# Patient Record
Sex: FEMALE | Race: WHITE | NOT HISPANIC OR LATINO | Employment: UNEMPLOYED | URBAN - NONMETROPOLITAN AREA
[De-identification: names, ages, dates, MRNs, and addresses within clinical notes are randomized per-mention and may not be internally consistent; named-entity substitution may affect disease eponyms.]

---

## 2017-03-30 ENCOUNTER — TRANSCRIBE ORDERS (OUTPATIENT)
Dept: PHYSICAL THERAPY | Facility: HOSPITAL | Age: 72
End: 2017-03-30

## 2017-03-30 DIAGNOSIS — M54.16 BILATERAL LUMBAR RADICULOPATHY: Primary | ICD-10-CM

## 2017-04-13 ENCOUNTER — HOSPITAL ENCOUNTER (OUTPATIENT)
Dept: PHYSICAL THERAPY | Facility: HOSPITAL | Age: 72
Setting detail: THERAPIES SERIES
Discharge: HOME | End: 2017-04-13

## 2017-04-13 DIAGNOSIS — M54.41 CHRONIC MIDLINE LOW BACK PAIN WITH BILATERAL SCIATICA: Primary | ICD-10-CM

## 2017-04-13 DIAGNOSIS — M54.16 LUMBAR RADICULAR PAIN: ICD-10-CM

## 2017-04-13 DIAGNOSIS — M54.42 CHRONIC MIDLINE LOW BACK PAIN WITH BILATERAL SCIATICA: Primary | ICD-10-CM

## 2017-04-13 DIAGNOSIS — G89.29 CHRONIC MIDLINE LOW BACK PAIN WITH BILATERAL SCIATICA: Primary | ICD-10-CM

## 2017-04-13 DIAGNOSIS — M19.90 ARTHRITIS: ICD-10-CM

## 2017-04-13 PROCEDURE — G8982 BODY POS GOAL STATUS: HCPCS | Performed by: PHYSICAL THERAPIST

## 2017-04-13 PROCEDURE — G8981 BODY POS CURRENT STATUS: HCPCS | Performed by: PHYSICAL THERAPIST

## 2017-04-13 PROCEDURE — 97110 THERAPEUTIC EXERCISES: CPT | Performed by: PHYSICAL THERAPIST

## 2017-04-13 PROCEDURE — 97162 PT EVAL MOD COMPLEX 30 MIN: CPT | Performed by: PHYSICAL THERAPIST

## 2017-04-13 NOTE — PROGRESS NOTES
Outpatient Physical Therapy Ortho Initial Evaluation  [facility]  Edna Wells, PT, DPT, CSCS       Patient Name: Sophia Neff  : 1945  MRN: [id number]  Today's Date: 2017      Visit Date: 2017    Pt reports 0/10 pain.  Reports N/A% of improvement.  Attended  visits.  Insurance available: Medical necessity and medicare cap  Next MD appt: 2017.  Recertification: 2017.     Past Medical History:   Diagnosis Date   • Anemia    • Arthritis    • Diabetes mellitus    • Fibromyalgia    • High cholesterol    • Hypertension    • Plantar fasciitis     Bilateral        Past Surgical History:   Procedure Laterality Date   • APPENDECTOMY  1970   • BREAST SURGERY Bilateral 1992    Reduction   • GALLBLADDER SURGERY  2004   • HYSTERECTOMY     • NECK SURGERY  1989   • PELVIC FLOOR REPAIR  2009   • PELVIC FLOOR REPAIR  2009       Visit Dx:     ICD-10-CM ICD-9-CM   1. Chronic midline low back pain with bilateral sciatica M54.41 724.2    M54.42 724.3    G89.29 338.29   2. Lumbar radicular pain M54.16 724.4   3. Arthritis M19.90 716.90     Number of days off work: N/A    Patient is .    Patient has grown children whom live with her.    Medications:   Glucotrol 10mg  Januvia 50mg  Hydralazine 10mg  Cozaar EQ 25mg  Docusate Sodium 100mg  Pravastatin 40mg  Claratin 10mg  Robasin 500mg  Clacium 600mg with Vit D 125 IU  Multi-vitamin- once a day women's  Tylenol arthritis 650mg (no asprain)  Voltaren topical gel 1%  Triamcinolone Acet 454GM-Top 0.1% oint      Allergies: See EMR and also  Iriseofluvin Ultramicrosize  Magcitrate  BC/Goody Powder            Patient History       17 0900          History    Chief Complaint Pain  -AJ      Type of Pain Back pain  -AJ      Date Current Problem(s) Began --   Chronic, 1 month ago increasing onset  -AJ      Brief Description of Current Complaint Previous patient ~ 6 months ago  "for abdominal pain and radiating pain around the trunk. Reports about a month ago pain in LB and legs started increasing.  Reports she's had on/off x-rays for the last 20 years. Reports the pain is come/go and if she moves in a hurry it increases pain. Flexion increases pain.  -AJ      Previous treatment for THIS PROBLEM Rehabilitation  -AJ      Patient/Caregiver Goals Relieve pain  -AJ      Current Tobacco Use None  -AJ      Smoking Status Former Smoker, quite years ago.  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 Yes   Dr. Moore, neurology  -AJ      What clinical tests have you had for this problem? MRI  -AJ      Results of Clinical Tests DDD and other degenerative changes.  -AJ      History of Previous Related Injuries None  -AJ      Are you or can you be pregnant No  -AJ      Pain     Pain Location Back  -AJ      Pain at Present 0   at rest, \"As long as I don't move it's fine.\"  -AJ      Pain at Best 0  -AJ      Pain at Worst 10   reports while laughing and giddy  -AJ      Pain Frequency Intermittent  -AJ      Pain Description Sharp;Dull;Aching;Sore;Cramping  -AJ      What Performance Factors Make the Current Problem(s) WORSE? Flexion and fast movements  -AJ      What Performance Factors Make the Current Problem(s) BETTER? Resting and being still  -AJ      Tolerance Time- Standing Hours/ Unlimited  -AJ      Tolerance Time- Sitting 30 minutes  -AJ      Tolerance Time- Walking Unlimited  -AJ      Tolerance Time- Lying Unlimited  -AJ      Is your sleep disturbed? No  -AJ      Is medication used to assist with sleep? Yes  -AJ      Difficulties at work? N/A  -AJ      Difficulties with ADL's? dressing, showering  -AJ      Difficulties with recreational activities? Computing, website, [employer]... \"anything " "sitting\"  -AJ      Fall Risk Assessment    Any falls in the past year: Yes  -AJ      Number of falls reported in the last 12 months 1  -AJ      Factors that contributed to the fall: Tripped   Going up the steps  -AJ      Does patient have a fear of falling No  -AJ      Services    Prior Rehab/Home Health Experiences Yes  -AJ      When was the prior experience with Rehab/Home Health November 2016, 1 month of Rehab  -AJ      Where was the prior experience with Rehab/Home Health [facility]  -AJ      Are you currently receiving Home Health services No  -AJ      What agency are you receiving Home Health services N/A  -AJ      Do you plan to receive Home Health services in the near future No  -AJ        User Key  (r) = Recorded By, (t) = Taken By, (c) = Cosigned By    Initials Name Provider Type    RIAZ Wells, PT Physical Therapist                PT Ortho       04/13/17 0900    Subjective Comments    Subjective Comments To have no pain and be able to move more and feel better.  -AJ    Subjective Pain    Able to rate subjective pain? yes  -AJ    Pre-Treatment Pain Level 0  -AJ    Post-Treatment Pain Level 0   \"Sore\"  -AJ    Posture/Observations    Alignment Options Forward head;Cervical lordosis;Thoracic kyphosis;Rounded shoulders;Lumbar lordosis  -AJ    Forward Head Mild;Increased  -AJ    Cervical Lordosis Mild;Increased  -AJ    Thoracic Kyphosis Mild;Increased  -AJ    Rounded Shoulders Bilateral:;Mild;Increased  -AJ    Lumbar lordosis Normal  -AJ    Posture/Observations Comments No acute distress.  -AJ    Sensation    Sensation WNL? WNL  -AJ    Light Touch No apparent deficits  -AJ    Sharp/Dull No apparent deficits  -AJ    Additional Comments patient reports hshooting pain in buttocks and down legs on/off, but denies any specific numbness or tingling.  -AJ    DTR- Lower Quarter Clearing    Patellar tendon (L2-4) Bilateral:;2- Normal response  -AJ    Achilles " tendon (S1-2) Bilateral:;2- Normal response  -AJ    Trunk    Flexion AROM --   75°  -AJ    Extension AROM --   28°  -AJ    Left Lateral Flexion AROM --   75% of range  -AJ    Right Lateral Flexion AROM --   75% of range  -AJ    Left Rotation AROM --   75% of range  -AJ    Right Rotation AROM --   75% of range  -AJ    MMT (Manual Muscle Testing)    General MMT Assessment Detail B LE 5/5  -AJ    Trunk    Trunk Flexion Gross Movement (4-/5) good minus  -AJ    Trunk Extension Gross Movement (4-/5) good minus  -AJ    Left Shoulder    Flexion Gross Movement (4+/5) good plus  -AJ    Right Shoulder    Flexion Gross Movement (4+/5) good plus  -AJ    Upper Extremity    Upper Ext Manual Muscle Testing Detail B UE 5/5 except those listed specifically.  -AJ    Lower Extremity Flexibility    Hip Flexors Bilateral:;Moderately limited  -AJ    Transfers    Transfers, Bed-Chair Tullos moderate assist (50% patient effort)   Supine to sit   -AJ    Transfers, Chair-Bed Tullos minimum assist (75% patient effort)   sit to supine  -AJ    Transfers, Sit-Stand Tullos independent  -AJ    Transfers, Stand-Sit Tullos independent  -AJ    Transfer, Safety Issues --   Decreased core strength.  -AJ    Transfer, Impairments pain;strength decreased  -AJ    Transfer, Comment A required sith supine to/from sit only, not sit to/from stand.  -    Gait Assessment/Treatment    Gait, Tullos Level conditional independence  -    Gait, Assistive Device straight cane   no asistive devie within the back treatment area.  -AJ    Gait, Impairments --   None noted  -      User Key  (r) = Recorded By, (t) = Taken By, (c) = Cosigned By    Initials Name Provider Type    RIAZ Wells PT Physical Therapist                Therapy Education       04/13/17 0900          Therapy Education    Given HEP;Symptoms/condition management;Pain management;Posture/body mechanics;Mobility training   Plan of Care; Log Roll Technique.  -RIAZ       Program New  -      How Provided Verbal;Demonstration;Written  -AJ      Provided to Patient  -RIAZ      Level of Understanding Verbalized;Demonstrated  -        User Key  (r) = Recorded By, (t) = Taken By, (c) = Cosigned By    Initials Name Provider Type    RIAZ Wells, PT Physical Therapist                PT OP Goals       04/13/17 0900       PT Short Term Goals    STG Date to Achieve 05/04/17  -RIAZ     STG 1 I with HEp and have additions/changes by next recertification.  -RIAZ     STG 2 AROM lumbar extension 35° or greater.  -AJ     STG 3 AROM B lumbar SB WNL.  -AJ     STG 4 AROM B lumbar ROT WNL.  -     STG 5 Able to perform a proper log roll technique with no A.  -     Long Term Goals    LTG Date to Achieve 05/12/17  -RIAZ     LTG 1 AROM for lumbar spine all WNL no increase in pain.  -     LTG 2 Able to perform 20 briges UE in an X with no increase in pain.  -AJ     LTG 3 Able to show proper lifting technique floor to waist to shoulder level.  -     LTG 4 Able to show proper ergonomic positioning for sitting at a desk/table.  -     LTG 5 Negative kathia B.  -     LTG 6 I with final HEP.  -     LTG 7 D/C with free 30 day fitness formula membership.  -     Time Calculation    PT Goal Re-Cert Due Date 05/04/17  -       User Key  (r) = Recorded By, (t) = Taken By, (c) = Cosigned By    Initials Name Provider Type    RIAZ Wells PT Physical Therapist         Barriers to Rehab: Significant arthritic/degenerative changes that have occurred within the spine, The patient's obesity, The patient's generally deconditioned state.    Safety Issues: None noted.            PT Assessment/Plan       04/13/17 0900       PT Assessment    Functional Limitations Limitation in home management;Limitations in community activities;Performance in leisure activities;Performance in self-care ADL  -     Impairments Balance;Endurance;Impaired flexibility;Impaired muscle endurance;Impaired muscle  length;Impaired muscle power;Joint mobility;Motor function;Muscle strength;Pain;Posture;Range of motion  -     Assessment Comments Patient moved very slow and guarded but did well with all exercises.  -AJ     Rehab Potential Fair  -AJ     Patient/caregiver participated in establishment of treatment plan and goals Yes  -AJ     Patient would benefit from skilled therapy intervention Yes  -AJ     PT Plan    PT Frequency 2x/week  -AJ     Predicted Duration of Therapy Intervention (days/wks) 3-4 weeks, 6-8 visits  -AJ     Planned CPT's? PT EVAL MOD COMPLELITY: 79437;PT RE-EVAL: 39852;PT THER PROC EA 15 MIN: 66932;PT THER ACT EA 15 MIN: 55297;PT MANUAL THERAPY EA 15 MIN: 69134;PT ELECTRICAL STIM UNATTEND: ;PT THER SUPP EA 15 MIN  -AJ     Physical Therapy Interventions (Optional Details) balance training;bed mobility training;gait training;gross motor skills;home exercise program;joint mobilization;lumbar stabilization;manual therapy techniques;modalities;patient/family education;postural re-education;ROM (Range of Motion);stair training;strengthening;stretching;swiss ball techniques;transfer training  -     PT Plan Comments Add DENISE next session if able.  -AJ       User Key  (r) = Recorded By, (t) = Taken By, (c) = Cosigned By    Initials Name Provider Type    RIAZ eWlls PT Physical Therapist       Other therapeutic activities and/or exercises will be prescribed depending on the patients progress or lack there of.          Modalities       04/13/17 0900          Ice    Patient denies application of Ice Yes  -AJ      Patient reports will apply ice at home to involved area Yes  -AJ        User Key  (r) = Recorded By, (t) = Taken By, (c) = Cosigned By    Initials Name Provider Type    RIAZ Wells PT Physical Therapist              Exercises       04/13/17 0900          Subjective Comments    Subjective Comments To have no pain and be able to move more and feel better.  -AJ      Subjective  "Pain    Able to rate subjective pain? yes  -AJ      Pre-Treatment Pain Level 0  -AJ      Post-Treatment Pain Level 0   \"Sore\"  -AJ      Exercise 1    Exercise Name 1 B sit piri S  -AJ      Reps 1 2  -AJ      Time (Seconds) 1 30 seconds  -AJ      Exercise 2    Exercise Name 2 B St. HS S  -AJ      Reps 2 2  -AJ      Time (Seconds) 2 30 seconds  -AJ      Exercise 3    Exercise Name 3 LTR- semireclined  -AJ      Exercise 4    Exercise Name 4 St. Lumbar extension  -      Reps 4 10  -AJ      Time (Seconds) 4 5\" hold  -AJ        User Key  (r) = Recorded By, (t) = Taken By, (c) = Cosigned By    Initials Name Provider Type     Edna Wells PT Physical Therapist                              Outcome Measures       04/13/17 0900          Modified Oswestry    Modified Oswestry Score/Comments 22%  -      Functional Assessment    Outcome Measure Options Modifed Owestry  -        User Key  (r) = Recorded By, (t) = Taken By, (c) = Cosigned By    Initials Name Provider Type     Edna Wells PT Physical Therapist            Time Calculation:   Start Time: 0900  Stop Time: 0958  Time Calculation (min): 58 min  Total Timed Code Minutes- PT: 18 minute(s)     Therapy Charges for Today     Code Description Service Date Service Provider Modifiers Qty    33018361100 HC PT CHNG MAIN POS CURRENT 4/13/2017 Edna Wells, PT GP, CJ 1    88805681806 HC PT CHNG MAIN POS PROJECTED 4/13/2017 Edna Wells, PT GP, CI 1    12640952491 HC PT EVAL MOD COMPLEXITY 3 4/13/2017 Edna Wells PT GP 1    02503666437 HC PT THER PROC EA 15 MIN 4/13/2017 Edna Wells PT GP 1    43616726205 HC PT THER SUPP EA 15 MIN 4/13/2017 Edna Wells, PT GP 2          PT G-Codes  Outcome Measure Options: Modifed Owestry  Functional Limitation: Changing and maintaining body position  Changing and Maintaining Body Position Current Status (): At least 20 percent but less than 40 percent impaired, limited or " restricted  Changing and Maintaining Body Position Goal Status (): At least 1 percent but less than 20 percent impaired, limited or restricted         Edna Wells, PT, DPT, CSCS  4/13/2017

## 2017-04-17 ENCOUNTER — APPOINTMENT (OUTPATIENT)
Dept: PHYSICAL THERAPY | Facility: HOSPITAL | Age: 72
End: 2017-04-17

## 2017-04-18 ENCOUNTER — HOSPITAL ENCOUNTER (OUTPATIENT)
Dept: PHYSICAL THERAPY | Facility: HOSPITAL | Age: 72
Setting detail: THERAPIES SERIES
Discharge: HOME | End: 2017-04-18

## 2017-04-18 DIAGNOSIS — M54.16 LUMBAR RADICULAR PAIN: ICD-10-CM

## 2017-04-18 DIAGNOSIS — M19.90 ARTHRITIS: ICD-10-CM

## 2017-04-18 DIAGNOSIS — G89.29 CHRONIC MIDLINE LOW BACK PAIN WITH BILATERAL SCIATICA: Primary | ICD-10-CM

## 2017-04-18 DIAGNOSIS — M54.41 CHRONIC MIDLINE LOW BACK PAIN WITH BILATERAL SCIATICA: Primary | ICD-10-CM

## 2017-04-18 DIAGNOSIS — M54.42 CHRONIC MIDLINE LOW BACK PAIN WITH BILATERAL SCIATICA: Primary | ICD-10-CM

## 2017-04-18 PROCEDURE — 97535 SELF CARE MNGMENT TRAINING: CPT

## 2017-04-18 PROCEDURE — 97110 THERAPEUTIC EXERCISES: CPT

## 2017-04-18 NOTE — PROGRESS NOTES
"    Outpatient Physical Therapy Ortho Treatment Note  [facility]  Paula Ahumada PTA       Patient Name: Sophia Neff  : 1945  MRN: [id number]  Today's Date: 2017      Visit Date: 2017     Visits: 2/2  Insurance Visits Approved: based on medical necessity and medicare cap  Recert Due: 2017  MD Appt: TBD  Pain: pretreatment 4/10; post treatment \"sore\"/10  Improvement: pt is subjectively reporting 0% improvement since initial evaluation    Visit Dx:    ICD-10-CM ICD-9-CM   1. Chronic midline low back pain with bilateral sciatica M54.41 724.2    M54.42 724.3    G89.29 338.29   2. Lumbar radicular pain M54.16 724.4   3. Arthritis M19.90 716.90       There is no problem list on file for this patient.       Past Medical History:   Diagnosis Date   • Anemia    • Arthritis    • Diabetes mellitus    • Fibromyalgia    • High cholesterol    • Hypertension    • Plantar fasciitis     Bilateral        Past Surgical History:   Procedure Laterality Date   • APPENDECTOMY  1970   • BREAST SURGERY Bilateral 1992    Reduction   • GALLBLADDER SURGERY  2004   • HYSTERECTOMY     • NECK SURGERY  1989   • PELVIC FLOOR REPAIR  2009   • PELVIC FLOOR REPAIR  2009             PT Ortho       17 1300    Subjective Comments    Subjective Comments states that she has brought her MRI reports and would like the PT to see them before we start treatment today.    post tx states will NEVER do Prone again  -    Subjective Pain    Able to rate subjective pain? yes  -    Pre-Treatment Pain Level 4  -MH    Post-Treatment Pain Level --   sore  -    Posture/Observations    Posture/Observations Comments no acute distress noted, patient appears giddy throughout.   -      User Key  (r) = Recorded By, (t) = Taken By, (c) = Cosigned By    Initials Name Provider Type     Paual Ahumada PTA Physical Therapy Assistant                            PT " Assessment/Plan       04/18/17 1300       PT Assessment    Assessment Comments extensive amount of time spent on patient education post treatment discussing POC, treatment plan and reasoning behind treatments chosen. education provided on anatomy of spine and discs and nerve involvement along the spine. patient has significant complaints with prone therex today and with transfers but is able to transfer from supine to sidelying and then to sitting with no assist.   -     PT Plan    PT Frequency 2x/week  -     PT Plan Comments next visit continue with log roll to sidelying to sitting for therex  -       User Key  (r) = Recorded By, (t) = Taken By, (c) = Cosigned By    Initials Name Provider Type     Paula Ahumada PTA Physical Therapy Assistant                Modalities       04/18/17 1300          Ice    Patient denies application of Ice Yes  -      Patient reports will apply ice at home to involved area Yes  -        User Key  (r) = Recorded By, (t) = Taken By, (c) = Cosigned By    Initials Name Provider Type     Paula Ahumada PTA Physical Therapy Assistant                Exercises       04/18/17 1300          Subjective Comments    Subjective Comments states that she has brought her MRI reports and would like the PT to see them before we start treatment today.    post tx states will NEVER do Prone again  -      Subjective Pain    Able to rate subjective pain? yes  -      Pre-Treatment Pain Level 4  -MH      Post-Treatment Pain Level --   sore  -      Exercise 1    Exercise Name 1 Pro II LE seat 7  -      Time (Minutes) 1 10 minutes  -      Exercise 2    Exercise Name 2 B St. HS S  -MH      Reps 2 2  -MH      Time (Seconds) 2 30 seconds  -      Exercise 3    Exercise Name 3 B Sit Piri S  -      Reps 3 2  -MH      Time (Seconds) 3 30 seconds  -      Exercise 4    Exercise Name 4 Sit to Stand with Lx Ext  -      Reps 4 20  -MH      Time (Seconds) 4 5 sec hold  -      Exercise 5     Exercise Name 5 LTR  -MH      Reps 5 10  -MH      Time (Seconds) 5 10 sec hold  -MH      Exercise 6    Exercise Name 6 Bridges  -MH      Reps 6 20  -MH      Time (Seconds) 6 5 sec hold  -MH      Exercise 7    Exercise Name 7 BKLL  -MH      Reps 7 20  -MH      Time (Seconds) 7 5 sec hold  -MH        User Key  (r) = Recorded By, (t) = Taken By, (c) = Cosigned By    Initials Name Provider Type     Paula Ahumada PTA Physical Therapy Assistant                               PT OP Goals       04/18/17 1300       PT Short Term Goals    STG Date to Achieve 05/04/17  -     STG 1 I with HEp and have additions/changes by next recertification.  -     STG 1 Progress Progressing  -     STG 2 AROM lumbar extension 35° or greater.  -     STG 2 Progress Progressing  -     STG 3 AROM B lumbar SB WNL.  -     STG 3 Progress Progressing  -     STG 4 AROM B lumbar ROT WNL.  -     STG 4 Progress Progressing  -     STG 5 Able to perform a proper log roll technique with no A.  -     STG 5 Progress Progressing  -     Long Term Goals    LTG Date to Achieve 05/12/17  -     LTG 1 AROM for lumbar spine all WNL no increase in pain.  -     LTG 1 Progress Progressing  -     LTG 2 Able to perform 20 briges UE in an X with no increase in pain.  -     LTG 2 Progress Progressing  -     LTG 3 Able to show proper lifting technique floor to waist to shoulder level.  -     LTG 3 Progress Progressing  -     LTG 4 Able to show proper ergonomic positioning for sitting at a desk/table.  -     LTG 4 Progress Progressing  -     LTG 5 Negative kathia B.  -     LTG 5 Progress Progressing  -     LTG 6 I with final HEP.  -     LTG 6 Progress Progressing  -     LTG 7 D/C with free 30 day fitness formula membership.  -     Time Calculation    PT Goal Re-Cert Due Date 05/04/17  -       User Key  (r) = Recorded By, (t) = Taken By, (c) = Cosigned By    Initials Name Provider Type     Paula Ahumada PTA Physical  Therapy Assistant                Therapy Education       04/18/17 1300          Therapy Education    Given HEP;Symptoms/condition management;Pain management;Posture/body mechanics;Mobility training   POC,anatomy,education behind treatment methods  -      Program New   bridges, BKLL  -      How Provided Verbal;Demonstration;Written  -MH      Provided to Patient  -MH      Level of Understanding Verbalized;Demonstrated  -MH        User Key  (r) = Recorded By, (t) = Taken By, (c) = Cosigned By    Initials Name Provider Type     Paula Ahumada PTA Physical Therapy Assistant                Time Calculation:   Start Time: 1300  Stop Time: 1426  Time Calculation (min): 86 min  Total Timed Code Minutes- PT: 86 minute(s)    Therapy Charges for Today     Code Description Service Date Service Provider Modifiers Qty    58385333758 HC PT THER PROC EA 15 MIN 4/18/2017 Paula Ahumada, PTA GP 4    97577717302 HC PT THER SUPP EA 15 MIN 4/18/2017 Paula Ahumada, PTA GP 1    10974922703 HC PT THER PROC EA 15 MIN 4/18/2017 Paula Ahumada, PTA GP 1    09587982589 HC PT SELF CARE/MGMT/TRAIN EA 15 MIN 4/18/2017 Paula Ahumada, PTA GP 1                    Paula Ahumada PTA  4/18/2017

## 2017-04-20 ENCOUNTER — HOSPITAL ENCOUNTER (OUTPATIENT)
Dept: PHYSICAL THERAPY | Facility: HOSPITAL | Age: 72
Setting detail: THERAPIES SERIES
Discharge: HOME | End: 2017-04-20

## 2017-04-20 ENCOUNTER — TELEPHONE (OUTPATIENT)
Dept: PHYSICAL THERAPY | Facility: HOSPITAL | Age: 72
End: 2017-04-20

## 2017-04-20 DIAGNOSIS — M54.41 CHRONIC MIDLINE LOW BACK PAIN WITH BILATERAL SCIATICA: Primary | ICD-10-CM

## 2017-04-20 DIAGNOSIS — M19.90 ARTHRITIS: ICD-10-CM

## 2017-04-20 DIAGNOSIS — G89.29 CHRONIC MIDLINE LOW BACK PAIN WITH BILATERAL SCIATICA: Primary | ICD-10-CM

## 2017-04-20 DIAGNOSIS — M54.42 CHRONIC MIDLINE LOW BACK PAIN WITH BILATERAL SCIATICA: Primary | ICD-10-CM

## 2017-04-20 DIAGNOSIS — M54.16 LUMBAR RADICULAR PAIN: ICD-10-CM

## 2017-04-20 PROCEDURE — 97110 THERAPEUTIC EXERCISES: CPT

## 2017-04-20 NOTE — PROGRESS NOTES
"    Outpatient Physical Therapy Ortho Treatment Note  [facility]  Paula Ahumada, MANUEL       Patient Name: Sophia Neff  : 1945  MRN: [id number]  Today's Date: 2017      Visit Date: 2017     Visits: 3/3  Insurance Visits Approved: based on medical necessity  Recert Due: 2017  MD Appt: TBD  Pain: pretreatment 0/10; post treatment 0/10 for back pain; does state pretreatment that abdominal pain is a 9-10/10 but is giddy throughout treatment and shows no signs of distress.   Improvement: pt is subjectively reporting *% improvement since initial evaluation    Visit Dx:    ICD-10-CM ICD-9-CM   1. Chronic midline low back pain with bilateral sciatica M54.41 724.2    M54.42 724.3    G89.29 338.29   2. Lumbar radicular pain M54.16 724.4   3. Arthritis M19.90 716.90       There is no problem list on file for this patient.       Past Medical History:   Diagnosis Date   • Anemia    • Arthritis    • Diabetes mellitus    • Fibromyalgia    • High cholesterol    • Hypertension    • Plantar fasciitis     Bilateral        Past Surgical History:   Procedure Laterality Date   • APPENDECTOMY  1970   • BREAST SURGERY Bilateral 1992    Reduction   • GALLBLADDER SURGERY  2004   • HYSTERECTOMY     • NECK SURGERY  1989   • PELVIC FLOOR REPAIR  2009   • PELVIC FLOOR REPAIR  2009             PT Ortho       17 0800    Subjective Comments    Subjective Comments denies back pain. but states that her abdomen is painful.   -    Subjective Pain    Able to rate subjective pain? yes  -    Pre-Treatment Pain Level 0   reports no back pain,states that abdomen is a \"9-10/10\"  -    Post-Treatment Pain Level 0  -    Posture/Observations    Posture/Observations Comments no acute distress noted. giddy.   -      17 1300    Subjective Comments    Subjective Comments states that she has brought her MRI reports and would like the PT to see them " "before we start treatment today.    post tx states will NEVER do Prone again  -    Subjective Pain    Able to rate subjective pain? yes  -    Pre-Treatment Pain Level 4  -    Post-Treatment Pain Level --   sore  -    Posture/Observations    Posture/Observations Comments no acute distress noted, patient appears giddy throughout.   -      User Key  (r) = Recorded By, (t) = Taken By, (c) = Cosigned By    Initials Name Provider Type     Paula Ahumada PTA Physical Therapy Assistant                            PT Assessment/Plan       04/20/17 0800       PT Assessment    Assessment Comments patient completes supine therex without incident. did not attempt prone due to patients complaints last visit.   -     PT Plan    PT Frequency 2x/week  -     PT Plan Comments next visit DKTC ball rolls  -       User Key  (r) = Recorded By, (t) = Taken By, (c) = Cosigned By    Initials Name Provider Type     Paula Ahumada PTA Physical Therapy Assistant                Modalities       04/20/17 0800          Ice    Patient denies application of Ice Yes  -      Patient reports will apply ice at home to involved area Yes  -        User Key  (r) = Recorded By, (t) = Taken By, (c) = Cosigned By    Initials Name Provider Type     Paula Ahumada PTA Physical Therapy Assistant                Exercises       04/20/17 0800          Subjective Comments    Subjective Comments denies back pain. but states that her abdomen is painful.   -      Subjective Pain    Able to rate subjective pain? yes  -      Pre-Treatment Pain Level 0   reports no back pain,states that abdomen is a \"9-10/10\"  -      Post-Treatment Pain Level 0  -      Exercise 1    Exercise Name 1 Pro II LE seat 7  -      Resistance 1 --   L 5.0  -      Time (Minutes) 1 10 minutes  -      Exercise 2    Exercise Name 2 B St. HS S  -      Cueing 2 Verbal  -      Reps 2 2  -      Time (Seconds) 2 30 seconds  -      Exercise 3    Exercise Name 3 " B Sit Piri S  -MH      Cueing 3 Verbal;Tactile  -MH      Reps 3 2  -MH      Time (Seconds) 3 30 seconds  -MH      Exercise 4    Exercise Name 4 Sit to Stand with Lx Ext  -MH      Reps 4 20  -MH      Time (Seconds) 4 5 sec hold  -MH      Exercise 5    Exercise Name 5 LTR  -MH      Cueing 5 Verbal  -MH      Reps 5 10  -MH      Time (Seconds) 5 10 sec hold  -MH      Exercise 6    Exercise Name 6 Bridges  -MH      Cueing 6 Verbal  -MH      Reps 6 20  -MH      Time (Seconds) 6 5 sec hold  -MH      Exercise 7    Exercise Name 7 BKLL  -MH      Cueing 7 Verbal  -MH      Reps 7 20  -MH      Time (Seconds) 7 5 sec hold  -MH      Exercise 8    Exercise Name 8 Log Roll for transfer  -MH      Reps 8 1  -MH        User Key  (r) = Recorded By, (t) = Taken By, (c) = Cosigned By    Initials Name Provider Type    MARISSA Ahumada, PTA Physical Therapy Assistant                               PT OP Goals       04/20/17 0800       PT Short Term Goals    STG Date to Achieve 05/04/17  -     STG 1 I with HEp and have additions/changes by next recertification.  -     STG 1 Progress Progressing  -     STG 2 AROM lumbar extension 35° or greater.  -     STG 2 Progress Progressing  -     STG 3 AROM B lumbar SB WNL.  -     STG 3 Progress Progressing  -     STG 4 AROM B lumbar ROT WNL.  -     STG 4 Progress Progressing  -     STG 5 Able to perform a proper log roll technique with no A.  -     STG 5 Progress Met  -     Long Term Goals    LTG Date to Achieve 05/12/17  -     LTG 1 AROM for lumbar spine all WNL no increase in pain.  -     LTG 1 Progress Progressing  -     LTG 2 Able to perform 20 briges UE in an X with no increase in pain.  -     LTG 2 Progress Progressing  -     LTG 3 Able to show proper lifting technique floor to waist to shoulder level.  -     LTG 3 Progress Progressing  -     LTG 4 Able to show proper ergonomic positioning for sitting at a desk/table.  -     LTG 4 Progress Progressing  -      LTG 5 Negative kathia B.  -MH     LTG 5 Progress Progressing  -MH     LTG 6 I with final HEP.  -MH     LTG 6 Progress Progressing  -MH     LTG 7 D/C with free 30 day fitness formula membership.  -MH     Time Calculation    PT Goal Re-Cert Due Date 05/04/17  -MH       User Key  (r) = Recorded By, (t) = Taken By, (c) = Cosigned By    Initials Name Provider Type     Paula Ahumada PTA Physical Therapy Assistant                Therapy Education       04/20/17 0800          Therapy Education    Given HEP;Symptoms/condition management;Pain management;Posture/body mechanics;Mobility training  -MH      Program Reinforced  -      How Provided Verbal  -MH      Provided to Patient  -MH      Level of Understanding Verbalized  -MH        User Key  (r) = Recorded By, (t) = Taken By, (c) = Cosigned By    Initials Name Provider Type     Paula Ahumada PTA Physical Therapy Assistant                Time Calculation:   Start Time: 0800  Stop Time: 0853  Time Calculation (min): 53 min  Total Timed Code Minutes- PT: 53 minute(s)    Therapy Charges for Today     Code Description Service Date Service Provider Modifiers Qty    31964742636  PT THER PROC EA 15 MIN 4/20/2017 Paula Ahumada PTA GP 4    69585502106 HC PT THER SUPP EA 15 MIN 4/20/2017 Paula Ahumada PTA GP 1                    Paula Ahumada PTA  4/20/2017

## 2017-04-20 NOTE — TELEPHONE ENCOUNTER
Patient called stating she saw Dr. Moore and he wants to refer her to Dr. Kulkarni, an abdominal surgeon for assessment of her abdominal pain that she's been having. She reports she sees him on Monday and wants to cancel all visits until she sees him. She was explained we can place her chart on hold for 60 days from her evaluation but to call us and keep us advised of what he tells her. Patient verbalized understanding of this.        Edna Wells, PT, DPT, CSCS

## 2017-04-24 ENCOUNTER — APPOINTMENT (OUTPATIENT)
Dept: PHYSICAL THERAPY | Facility: HOSPITAL | Age: 72
End: 2017-04-24

## 2017-04-26 ENCOUNTER — APPOINTMENT (OUTPATIENT)
Dept: PHYSICAL THERAPY | Facility: HOSPITAL | Age: 72
End: 2017-04-26

## 2017-06-21 ENCOUNTER — DOCUMENTATION (OUTPATIENT)
Dept: PHYSICAL THERAPY | Facility: HOSPITAL | Age: 72
End: 2017-06-21

## 2017-06-21 DIAGNOSIS — M54.41 CHRONIC MIDLINE LOW BACK PAIN WITH BILATERAL SCIATICA: Primary | ICD-10-CM

## 2017-06-21 DIAGNOSIS — M54.16 LUMBAR RADICULAR PAIN: ICD-10-CM

## 2017-06-21 DIAGNOSIS — G89.29 CHRONIC MIDLINE LOW BACK PAIN WITH BILATERAL SCIATICA: Primary | ICD-10-CM

## 2017-06-21 DIAGNOSIS — M19.90 ARTHRITIS: ICD-10-CM

## 2017-06-21 DIAGNOSIS — M54.42 CHRONIC MIDLINE LOW BACK PAIN WITH BILATERAL SCIATICA: Primary | ICD-10-CM

## 2018-01-24 ENCOUNTER — TRANSCRIBE ORDERS (OUTPATIENT)
Dept: PHYSICAL THERAPY | Facility: HOSPITAL | Age: 73
End: 2018-01-24

## 2018-01-24 DIAGNOSIS — R10.13 EPIGASTRIC PAIN: Primary | ICD-10-CM

## 2018-01-26 ENCOUNTER — HOSPITAL ENCOUNTER (OUTPATIENT)
Dept: PHYSICAL THERAPY | Facility: HOSPITAL | Age: 73
Setting detail: THERAPIES SERIES
Discharge: HOME | End: 2018-01-26

## 2018-01-26 DIAGNOSIS — M79.18 MUSCULOSKELETAL PAIN, CHRONIC: ICD-10-CM

## 2018-01-26 DIAGNOSIS — G89.29 MUSCULOSKELETAL PAIN, CHRONIC: ICD-10-CM

## 2018-01-26 DIAGNOSIS — R10.9 STOMACH PAIN: Primary | ICD-10-CM

## 2018-01-26 PROCEDURE — G8982 BODY POS GOAL STATUS: HCPCS | Performed by: PHYSICAL THERAPIST

## 2018-01-26 PROCEDURE — G8981 BODY POS CURRENT STATUS: HCPCS | Performed by: PHYSICAL THERAPIST

## 2018-01-26 PROCEDURE — 97110 THERAPEUTIC EXERCISES: CPT | Performed by: PHYSICAL THERAPIST

## 2018-01-26 PROCEDURE — 97162 PT EVAL MOD COMPLEX 30 MIN: CPT | Performed by: PHYSICAL THERAPIST

## 2018-01-26 NOTE — THERAPY EVALUATION
Outpatient Physical Therapy Ortho Initial Evaluation  [facility]  Edna Wells, PT, DPT, CSCS       Patient Name: Sophia Neff  : 1945  MRN: [id number]  Today's Date: 2018      Visit Date: 2018    Pt reports 3/10 pain pre treatment, 0/10 pain post treatment  Reports N/A% of improvement.  Attended  visits.  Insurance available: Medical necessity and medicare cap  Next MD appt: TBD .  Recertification: 2018.     Past Medical History:   Diagnosis Date   • Anemia    • Arthritis    • Diabetes mellitus    • Fibromyalgia    • High cholesterol    • Hypertension    • Plantar fasciitis     Bilateral        Past Surgical History:   Procedure Laterality Date   • APPENDECTOMY  1970   • BREAST SURGERY Bilateral 1992    Reduction   • GALLBLADDER SURGERY  2004   • HYSTERECTOMY     • NECK SURGERY  1989   • PELVIC FLOOR REPAIR  2009   • PELVIC FLOOR REPAIR  2009       Visit Dx:     ICD-10-CM ICD-9-CM   1. Stomach pain R10.9 536.8   2. Musculoskeletal pain, chronic M79.1 729.1    G89.29 338.29     Number of days off work: N/A    Patient is .    Patient has grown children.    Medications:   Glucotrol 10mg  Januvia 50mg  Hydralazine 10mg  Cozaar EQ 25mg  Docusate Sodium 100mg  Pravastatin 40mg  Claratin 10mg  Robasin 500mg  Clacium 600mg with Vit D 125 IU  Multi-vitamin- once a day women's  Tylenol arthritis 650mg (no asprain)  Voltaren topical gel 1%  Triamcinolone Acet 454GM-Top 0.1% oint        Allergies:      Aciphex [Rabeprazole Sodium]   Adalat [Nifedipine]   Amoxicillin   Bextra [Valdecoxib]   Celebrex [Celecoxib]   Codeine   Cortisone   Excedrin Back & [Acetaminophen-aspirin Buffered]   Feldene [Piroxicam]   Lodine [Etodolac]   Maalox [Calcium Carbonate Antacid]   Maxidex [Dexamethasone]   Milk Of Magnesia [Magnesium Hydroxide]   Mobic [Meloxicam]   Morphine And Related   Motrin [Ibuprofen]   Naproxen    Periactin [Cyproheptadine]   Prevacid [Lansoprazole]   Prilosec [Omeprazole]   Toradol [Ketorolac Tromethamine]   Valium [Diazepam]   Vioxx [Rofecoxib]   Voltaren [Diclofenac Sodium]     Panfilo as Reviewed Reviewed by You at 11:03 AM.   Iriseofluvin Ultramicrosize  Magcitrate  BC/Goody Powder          Patient History       01/26/18 1100          History    Chief Complaint Pain  -AJ      Type of Pain --   Stomach  -AJ      Date Current Problem(s) Began --   CHronic  -AJ      Brief Description of Current Complaint Patient is a previous return to care. We saw her previous for LBP and radiating into the abdominal pain. SHe reprots she was worked over for her back and only found ro have mild arthritis in LB.  She reports she has continued to have the abdominla pain and sharp pains in the abdomen with trouble getting up. SHe preorts they have ruled out everything and have decided it is her FM attaacking her abdomen. Was referred to PT by a surgeon who ruled out any internal cause.  -AJ      Previous treatment for THIS PROBLEM Rehabilitation;Medication  -AJ      Patient/Caregiver Goals Relieve pain  -AJ      Current Tobacco Use None  -AJ      Smoking Status Former Smoker, quite years ago.  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 Yes  -AJ      What clinical tests have you had for this problem? CT scan   Endoscopy x2  -AJ      Results of Clinical Tests All negative  -AJ      History of Previous Related Injuries None  -AJ      Are you or can you be pregnant No  -AJ      Pain     Pain Location Abdomen  -AJ      Pain at Present 3  -AJ      Pain at Best 1  -AJ      Pain at Worst 8  -AJ      Pain Frequency Constant/continuous  -AJ      Pain Description Sore;Sharp;Shooting  -AJ      What Performance Factors Make the  Current Problem(s) WORSE? fast motions, being over and then straightening up. Transitions/changing positions.  -AJ      What Performance Factors Make the Current Problem(s) BETTER? Not found anything yet, still working on it.  -AJ      Is your sleep disturbed? Yes  -AJ      Is medication used to assist with sleep? Yes  -AJ      What position do you sleep in? --   Semi-reclined, head elevated and feet elevated.  -AJ      Difficulties at work? None  -AJ      Difficulties with ADL's? putting on shoes and socks, bending forward to wash  -AJ      Difficulties with recreational activities? everything limited some due to transitions.  -AJ      Fall Risk Assessment    Any falls in the past year: No  -AJ      Number of falls reported in the last 12 months 0  -AJ      Does patient have a fear of falling No  -AJ        User Key  (r) = Recorded By, (t) = Taken By, (c) = Cosigned By    Initials Name Provider Type    RIAZ Wells, PT Physical Therapist                PT Ortho       01/26/18 1100    Subjective Comments    Subjective Comments Patient wishes to get rid of the pain and be able to perform transitions better.  -AJ    Subjective Pain    Able to rate subjective pain? yes  -AJ    Pre-Treatment Pain Level 3  -AJ    Post-Treatment Pain Level 0  -AJ    Posture/Observations    Alignment Options Forward head;Cervical lordosis;Thoracic kyphosis;Rounded shoulders;Lumbar lordosis  -AJ    Forward Head Mild;Moderate;Increased  -AJ    Cervical Lordosis Mild;Moderate;Increased  -AJ    Thoracic Kyphosis Mild;Increased  -AJ    Rounded Shoulders Bilateral:;Mild;Increased  -AJ    Lumbar lordosis Normal  -AJ    Posture/Observations Comments No acute distress, slow guarded motions  -AJ    Quarter Clearing    Quarter Clearing --   Sensation is intact to crude touch.  -AJ    Lumbar/SI Special Tests    Standing Flexion Test (SI Dysfunction) Bilateral:;Negative  -AJ    Stork Test (SI Dysfunction) Bilateral:;Negative  -AJ     Trendelenburg Test (Gluteus Medius Weakness) Bilateral:;Negative  -AJ    Seated Flexion Test (SI Dysfunction) Bilateral:;Negative  -AJ    Slump Test (Neural Tension) Bilateral:;Negative  -AJ    SLR (Neural Tension) Bilateral:;Negative  -AJ    SI Compression Test (SI Dysfunction) Bilateral:;Negative  -AJ    SI Distraction Test (SI Dysfunction) Bilateral:;Negative  -AJ    KRUNAL (hip vs. SI Dysfunction) Bilateral:;Negative  -AJ    FAIR Test (Piriformis Syndrome) Bilateral:;Negative  -AJ    Juan José's Signs    Superficial and non-anatomical tenderness Positive  -AJ    Simulation test Negative  -AJ    Distraction straight leg raise test (sitting vs supine) Positive  -AJ    Regional disturbances Negative  -AJ    Overreaction to examination Positive  -AJ    Trunk    Flexion AROM --   100° to toes, gabriel's sign with return to neutral  -AJ    Extension AROM --   25°  -AJ    Left Lateral Flexion AROM --   75% of range  -AJ    Right Lateral Flexion AROM --   75% of range  -AJ    Left Rotation AROM --   50% of range  -AJ    Right Rotation AROM WNL (0-45 degrees)  -    MMT (Manual Muscle Testing)    General MMT Assessment Detail B LE 5/5, L hip flexion 4/5 with increase in abdominal pain.  -    Flexibility    Flexibility Tested? --   Mild abdominla tightness  -    Lower Extremity Flexibility    Hip Flexors Bilateral:;Mildly limited  -    Pathomechanics    Spine Pathomechanics Excessive thoracic kyphosis with forward bend  -    Transfers    Transfer, Comment I with all transfers, but moves very slowly and guarded.  -    Gait Assessment/Treatment    Gait, Vance Level independent;conditional independence  -    Gait, Assistive Device quad cane   small base  -    Gait, Comment Comes in carrying cane.  -      User Key  (r) = Recorded By, (t) = Taken By, (c) = Cosigned By    Initials Name Provider Type    AJ Edna Wells PT Physical Therapist        Therapy Education  Given: HEP, Symptoms/condition  management, Pain management, Posture/body mechanics, Mobility training (POC)  Program: New  How Provided: Verbal, Demonstration, Written  Provided to: Patient  Level of Understanding: Verbalized, Demonstrated           PT OP Goals       01/26/18 1100       PT Short Term Goals    STG Date to Achieve 02/16/18  -     STG 1 I with HEP and have additions/changes by next recertification.  -     STG 2 AROM B lumbar SB WNL.  -     STG 3 AROM B thoracic ROT WNL B.  -     STG 4 L hip flexion 4+/5.  -     STG 5 Able to tolerate 20 thoracic ROT to full range aquatically with no increase in pain.  -     Long Term Goals    LTG Date to Achieve 03/09/18  -     LTG 1 AROM for lumbar spine and trunk all WNL, no increase in pain.  -     LTG 2 B LE 5/5, no increase in abdominal pain.  -     LTG 3 Able to perform transfers sitting to supine with no increase in pain.  -     LTG 4 Sit to stand x5, UE A as needed in <= 35 seconds.  -     LTG 5 Sitting forward flexion and return to siting x5 <= 15 seconds.  -     LTG 6 I with all aquatics.  -     LTG 7 I with land final HEP.  -     LTG 8 D/C with free 30 day fitness formula membership.  -     Time Calculation    PT Goal Re-Cert Due Date 02/16/18  -       User Key  (r) = Recorded By, (t) = Taken By, (c) = Cosigned By    Initials Name Provider Type    RIAZ Wells, PT Physical Therapist         Barriers to Rehab: Include significant or possible arthritic/degenerative changes that have occurred within the joints/spine, The patient's obesity, The patient's generally deconditioned state.    Safety Issues: None noted.          PT Assessment/Plan       01/26/18 1100       PT Assessment    Functional Limitations Limitation in home management;Limitations in community activities;Performance in leisure activities;Performance in self-care ADL  -     Impairments Balance;Endurance;Impaired flexibility;Impaired muscle endurance;Impaired muscle length;Impaired  muscle power;Joint mobility;Motor function;Muscle strength;Pain;Posture;Range of motion  -     Assessment Comments Patient performed all ther ex slow and guarded but able to complete.  -AJ     Rehab Potential Fair  -AJ     Patient/caregiver participated in establishment of treatment plan and goals Yes  -AJ     Patient would benefit from skilled therapy intervention Yes  -AJ     PT Plan    PT Frequency 2x/week   1L/1P  -AJ     Predicted Duration of Therapy Intervention (days/wks) 3-4 weeks, 6-8 visits  -AJ     Planned CPT's? PT EVAL MOD COMPLELITY: 81659;PT RE-EVAL: 41964;PT THER PROC EA 15 MIN: 38347;PT THER ACT EA 15 MIN: 96606;PT MANUAL THERAPY EA 15 MIN: 35903;PT GAIT TRAINING EA 15 MIN: 84709;PT THER SUPP EA 15 MIN  -AJ     Physical Therapy Interventions (Optional Details) aquatics exercise;gross motor skills;home exercise program;lumbar stabilization;modalities;manual therapy techniques;patient/family education;postural re-education;ROM (Range of Motion);strengthening;stretching   Core Stab  -AJ     PT Plan Comments Progress overall core stab  -AJ       User Key  (r) = Recorded By, (t) = Taken By, (c) = Cosigned By    Initials Name Provider Type    RIAZ Wells, DEVIN Physical Therapist       Other therapeutic activities and/or exercises will be prescribed depending on the patients progress or lack there of.          Modalities       01/26/18 1100          Moist Heat    MH Applied Yes  -AJ      Location Abdomen  -AJ      Rx Minutes 10 mins  -AJ      MH S/P Rx Yes  -AJ        User Key  (r) = Recorded By, (t) = Taken By, (c) = Cosigned By    Initials Name Provider Type    RIAZ Wells, PT Physical Therapist              Exercises       01/26/18 1100          Subjective Comments    Subjective Comments Patient wishes to get rid of the pain and be able to perform transitions better.  -AJ      Subjective Pain    Able to rate subjective pain? yes  -AJ      Pre-Treatment Pain Level 3  -AJ       "Post-Treatment Pain Level 0  -AJ      Exercise 1    Exercise Name 1 Pro II UE/LE  -AJ      Time (Minutes) 1 10 minutes  -AJ      Additional Comments L 4.0  -AJ      Exercise 2    Exercise Name 2 Sitting thoracic ext S  -AJ      Reps 2 10  -AJ      Time (Seconds) 2 10\" hold  -AJ      Exercise 3    Exercise Name 3 Seated ROT S  -AJ      Reps 3 2  -AJ      Time (Seconds) 3 30 seconds  -AJ      Exercise 4    Exercise Name 4 PPT  -AJ      Sets 4 2  -AJ      Reps 4 10  -AJ      Time (Seconds) 4 5\" hold  -AJ      Exercise 5    Exercise Name 5 Isometric abdominal contractions  -AJ      Sets 5 2  -AJ      Reps 5 10  -AJ      Time (Seconds) 5 5\" hold  -AJ      Exercise 6    Exercise Name 6 LTR  -AJ      Reps 6 10  -AJ      Time (Seconds) 6 10\" hold  -AJ        User Key  (r) = Recorded By, (t) = Taken By, (c) = Cosigned By    Initials Name Provider Type    AJ Edna Wells PT Physical Therapist                        Outcome Measure Options: 5x Sit to Stand, Other Outcome Measure  5 Times Sit to Stand  5 Times Sit to Stand (seconds): 51 seconds  5 Times Sit to Stand Comments: B UE A for sit up/down  Other Outcome Measure Tool Used  Other Outcome Measure Tool Comments: Sitting reachng to floor and back upright x5 = 22 seconds      Time Calculation:   Start Time: 1055  Stop Time: 1210  Time Calculation (min): 75 min  PT Non-Billable Time (min): 10 min  Total Timed Code Minutes- PT: 25 minute(s)     Therapy Charges for Today     Code Description Service Date Service Provider Modifiers Qty    03481044992 HC PT CHNG MAIN POS CURRENT 1/26/2018 Edna Wells PT GP, CK 1    52550248954 HC PT CHNG MAIN POS PROJECTED 1/26/2018 Edna Wells PT GP, CJ 1    33850339858 HC PT EVAL MOD COMPLEXITY 3 1/26/2018 Edna Wells PT GP 1    55614133942 HC PT THER PROC EA 15 MIN 1/26/2018 Edna Wells PT GP 2    52319372306 HC PT THER SUPP EA 15 MIN 1/26/2018 Edna Wells PT GP 1          PT " G-Codes  Outcome Measure Options: 5x Sit to Stand, Other Outcome Measure  Functional Limitation: Changing and maintaining body position  Changing and Maintaining Body Position Current Status (): At least 40 percent but less than 60 percent impaired, limited or restricted  Changing and Maintaining Body Position Goal Status (): At least 20 percent but less than 40 percent impaired, limited or restricted         Edna Wells, PT, DPT, CSCS  1/26/2018

## 2018-01-31 ENCOUNTER — HOSPITAL ENCOUNTER (OUTPATIENT)
Dept: PHYSICAL THERAPY | Facility: HOSPITAL | Age: 73
Setting detail: THERAPIES SERIES
Discharge: HOME | End: 2018-01-31

## 2018-01-31 DIAGNOSIS — M79.18 MUSCULOSKELETAL PAIN, CHRONIC: ICD-10-CM

## 2018-01-31 DIAGNOSIS — G89.29 MUSCULOSKELETAL PAIN, CHRONIC: ICD-10-CM

## 2018-01-31 DIAGNOSIS — R10.9 STOMACH PAIN: Primary | ICD-10-CM

## 2018-01-31 PROCEDURE — 97110 THERAPEUTIC EXERCISES: CPT | Performed by: PHYSICAL THERAPIST

## 2018-02-02 ENCOUNTER — HOSPITAL ENCOUNTER (OUTPATIENT)
Dept: PHYSICAL THERAPY | Facility: HOSPITAL | Age: 73
Setting detail: THERAPIES SERIES
Discharge: HOME | End: 2018-02-02

## 2018-02-02 DIAGNOSIS — G89.29 MUSCULOSKELETAL PAIN, CHRONIC: ICD-10-CM

## 2018-02-02 DIAGNOSIS — M79.18 MUSCULOSKELETAL PAIN, CHRONIC: ICD-10-CM

## 2018-02-02 DIAGNOSIS — R10.9 STOMACH PAIN: Primary | ICD-10-CM

## 2018-02-02 PROCEDURE — 97110 THERAPEUTIC EXERCISES: CPT | Performed by: PHYSICAL THERAPIST

## 2018-02-02 NOTE — THERAPY TREATMENT NOTE
"    Outpatient Physical Therapy Ortho Treatment Note  [facility]  Edna Wells, PT, DPT, CSCS       Patient Name: Sophia Neff  : 1945  MRN: [id number]  Today's Date: 2018      Visit Date: 2018     Pt reports 0/10 pain pre treatment, \"sore\"/10 pain post treatment  Reports 0% of improvement.  Attended 3/3 visits.  Insurance available: medical necessity and medicare cap  Next MD appt: TBD .  Recertification: 2018.    Visit Dx:    ICD-10-CM ICD-9-CM   1. Stomach pain R10.9 536.8   2. Musculoskeletal pain, chronic M79.1 729.1    G89.29 338.29            Past Medical History:   Diagnosis Date   • Anemia    • Arthritis    • Diabetes mellitus    • Fibromyalgia    • High cholesterol    • Hypertension    • Plantar fasciitis     Bilateral        Past Surgical History:   Procedure Laterality Date   • APPENDECTOMY  1970   • BREAST SURGERY Bilateral 1992    Reduction   • GALLBLADDER SURGERY  2004   • HYSTERECTOMY     • NECK SURGERY  1989   • PELVIC FLOOR REPAIR  2009   • PELVIC FLOOR REPAIR  2009             PT Ortho       18 0800    Subjective Comments    Subjective Comments patient reports she has no pain.  -    Subjective Pain    Able to rate subjective pain? yes  -    Pre-Treatment Pain Level 0  -    Post-Treatment Pain Level --   \"sore\"  -      18 1500    Subjective Comments    Subjective Comments Patient reports that she is tender, not even sore, but tender. Denies any pain.  -    Subjective Pain    Able to rate subjective pain? yes  -    Pre-Treatment Pain Level 0  -    Post-Treatment Pain Level --   \"Sore\"  -    Posture/Observations    Posture/Observations Comments No acute distress, no guarding, ambulating with no assistive device.  -RIAZ      User Key  (r) = Recorded By, (t) = Taken By, (c) = Cosigned By    Initials Name Provider Type    RIAZ Wells PT Physical Therapist    " "                        PT Assessment/Plan       02/02/18 0800       PT Assessment    Assessment Comments did well with all aquatic ther ex. no complaints with any exercise.  -     PT Plan    PT Frequency 2x/week   1L/1P PT only  -     PT Plan Comments next aquatics add yp core stab exercises  -       User Key  (r) = Recorded By, (t) = Taken By, (c) = Cosigned By    Initials Name Provider Type    RIAZ Wells, PT Physical Therapist                    Exercises       02/02/18 0800          Subjective Comments    Subjective Comments patient reports she has no pain.  -      Subjective Pain    Able to rate subjective pain? yes  -      Pre-Treatment Pain Level 0  -      Post-Treatment Pain Level --   \"sore\"  -      Aquatics    Aquatics performed? Yes  -      Exercise 1    Exercise Name 1 aqua; f/r/l/  -AJ      Time (Minutes) 1 3 min each  -      Exercise 2    Exercise Name 2 aqua; wand rot  -      Reps 2 15  -AJ      Exercise 3    Exercise Name 3 aqua; 2H cookie press downs  -      Reps 3 15  -AJ      Exercise 4    Exercise Name 4 aqua; 3-way slr b  -      Reps 4 10  -AJ      Exercise 5    Exercise Name 5 aqua; ms  -AJ      Reps 5 15  -AJ      Exercise 6    Exercise Name 6 aqua; dw scirrors  -AJ      Time (Minutes) 6 3 minutes  -AJ      Exercise 7    Exercise Name 7 aqua; dw bike  -AJ      Time (Minutes) 7 3 min  -      Exercise 8    Exercise Name 8 aqua; dw hang  -AJ      Time (Minutes) 8 3 min  -        User Key  (r) = Recorded By, (t) = Taken By, (c) = Cosigned By    Initials Name Provider Type    RIAZ Wells, PT Physical Therapist                               PT OP Goals       02/02/18 0800       PT Short Term Goals    STG Date to Achieve 02/16/18  -     STG 1 I with HEP and have additions/changes by next recertification.  -     STG 1 Progress Ongoing;Progressing  -     STG 2 AROM B lumbar SB WNL.  -     STG 2 Progress Ongoing  -     STG 3 AROM B thoracic " ROT WNL B.  -     STG 3 Progress Ongoing  -     STG 4 L hip flexion 4+/5.  -     STG 4 Progress Ongoing  -     STG 5 Able to tolerate 20 thoracic ROT to full range aquatically with no increase in pain.  -     STG 5 Progress Ongoing;Progressing  -     STG 5 Progress Comments did 15 today with no issues.  -     Long Term Goals    LTG Date to Achieve 03/09/18  -     LTG 1 AROM for lumbar spine and trunk all WNL, no increase in pain.  -     LTG 2 B LE 5/5, no increase in abdominal pain.  -     LTG 3 Able to perform transfers sitting to supine with no increase in pain.  -     LTG 4 Sit to stand x5, UE A as needed in <= 35 seconds.  -     LTG 5 Sitting forward flexion and return to siting x5 <= 15 seconds.  -     LTG 6 I with all aquatics.  -     LTG 7 I with land final HEP.  -     LTG 8 D/C with free 30 day fitness formula membership.  -     Time Calculation    PT Goal Re-Cert Due Date 02/16/18  -       User Key  (r) = Recorded By, (t) = Taken By, (c) = Cosigned By    Initials Name Provider Type     Edna Wells PT Physical Therapist          Therapy Education  Given: HEP  Program: Reinforced  How Provided: Verbal  Provided to: Patient  Level of Understanding: Verbalized              Time Calculation:   Start Time: 0806  Stop Time: 0846  Time Calculation (min): 40 min  Total Timed Code Minutes- PT: 40 minute(s)    Therapy Charges for Today     Code Description Service Date Service Provider Modifiers Qty    45236408775 HC PT THER PROC EA 15 MIN 2/2/2018 Edna Wells PT GP 3    74308766747 HC PT THER SUPP EA 15 MIN 2/2/2018 Edna Wells PT GP 1                    Edna Wells, PT, DPT, CSCS  2/2/2018

## 2018-02-06 ENCOUNTER — HOSPITAL ENCOUNTER (OUTPATIENT)
Dept: PHYSICAL THERAPY | Facility: HOSPITAL | Age: 73
Setting detail: THERAPIES SERIES
Discharge: HOME | End: 2018-02-06

## 2018-02-06 DIAGNOSIS — G89.29 MUSCULOSKELETAL PAIN, CHRONIC: ICD-10-CM

## 2018-02-06 DIAGNOSIS — M79.18 MUSCULOSKELETAL PAIN, CHRONIC: ICD-10-CM

## 2018-02-06 DIAGNOSIS — R10.9 STOMACH PAIN: Primary | ICD-10-CM

## 2018-02-06 PROCEDURE — 97110 THERAPEUTIC EXERCISES: CPT | Performed by: PHYSICAL THERAPIST

## 2018-02-06 NOTE — THERAPY TREATMENT NOTE
Outpatient Physical Therapy Ortho Treatment Note  [facility]  Edna Wells, PT, DPT, CSCS       Patient Name: Sophia Neff  : 1945  MRN: [id number]  Today's Date: 2018      Visit Date: 2018     Pt reports 0/10 pain pre treatment, 0/10 pain post treatment  Reports 0% of improvement.  Attended 4/4 visits.  Insurance available: medical necessity and medicare cap  Next MD appt: TBDIPAK .  Recertification: 2018.    Visit Dx:    ICD-10-CM ICD-9-CM   1. Stomach pain R10.9 536.8   2. Musculoskeletal pain, chronic M79.1 729.1    G89.29 338.29          Past Medical History:   Diagnosis Date   • Anemia    • Arthritis    • Diabetes mellitus    • Fibromyalgia    • High cholesterol    • Hypertension    • Plantar fasciitis     Bilateral        Past Surgical History:   Procedure Laterality Date   • APPENDECTOMY  1970   • BREAST SURGERY Bilateral 1992    Reduction   • GALLBLADDER SURGERY  2004   • HYSTERECTOMY     • NECK SURGERY  1989   • PELVIC FLOOR REPAIR  2009   • PELVIC FLOOR REPAIR  2009             PT Ortho       18 0800    Subjective Pain    Able to rate subjective pain? yes  -RIAZ    Post-Treatment Pain Level 0  -RIAZ    Posture/Observations    Posture/Observations Comments No acute distress.  -RIAZ      User Key  (r) = Recorded By, (t) = Taken By, (c) = Cosigned By    Initials Name Provider Type    RIAZ Wells, PT Physical Therapist                            PT Assessment/Plan       18 0800       PT Assessment    Assessment Comments Patient had difficulty laying supine. Also still has poor log roll technique. Does fine with all ofther ther ec.  -     PT Plan    PT Frequency 2x/week  -     PT Plan Comments Next land visit try laying supine again and review log roll technique.  -       User Key  (r) = Recorded By, (t) = Taken By, (c) = Cosigned By    Initials Name Provider Type    RIAZ Villar  "Russell, PT Physical Therapist                    Exercises       02/06/18 0800          Subjective Comments    Subjective Comments Patient reports that pain wise she is good this morning. SHe also reports having a hectic morning.  -AJ      Subjective Pain    Able to rate subjective pain? yes  -AJ      Pre-Treatment Pain Level 0  -AJ      Post-Treatment Pain Level 0  -AJ      Exercise 1    Exercise Name 1 Pro II UE/LE  -AJ      Time (Minutes) 1 10 minutes  -AJ      Additional Comments L 6.0  -AJ      Exercise 2    Exercise Name 2 Sitting Thoracic ROT S  -AJ      Reps 2 10  -AJ      Time (Seconds) 2 10\" hold  -AJ      Exercise 3    Exercise Name 3 Thoracic ext S  -AJ      Reps 3 10  -AJ      Time (Seconds) 3 10\" hold  -AJ      Exercise 4    Exercise Name 4 Seated DD kettle ball OH lifts  -AJ      Sets 4 2  -AJ      Reps 4 10  -AJ      Additional Comments 4# kettle ball  -AJ      Exercise 5    Exercise Name 5 Seated DD kettle ball thoracic ROT  -AJ      Reps 5 10  -AJ      Additional Comments 4# kettle ball  -AJ      Exercise 6    Exercise Name 6 Seated DD alt marching  -AJ      Sets 6 2  -AJ      Reps 6 10  -AJ      Exercise 7    Exercise Name 7 B St. SB crunch  -AJ      Sets 7 2  -AJ      Reps 7 10  -AJ      Additional Comments 5# DB in hand  -AJ      Exercise 8    Exercise Name 8 LTR  -AJ      Reps 8 10  -AJ      Time (Seconds) 8 10\" hold  -AJ      Additional Comments Semireclined  -AJ      Exercise 9    Exercise Name 9 HLM  -AJ      Time (Minutes) 9 3 minutes  -AJ      Time (Seconds) 9 5\" hold  -AJ        User Key  (r) = Recorded By, (t) = Taken By, (c) = Cosigned By    Initials Name Provider Type    RIAZ Wells, PT Physical Therapist                               PT OP Goals       02/06/18 0800       PT Short Term Goals    STG Date to Achieve 02/16/18  -AJ     STG 1 I with HEP and have additions/changes by next recertification.  -AJ     STG 1 Progress Ongoing;Progressing  -AJ     STG 2 AROM B lumbar " SB WNL.  -     STG 2 Progress Ongoing  -     STG 3 AROM B thoracic ROT WNL B.  -     STG 3 Progress Ongoing  -     STG 4 L hip flexion 4+/5.  -     STG 4 Progress Ongoing  -     STG 5 Able to tolerate 20 thoracic ROT to full range aquatically with no increase in pain.  -     STG 5 Progress Ongoing;Progressing  -     Long Term Goals    LTG Date to Achieve 03/09/18  -     LTG 1 AROM for lumbar spine and trunk all WNL, no increase in pain.  -     LTG 2 B LE 5/5, no increase in abdominal pain.  -     LTG 3 Able to perform transfers sitting to supine with no increase in pain.  -     LTG 4 Sit to stand x5, UE A as needed in <= 35 seconds.  -     LTG 5 Sitting forward flexion and return to siting x5 <= 15 seconds.  -     LTG 6 I with all aquatics.  -     LTG 7 I with land final HEP.  -     LTG 8 D/C with free 30 day fitness formula membership.  -     Time Calculation    PT Goal Re-Cert Due Date 02/16/18  -       User Key  (r) = Recorded By, (t) = Taken By, (c) = Cosigned By    Initials Name Provider Type     Edna Wells PT Physical Therapist          Therapy Education  Given: HEP  Program: Reinforced  How Provided: Verbal  Provided to: Patient  Level of Understanding: Verbalized              Time Calculation:   Start Time: 0800  Stop Time: 0904  Time Calculation (min): 64 min  PT Non-Billable Time (min): 15 min  Total Timed Code Minutes- PT: 49 minute(s)    Therapy Charges for Today     Code Description Service Date Service Provider Modifiers Qty    86294019801 HC PT THER PROC EA 15 MIN 2/6/2018 Edna Wells PT GP 3    78589052647 HC PT THER SUPP EA 15 MIN 2/6/2018 Edna Wells, PT GP 1                    Edna Wells PT, DPT, CSCS  2/6/2018

## 2018-02-09 ENCOUNTER — HOSPITAL ENCOUNTER (OUTPATIENT)
Dept: PHYSICAL THERAPY | Facility: HOSPITAL | Age: 73
Setting detail: THERAPIES SERIES
Discharge: HOME | End: 2018-02-09

## 2018-02-09 DIAGNOSIS — M79.18 MUSCULOSKELETAL PAIN, CHRONIC: ICD-10-CM

## 2018-02-09 DIAGNOSIS — G89.29 MUSCULOSKELETAL PAIN, CHRONIC: ICD-10-CM

## 2018-02-09 DIAGNOSIS — R10.9 STOMACH PAIN: Primary | ICD-10-CM

## 2018-02-09 PROCEDURE — 97110 THERAPEUTIC EXERCISES: CPT | Performed by: PHYSICAL THERAPIST

## 2018-02-09 NOTE — THERAPY TREATMENT NOTE
"    Outpatient Physical Therapy Ortho Treatment Note  [facility]  Edna Wells, PT, DPT, CSCS       Patient Name: Sophia Neff  : 1945  MRN: [id number]  Today's Date: 2018      Visit Date: 2018     Pt reports 0/10 pain pre treatment, 0/10 pain post treatment  Reports \"I don't know\"% of improvement.  Attended 5/5 visits.  Insurance available: Medical necessity and medicare cap  Next MD appt: TBD .  Recertification: 2018.    Visit Dx:    ICD-10-CM ICD-9-CM   1. Stomach pain R10.9 536.8   2. Musculoskeletal pain, chronic M79.1 729.1    G89.29 338.29          Past Medical History:   Diagnosis Date   • Anemia    • Arthritis    • Diabetes mellitus    • Fibromyalgia    • High cholesterol    • Hypertension    • Plantar fasciitis     Bilateral        Past Surgical History:   Procedure Laterality Date   • APPENDECTOMY  1970   • BREAST SURGERY Bilateral 1992    Reduction   • GALLBLADDER SURGERY  2004   • HYSTERECTOMY     • NECK SURGERY  1989   • PELVIC FLOOR REPAIR  2009   • PELVIC FLOOR REPAIR  2009             PT Ortho       18 0800    Subjective Comments    Subjective Comments patient reports sore but no pain today.  -    Subjective Pain    Able to rate subjective pain? yes  -    Pre-Treatment Pain Level 0  -    Post-Treatment Pain Level 0  -      User Key  (r) = Recorded By, (t) = Taken By, (c) = Cosigned By    Initials Name Provider Type    RIAZ Wells PT Physical Therapist                            PT Assessment/Plan       18 0800       PT Assessment    Assessment Comments challenged by core  -     PT Plan    PT Frequency 2x/week  -     PT Plan Comments next aquatics add cookie with walking.  -       User Key  (r) = Recorded By, (t) = Taken By, (c) = Cosigned By    Initials Name Provider Type    RIAZ Wells PT Physical Therapist                    Exercises       " 02/09/18 0800          Subjective Comments    Subjective Comments patient reports sore but no pain today.  -AJ      Subjective Pain    Able to rate subjective pain? yes  -AJ      Pre-Treatment Pain Level 0  -AJ      Post-Treatment Pain Level 0  -AJ      Aquatics    Aquatics performed? Yes  -AJ      Exercise 1    Exercise Name 1 aqua; amb f/r/l/  -AJ      Time (Minutes) 1 5 min each  -AJ      Exercise 2    Exercise Name 2 aqua; want rot  -AJ      Reps 2 20  -AJ      Exercise 3    Exercise Name 3 aqua; yp shoulder flex/ext, and/add, horz abd/add  -AJ      Reps 3 15  -AJ      Exercise 4    Exercise Name 4 aqua; yp alt punches with trunk rot  -AJ      Reps 4 20  -AJ      Exercise 5    Exercise Name 5 aqua; b slr 3-way  -AJ      Reps 5 15  -AJ      Exercise 6    Exercise Name 6 aqua; 2H cookie push downs  -AJ      Reps 6 15  -AJ      Exercise 7    Exercise Name 7 aqua; cookie push pull back on wall  -AJ      Reps 7 15  -AJ      Exercise 8    Exercise Name 8 dw scissors  -AJ      Time (Minutes) 8 3min  -AJ      Exercise 9    Exercise Name 9 aqua; dw bike  -AJ      Time (Minutes) 9 3 min  -AJ        User Key  (r) = Recorded By, (t) = Taken By, (c) = Cosigned By    Initials Name Provider Type    RIAZ Wells, PT Physical Therapist                               PT OP Goals       02/09/18 0800       PT Short Term Goals    STG Date to Achieve 02/16/18  -     STG 1 I with HEP and have additions/changes by next recertification.  -AJ     STG 1 Progress Ongoing;Progressing  -     STG 2 AROM B lumbar SB WNL.  -     STG 2 Progress Ongoing  -     STG 3 AROM B thoracic ROT WNL B.  -     STG 3 Progress Ongoing  -     STG 4 L hip flexion 4+/5.  -     STG 4 Progress Ongoing  -     STG 5 Able to tolerate 20 thoracic ROT to full range aquatically with no increase in pain.  -     STG 5 Progress Met  -     Long Term Goals    LTG Date to Achieve 03/09/18  -     LTG 1 AROM for lumbar spine and trunk all WNL, no  increase in pain.  -     LTG 2 B LE 5/5, no increase in abdominal pain.  -     LTG 3 Able to perform transfers sitting to supine with no increase in pain.  -     LTG 4 Sit to stand x5, UE A as needed in <= 35 seconds.  -     LTG 5 Sitting forward flexion and return to siting x5 <= 15 seconds.  -     LTG 6 I with all aquatics.  -     LTG 7 I with land final HEP.  -     LTG 8 D/C with free 30 day fitness formula membership.  -     Time Calculation    PT Goal Re-Cert Due Date 02/16/18  -       User Key  (r) = Recorded By, (t) = Taken By, (c) = Cosigned By    Initials Name Provider Type    RIAZ Wells PT Physical Therapist          Therapy Education  Given: HEP, Symptoms/condition management, Posture/body mechanics  Program: Reinforced  How Provided: Verbal, Demonstration  Provided to: Patient  Level of Understanding: Verbalized, Demonstrated              Time Calculation:   Start Time: 0843  Stop Time: 0930  Time Calculation (min): 47 min  Total Timed Code Minutes- PT: 47 minute(s)    Therapy Charges for Today     Code Description Service Date Service Provider Modifiers Qty    23342667188  PT THER PROC EA 15 MIN 2/9/2018 Edna Wells PT GP 3    87702999731 HC PT THER SUPP EA 15 MIN 2/9/2018 Edna Wells PT GP 1                    Edna Wells PT, DPT, CSCS  2/9/2018

## 2018-02-13 ENCOUNTER — HOSPITAL ENCOUNTER (OUTPATIENT)
Dept: PHYSICAL THERAPY | Facility: HOSPITAL | Age: 73
Setting detail: THERAPIES SERIES
Discharge: HOME | End: 2018-02-13

## 2018-02-13 DIAGNOSIS — M79.18 MUSCULOSKELETAL PAIN, CHRONIC: ICD-10-CM

## 2018-02-13 DIAGNOSIS — G89.29 MUSCULOSKELETAL PAIN, CHRONIC: ICD-10-CM

## 2018-02-13 DIAGNOSIS — R10.9 STOMACH PAIN: Primary | ICD-10-CM

## 2018-02-13 PROCEDURE — 97110 THERAPEUTIC EXERCISES: CPT | Performed by: PHYSICAL THERAPIST

## 2018-02-13 NOTE — THERAPY TREATMENT NOTE
Outpatient Physical Therapy Ortho Treatment Note  [facility]  Edna Wells, PT, DPT, CSCS       Patient Name: Sophia Neff  : 1945  MRN: [id number]  Today's Date: 2018      Visit Date: 2018     Pt reports 0/10 pain pre treatment, 0/10 pain post treatment  Reports 0% of improvement.  Attended 6/6 visits.  Insurance available: Medical necessity and medicare cap  Next MD appt: TBDIPAK .  Recertification: 2018.      Visit Dx:    ICD-10-CM ICD-9-CM   1. Stomach pain R10.9 536.8   2. Musculoskeletal pain, chronic M79.1 729.1    G89.29 338.29            Past Medical History:   Diagnosis Date   • Anemia    • Arthritis    • Diabetes mellitus    • Fibromyalgia    • High cholesterol    • Hypertension    • Plantar fasciitis     Bilateral        Past Surgical History:   Procedure Laterality Date   • APPENDECTOMY  1970   • BREAST SURGERY Bilateral 1992    Reduction   • GALLBLADDER SURGERY  2004   • HYSTERECTOMY     • NECK SURGERY  1989   • PELVIC FLOOR REPAIR  2009   • PELVIC FLOOR REPAIR  2009             PT Ortho       18 0800    Subjective Comments    Subjective Comments Patient reports that she is sore, but no pain.  -AJ    Subjective Pain    Able to rate subjective pain? yes  -AJ    Pre-Treatment Pain Level 0  -    Post-Treatment Pain Level 0  -AJ    Posture/Observations    Posture/Observations Comments No acute distress.  -RIAZ      User Key  (r) = Recorded By, (t) = Taken By, (c) = Cosigned By    Initials Name Provider Type    RIAZ Wells PT Physical Therapist                            PT Assessment/Plan       18 0900       PT Assessment    Assessment Comments Required multiple cueing to perform proper log roll technique. Unable to lie completely supine due to increase in pain per patient.  -     PT Plan    PT Frequency 2x/week  -RIAZ       User Key  (r) = Recorded By, (t) = Taken By, (c) =  "Cosigned By    Initials Name Provider Type    AJ Edna Wells, PT Physical Therapist                Modalities       02/13/18 0800          Moist Heat    MH Applied Yes  -AJ      Location Abdomen  -AJ      Rx Minutes 15 mins  -AJ      MH S/P Rx Yes  -AJ        User Key  (r) = Recorded By, (t) = Taken By, (c) = Cosigned By    Initials Name Provider Type    AJ Edna Wells, PT Physical Therapist                Exercises       02/13/18 0800          Subjective Comments    Subjective Comments Patient reports that she is sore, but no pain.  -AJ      Subjective Pain    Able to rate subjective pain? yes  -AJ      Pre-Treatment Pain Level 0  -AJ      Post-Treatment Pain Level 0  -AJ      Exercise 1    Exercise Name 1 Pro II UE/LE  -AJ      Time (Minutes) 1 10 minutes  -AJ      Additional Comments L 6.0  -AJ      Exercise 2    Exercise Name 2 Sitting ROT S  -AJ      Reps 2 2  -AJ      Time (Seconds) 2 30 seconds  -AJ      Exercise 3    Exercise Name 3 Thoracic ext S  -AJ      Reps 3 10  -AJ      Time (Seconds) 3 10\" hold  -AJ      Exercise 4    Exercise Name 4 LTR  -AJ      Reps 4 10  -AJ      Time (Seconds) 4 10\" hold  -AJ      Additional Comments semireclined  -AJ      Exercise 5    Exercise Name 5 DKTC with Pball  -AJ      Reps 5 20  -AJ      Time (Seconds) 5 5\" hold  -AJ      Additional Comments semireclined  -AJ      Exercise 6    Exercise Name 6 Seated DD kettle ball OH lifts  -AJ      Sets 6 2  -AJ      Reps 6 10  -AJ      Additional Comments 4# kettle ball  -AJ      Exercise 7    Exercise Name 7 Seated DD Thoracic ROT  -AJ      Sets 7 2  -AJ      Reps 7 10  -AJ      Additional Comments 4# kettle  -AJ      Exercise 8    Exercise Name 8 Seated DD flexion and back to neutral with kettle ball  -AJ      Sets 8 2  -AJ      Reps 8 10  -AJ      Additional Comments 4# kettle  -AJ      Exercise 9    Exercise Name 9 Seated DD deadbug  -AJ      Sets 9 2  -AJ      Reps 9 10  -AJ      Exercise 10    Exercise Name " "10 DB SB Crunch B  -      Sets 10 2  -      Reps 10 10  -AJ      Additional Comments 5# DB  -      Exercise 11    Exercise Name 11 Sit to/from Stand with lumbar ext  -      Sets 11 2  -      Reps 11 10  -AJ      Time (Seconds) 11 5\" hold  -        User Key  (r) = Recorded By, (t) = Taken By, (c) = Cosigned By    Initials Name Provider Type    AJ Edna Wells, PT Physical Therapist                               PT OP Goals       02/13/18 0800       PT Short Term Goals    STG Date to Achieve 02/16/18  -     STG 1 I with HEP and have additions/changes by next recertification.  -     STG 1 Progress Met  -     STG 2 AROM B lumbar SB WNL.  -     STG 2 Progress Ongoing  -     STG 3 AROM B thoracic ROT WNL B.  -     STG 3 Progress Ongoing  -     STG 4 L hip flexion 4+/5.  -     STG 4 Progress Ongoing  -     STG 5 Able to tolerate 20 thoracic ROT to full range aquatically with no increase in pain.  -     STG 5 Progress Met  -     Long Term Goals    LTG Date to Achieve 03/09/18  -     LTG 1 AROM for lumbar spine and trunk all WNL, no increase in pain.  -     LTG 1 Progress Ongoing  -     LTG 2 B LE 5/5, no increase in abdominal pain.  -     LTG 2 Progress Ongoing  -     LTG 3 Able to perform transfers sitting to supine with no increase in pain.  -     LTG 3 Progress Ongoing  -     LTG 4 Sit to stand x5, UE A as needed in <= 35 seconds.  -     LTG 4 Progress Ongoing  -     LTG 5 Sitting forward flexion and return to siting x5 <= 15 seconds.  -     LTG 5 Progress Ongoing  -     LTG 6 I with all aquatics.  -     LTG 6 Progress Ongoing  -     LTG 7 I with land final HEP.  -     LTG 7 Progress Ongoing  -     LTG 8 D/C with free 30 day fitness formula membership.  -     Time Calculation    PT Goal Re-Cert Due Date 02/16/18  -       User Key  (r) = Recorded By, (t) = Taken By, (c) = Cosigned By    Initials Name Provider Type    AJ Edna Wells, PT " Physical Therapist          Therapy Education  Given: HEP, Mobility training, Pain management, Symptoms/condition management  Program: Reinforced  How Provided: Demonstration, Verbal  Provided to: Patient  Level of Understanding: Verbalized, Demonstrated              Time Calculation:   Start Time: 0825  Stop Time: 0947  Time Calculation (min): 82 min  PT Non-Billable Time (min): 15 min  Total Timed Code Minutes- PT: 67 minute(s)    Therapy Charges for Today     Code Description Service Date Service Provider Modifiers Qty    54597368054 HC PT THER PROC EA 15 MIN 2/13/2018 Edna Wells PT GP 4    74120315403 HC PT THER SUPP EA 15 MIN 2/13/2018 Edna Wells PT GP 1                    Edna Wells, PT, DPT, CSCS  2/13/2018

## 2018-02-15 ENCOUNTER — HOSPITAL ENCOUNTER (OUTPATIENT)
Dept: PHYSICAL THERAPY | Facility: HOSPITAL | Age: 73
Setting detail: THERAPIES SERIES
Discharge: HOME | End: 2018-02-15

## 2018-02-15 DIAGNOSIS — M79.18 MUSCULOSKELETAL PAIN, CHRONIC: ICD-10-CM

## 2018-02-15 DIAGNOSIS — R10.9 STOMACH PAIN: Primary | ICD-10-CM

## 2018-02-15 DIAGNOSIS — G89.29 MUSCULOSKELETAL PAIN, CHRONIC: ICD-10-CM

## 2018-02-15 PROCEDURE — 97110 THERAPEUTIC EXERCISES: CPT | Performed by: PHYSICAL THERAPIST

## 2018-02-15 NOTE — THERAPY TREATMENT NOTE
"    Outpatient Physical Therapy Ortho Treatment Note  [facility]  Edna Wells, PT, DPT, CSCS       Patient Name: Sophia Neff  : 1945  MRN: [id number]  Today's Date: 2/15/2018      Visit Date: 02/15/2018     Pt reports 0/10 pain pre treatment, 0/10 pain post treatment  Reports \"I'm not sure\"% of improvement.  Attended 7/7 visits.  Insurance available: medical necessity and medicare cap.  Next MD appt: KANE .  Recertification: 2018.    Visit Dx:    ICD-10-CM ICD-9-CM   1. Stomach pain R10.9 536.8   2. Musculoskeletal pain, chronic M79.1 729.1    G89.29 338.29       There is no problem list on file for this patient.       Past Medical History:   Diagnosis Date   • Anemia    • Arthritis    • Diabetes mellitus    • Fibromyalgia    • High cholesterol    • Hypertension    • Plantar fasciitis     Bilateral        Past Surgical History:   Procedure Laterality Date   • APPENDECTOMY  1970   • BREAST SURGERY Bilateral 1992    Reduction   • GALLBLADDER SURGERY  2004   • HYSTERECTOMY     • NECK SURGERY  1989   • PELVIC FLOOR REPAIR  2009   • PELVIC FLOOR REPAIR  2009             PT Ortho       02/15/18 0800    Subjective Comments    Subjective Comments patient reports she woke up last night with muscle cramps. she reports she did some of her stretches which helped. she reports she was able to go back to sleep.  -AJ    Subjective Pain    Able to rate subjective pain? yes  -    Pre-Treatment Pain Level 0  -    Post-Treatment Pain Level 0  -AJ    Posture/Observations    Posture/Observations Comments No acute distress.  -AJ      18 0800    Subjective Comments    Subjective Comments Patient reports that she is sore, but no pain.  -AJ    Subjective Pain    Able to rate subjective pain? yes  -    Pre-Treatment Pain Level 0  -    Post-Treatment Pain Level 0  -    Posture/Observations    Posture/Observations Comments No acute " distress.  -AJ      User Key  (r) = Recorded By, (t) = Taken By, (c) = Cosigned By    Initials Name Provider Type    RIAZ Wells, PT Physical Therapist                            PT Assessment/Plan       02/15/18 0800       PT Assessment    Assessment Comments did well with aquatics today. no complaints and progressing towards independance.  -AJ     PT Plan    PT Frequency 2x/week  -AJ     PT Plan Comments recert next visit.  -AJ       User Key  (r) = Recorded By, (t) = Taken By, (c) = Cosigned By    Initials Name Provider Type    RIAZ Wells, PT Physical Therapist                    Exercises       02/15/18 0800          Subjective Comments    Subjective Comments patient reports she woke up last night with muscle cramps. she reports she did some of her stretches which helped. she reports she was able to go back to sleep.  -AJ      Subjective Pain    Able to rate subjective pain? yes  -AJ      Pre-Treatment Pain Level 0  -AJ      Post-Treatment Pain Level 0  -AJ      Exercise 1    Exercise Name 1 aqua; amb f/r/l  -AJ      Time (Minutes) 1 3 min each  -AJ      Exercise 2    Exercise Name 2 aqua; st hip 3-way  -AJ      Reps 2 15  -AJ      Exercise 3    Exercise Name 3 aqua; wand rot  -AJ      Reps 3 20  -AJ      Exercise 4    Exercise Name 4 aqua; yp b shoulder flex/ext, abd/add, horz abd/add  -AJ      Reps 4 15  -AJ      Exercise 5    Exercise Name 5 aqua; yp alt forward punches with trunk rot  -AJ      Reps 5 15  -AJ      Exercise 6    Exercise Name 6 aqua; 2h cookie push downs  -AJ      Reps 6 15  -AJ      Exercise 7    Exercise Name 7 aqua; cookie push pull lunge stance  -AJ      Reps 7 15  -AJ      Additional Comments each LE lead  -AJ      Exercise 8    Exercise Name 8 aqua; dw flutter kick  -AJ      Time (Minutes) 8 3 min  -AJ      Exercise 9    Exercise Name 9 aqua; dw scissors  -AJ      Time (Minutes) 9 3 min  -AJ      Exercise 10    Exercise Name 10 aqua; dw bicycle  -AJ      Time  (Minutes) 10 3 min  -        User Key  (r) = Recorded By, (t) = Taken By, (c) = Cosigned By    Initials Name Provider Type    RIAZ Wells PT Physical Therapist                               PT OP Goals       02/15/18 0800       PT Short Term Goals    STG Date to Achieve 02/16/18  -     STG 1 I with HEP and have additions/changes by next recertification.  -     STG 1 Progress Met  -     STG 2 AROM B lumbar SB WNL.  -     STG 2 Progress Ongoing  -     STG 3 AROM B thoracic ROT WNL B.  -     STG 3 Progress Ongoing  -     STG 4 L hip flexion 4+/5.  -     STG 4 Progress Ongoing  -     STG 5 Able to tolerate 20 thoracic ROT to full range aquatically with no increase in pain.  -     STG 5 Progress Met  -     Long Term Goals    LTG Date to Achieve 03/09/18  -     LTG 1 AROM for lumbar spine and trunk all WNL, no increase in pain.  -     LTG 1 Progress Ongoing  -     LTG 2 B LE 5/5, no increase in abdominal pain.  -     LTG 2 Progress Ongoing  -     LTG 3 Able to perform transfers sitting to supine with no increase in pain.  -     LTG 3 Progress Ongoing  -     LTG 4 Sit to stand x5, UE A as needed in <= 35 seconds.  -     LTG 4 Progress Ongoing  -     LTG 5 Sitting forward flexion and return to siting x5 <= 15 seconds.  -     LTG 5 Progress Ongoing  -     LTG 6 I with all aquatics.  -     LTG 6 Progress Ongoing;Progressing;Partially Met  -     LTG 7 I with land final HEP.  -     LTG 7 Progress Ongoing  -     LTG 8 D/C with free 30 day fitness formula membership.  -     Time Calculation    PT Goal Re-Cert Due Date 02/16/18  -       User Key  (r) = Recorded By, (t) = Taken By, (c) = Cosigned By    Initials Name Provider Type    RIAZ Wells PT Physical Therapist          Therapy Education  Given: HEP  Program: Reinforced  How Provided: Verbal  Provided to: Patient  Level of Understanding: Verbalized              Time Calculation:   Start Time:  0800  Stop Time: 0846  Time Calculation (min): 46 min  Total Timed Code Minutes- PT: 46 minute(s)    Therapy Charges for Today     Code Description Service Date Service Provider Modifiers Qty    52212459089  PT THER PROC EA 15 MIN 2/15/2018 Edna Wells PT GP 3    [id number]  PT THER SUPP EA 15 MIN 2/15/2018 Edna Wells PT GP 1                    Edna Wells PT, DPT, CSCS  2/15/2018

## 2018-02-19 ENCOUNTER — HOSPITAL ENCOUNTER (OUTPATIENT)
Dept: PHYSICAL THERAPY | Facility: HOSPITAL | Age: 73
Setting detail: THERAPIES SERIES
Discharge: HOME | End: 2018-02-19

## 2018-02-19 DIAGNOSIS — R10.9 STOMACH PAIN: Primary | ICD-10-CM

## 2018-02-19 DIAGNOSIS — G89.29 MUSCULOSKELETAL PAIN, CHRONIC: ICD-10-CM

## 2018-02-19 DIAGNOSIS — M79.18 MUSCULOSKELETAL PAIN, CHRONIC: ICD-10-CM

## 2018-02-19 PROCEDURE — 97150 GROUP THERAPEUTIC PROCEDURES: CPT | Performed by: PHYSICAL THERAPIST

## 2018-02-19 PROCEDURE — G8982 BODY POS GOAL STATUS: HCPCS | Performed by: PHYSICAL THERAPIST

## 2018-02-19 PROCEDURE — G8983 BODY POS D/C STATUS: HCPCS | Performed by: PHYSICAL THERAPIST

## 2018-02-19 NOTE — THERAPY DISCHARGE NOTE
"     Outpatient Physical Therapy Ortho Progress Note/Discharge Summary  [facility]  Edna Wells, PT, DPT, CSCS       Patient Name: Sophia Neff  : 1945  MRN: [id number]  Today's Date: 2018      Visit Date: 2018     Pt reports 0/10 pain pre treatment, 0/10 pain post treatment  Reports \"SOme, but I don't know how much\"% of improvement.  Attended / visits.  Insurance available: medical necessity and medicare cap  Next MD appt: TBD .  Recertification: N/A    Visit Dx:    ICD-10-CM ICD-9-CM   1. Stomach pain R10.9 536.8   2. Musculoskeletal pain, chronic M79.1 729.1    G89.29 338.29     Number of days off work: N/A    Changes to medications: None noted.    Changes to MD orders: None noted.     Past Medical History:   Diagnosis Date   • Anemia    • Arthritis    • Diabetes mellitus    • Fibromyalgia    • High cholesterol    • Hypertension    • Plantar fasciitis     Bilateral        Past Surgical History:   Procedure Laterality Date   • APPENDECTOMY  1970   • BREAST SURGERY Bilateral 1992    Reduction   • GALLBLADDER SURGERY  2004   • HYSTERECTOMY     • NECK SURGERY  1989   • PELVIC FLOOR REPAIR  2009   • PELVIC FLOOR REPAIR  2009             PT Ortho       18 0800    Subjective Comments    Subjective Comments Patient repots she is just sore.  -AJ    Subjective Pain    Able to rate subjective pain? yes  -AJ    Pre-Treatment Pain Level 0  -AJ    Post-Treatment Pain Level 0  -AJ    Posture/Observations    Alignment Options Forward head;Cervical lordosis;Thoracic kyphosis;Rounded shoulders;Lumbar lordosis  -AJ    Forward Head Mild;Moderate;Increased  -AJ    Cervical Lordosis Mild;Moderate;Increased  -AJ    Thoracic Kyphosis Mild;Increased  -AJ    Rounded Shoulders Bilateral:;Mild;Increased  -AJ    Lumbar lordosis Normal  -AJ    Posture/Observations Comments No acute distress.  -AJ    Quarter Clearing    Quarter " Clearing --   Sensation is intact to crude touch  -    Lumbar/SI Special Tests    Standing Flexion Test (SI Dysfunction) Bilateral:;Negative  -AJ    Stork Test (SI Dysfunction) Bilateral:;Negative  -AJ    Trendelenburg Test (Gluteus Medius Weakness) Bilateral:;Negative  -AJ    Seated Flexion Test (SI Dysfunction) Bilateral:;Negative  -AJ    Slump Test (Neural Tension) Bilateral:;Negative  -AJ    SLR (Neural Tension) Bilateral:;Negative  -AJ    SI Compression Test (SI Dysfunction) Bilateral:;Negative  -AJ    SI Distraction Test (SI Dysfunction) Bilateral:;Negative  -AJ    KRUNAL (hip vs. SI Dysfunction) Bilateral:;Negative  -AJ    FAIR Test (Piriformis Syndrome) Bilateral:;Negative  -AJ    Juan José's Signs    Superficial and non-anatomical tenderness Positive  -AJ    Simulation test Negative  -AJ    Distraction straight leg raise test (sitting vs supine) Positive  -AJ    Regional disturbances Negative  -AJ    Overreaction to examination Positive  -AJ    Trunk    Flexion AROM --   105° to the toes  -AJ    Extension AROM --   47°  -AJ    Left Lateral Flexion AROM WNL (0-35 degrees)  -AJ    Right Lateral Flexion AROM WNL (0-35 degrees)  -AJ    Left Rotation AROM WNL (0-45 degrees)  -AJ    Right Rotation AROM WNL (0-45 degrees)  -AJ    MMT (Manual Muscle Testing)    General MMT Assessment Detail B LE 5/5, no change in pain.  -    Flexibility    Flexibility Tested? --   NO tightness noted.  -    Lower Extremity Flexibility    Hip Flexors Bilateral:;Mildly limited  -    Pathomechanics    Spine Pathomechanics Excessive thoracic kyphosis with forward bend  -    Transfers    Transfer, Comment I with all transfers, with good log rolltechnique. COmplains of pain once supine, but none with the transfer.  -    Gait Assessment/Treatment    Gait, [place] Level independent;conditional independence  -    Gait, Comment No assistive devoice today, reports forgot SC. Mild occasional limp.  -      User Key  (r) =  Recorded By, (t) = Taken By, (c) = Cosigned By    Initials Name Provider Type    RIAZ Wells, PT Physical Therapist         Barriers to Rehab: Include significant or possible arthritic/degenerative changes that have occurred within the spine, The patient's obesity, The patient's generally deconditioned state, Possible poor/questionable compliance with HEP.    Safety Issues: None noted.            PT Assessment/Plan       02/19/18 0800       PT Assessment    Functional Limitations Limitation in home management;Limitations in community activities;Performance in leisure activities;Performance in self-care ADL  -AJ     Impairments Balance;Endurance;Impaired muscle endurance;Impaired muscle power;Pain;Posture  -AJ     Assessment Comments Patient met all goals except a few LTGs. SOme appears to be self limiting. Patient I with final land and aquatic HEP.  -AJ     Rehab Potential Fair  -AJ     Patient/caregiver participated in establishment of treatment plan and goals Yes  -AJ     Patient would benefit from skilled therapy intervention No  -AJ     PT Plan    PT Frequency --   N/A  -AJ     Predicted Duration of Therapy Intervention (days/wks) N/A  -AJ     PT Plan Comments D/C today with a final HEP and free 30 day fitness formula membership.  -AJ       User Key  (r) = Recorded By, (t) = Taken By, (c) = Cosigned By    Initials Name Provider Type    RIAZ Wells, PT Physical Therapist                Modalities       02/19/18 0800          Moist Heat    MH Applied Yes  -AJ      Location Abdomen  -AJ      Rx Minutes 15 mins  -AJ      MH S/P Rx Yes  -AJ        User Key  (r) = Recorded By, (t) = Taken By, (c) = Cosigned By    Initials Name Provider Type    RIAZ Wells, PT Physical Therapist                Exercises       02/19/18 0800          Subjective Comments    Subjective Comments Patient repots she is just sore.  -AJ      Subjective Pain    Able to rate subjective pain? yes  -AJ       "Pre-Treatment Pain Level 0  -AJ      Post-Treatment Pain Level 0  -AJ      Exercise 1    Exercise Name 1 Pro II UE/LE  -AJ      Time (Minutes) 1 10 minutes  -AJ      Additional Comments L 6.0  -AJ      Exercise 2    Exercise Name 2 Sitting ROT S  -AJ      Reps 2 2  -AJ      Time (Seconds) 2 30 seconds  -AJ      Exercise 3    Exercise Name 3 Thoracic ext S  -AJ      Reps 3 10  -AJ      Time (Seconds) 3 10\" hold  -AJ      Exercise 4    Exercise Name 4 Sitting and reaching to floor  -AJ      Reps 4 5  -AJ      Exercise 5    Exercise Name 5 Sit to/from Stand B UE A  -AJ      Reps 5 5  -AJ      Exercise 6    Exercise Name 6 LTR  -AJ      Reps 6 10  -AJ      Time (Seconds) 6 10\" hold  -AJ      Exercise 7    Exercise Name 7 Bridges  -AJ      Sets 7 2  -AJ      Reps 7 10  -AJ      Time (Seconds) 7 5\" hold  -AJ      Exercise 8    Exercise Name 8 Isometric abdominal contractions  -AJ      Reps 8 20  -AJ      Time (Seconds) 8 5\" hold  -AJ      Exercise 9    Exercise Name 9 PPT  -AJ      Sets 9 2  -AJ      Reps 9 10  -AJ      Time (Seconds) 9 5\" hold  -AJ        User Key  (r) = Recorded By, (t) = Taken By, (c) = Cosigned By    Initials Name Provider Type    AJ Edna Wells, PT Physical Therapist                               PT OP Goals       02/19/18 0800       PT Short Term Goals    STG Date to Achieve 02/16/18  -     STG 1 I with HEP and have additions/changes by next recertification.  -     STG 1 Progress Met  -     STG 2 AROM B lumbar SB WNL.  -     STG 2 Progress Met  -     STG 3 AROM B thoracic ROT WNL B.  -     STG 3 Progress Met  -     STG 4 L hip flexion 4+/5.  -     STG 4 Progress Ongoing  -     STG 5 Able to tolerate 20 thoracic ROT to full range aquatically with no increase in pain.  -     STG 5 Progress Met  -     Long Term Goals    LTG Date to Achieve 03/09/18  -     LTG 1 AROM for lumbar spine and trunk all WNL, no increase in pain.  -     LTG 1 Progress Met  -     LTG 2 B LE " 5/5, no increase in abdominal pain.  -     LTG 2 Progress Met  -     LTG 3 Able to perform transfers sitting to supine with no increase in pain.  -     LTG 3 Progress Partially Met  -     LTG 3 Progress Comments Met transfers part of goal, cannot sustain supine lying at all though.  -     LTG 4 Sit to stand x5, UE A as needed in <= 35 seconds.  -     LTG 4 Progress Ongoing;Not Met  -     LTG 5 Sitting forward flexion and return to siting x5 <= 15 seconds.  -     LTG 5 Progress Ongoing;Not Met  -     LTG 6 I with all aquatics.  -     LTG 6 Progress Met  -     LTG 7 I with land final HEP.  -[facility] 7 Progress Met  -     LT 8 D/C with free 30 day fitness formula membership.  -[facility] 8 Progress Met  -     Time Calculation    PT Goal Re-Cert Due Date --   N/A  -       User Key  (r) = Recorded By, (t) = Taken By, (c) = Cosigned By    Initials Name Provider Type    RIAZ Wells, PT Physical Therapist          Therapy Education  Given: HEP, Symptoms/condition management, Pain management, Posture/body mechanics, Fall prevention and home safety, Mobility training (POC)  Program: Reinforced  How Provided: Verbal, Demonstration  Provided to: Patient  Level of Understanding: Verbalized, Demonstrated    Outcome Measure Options: 5x Sit to Stand, Other Outcome Measure  5 Times Sit to Stand  5 Times Sit to Stand (seconds): 52 seconds  5 Times Sit to Stand Comments: B UE A for sit to/from stand  Other Outcome Measure Tool Used  Other Outcome Measure Tool Comments: Sitting and reaching to floor and back to upright x5 = 40 seconds      Time Calculation:   Start Time: 0800  Stop Time: 0900  Time Calculation (min): 60 min  PT Non-Billable Time (min): 15 min    Therapy Charges for Today     Code Description Service Date Service Provider Modifiers Qty    73988020995  PT [facility] MAIN POS PROJECTED 2/19/2018 Edna Wells, PT GP, CI 1    [id number]  PT [facility] MAIN POS DISCHARGE 2/19/2018  Edna Wells, PT GP, CJ 1    [id number]  PT THER PROC GROUP 2/19/2018 Edna Wells, PT GP 1    [id number] HC PT THER SUPP EA 15 MIN 2/19/2018 Edna Wells PT GP 3          PT G-Codes  Outcome Measure Options: 5x Sit to Stand, Other Outcome Measure  Functional Limitation: Changing and maintaining body position  Changing and Maintaining Body Position Goal Status (): At least 1 percent but less than 20 percent impaired, limited or restricted  Changing and Maintaining Body Position Discharge Status (): At least 20 percent but less than 40 percent impaired, limited or restricted     OP PT Discharge Summary  Date of Discharge: 02/19/18  Reason for Discharge: Maximum functional potential achieved, Independent  Outcomes Achieved: Patient able to partially acheive established goals  Discharge Destination: Home with home program  Discharge Instructions: D/C with a final HEP and free 30 day fitness formula membership.      Edna Wells, PT, DPT, CSCS  2/19/2018

## 2018-02-21 ENCOUNTER — APPOINTMENT (OUTPATIENT)
Dept: PHYSICAL THERAPY | Facility: HOSPITAL | Age: 73
End: 2018-02-21

## 2018-05-08 ENCOUNTER — TRANSCRIBE ORDERS (OUTPATIENT)
Dept: PHYSICAL THERAPY | Facility: HOSPITAL | Age: 73
End: 2018-05-08

## 2018-05-08 ENCOUNTER — HOSPITAL ENCOUNTER (OUTPATIENT)
Dept: PHYSICAL THERAPY | Facility: HOSPITAL | Age: 73
Setting detail: THERAPIES SERIES
Discharge: HOME | End: 2018-05-08

## 2018-05-08 DIAGNOSIS — M54.2 POSTERIOR NECK PAIN: Primary | ICD-10-CM

## 2018-05-08 DIAGNOSIS — M54.2 MUSCULOSKELETAL NECK PAIN: Primary | ICD-10-CM

## 2018-05-08 DIAGNOSIS — M40.03 POSTURAL KYPHOSIS OF CERVICOTHORACIC REGION: ICD-10-CM

## 2018-05-08 PROCEDURE — 97110 THERAPEUTIC EXERCISES: CPT | Performed by: PHYSICAL THERAPIST

## 2018-05-08 PROCEDURE — 97162 PT EVAL MOD COMPLEX 30 MIN: CPT | Performed by: PHYSICAL THERAPIST

## 2018-05-08 PROCEDURE — G8981 BODY POS CURRENT STATUS: HCPCS | Performed by: PHYSICAL THERAPIST

## 2018-05-08 PROCEDURE — G8982 BODY POS GOAL STATUS: HCPCS | Performed by: PHYSICAL THERAPIST

## 2018-05-08 NOTE — THERAPY EVALUATION
Outpatient Physical Therapy Ortho Initial Evaluation  [facility]  Edna Wells, PT, DPT, CSCS       Patient Name: Sophia Neff  : 1945  MRN: [id number]  Today's Date: 2018      Visit Date: 2018    Pt reports 1/10 pain pre treatment, 0/10 pain post treatment  Reports N/A% of improvement.  Attended  visits.  Insurance available: medicare guidelines  Next MD appt: [email or web address] .  Recertification: 2018.     Past Medical History:   Diagnosis Date   • Anemia    • Arthritis    • Diabetes mellitus    • Fibromyalgia    • High cholesterol    • Hypertension    • Plantar fasciitis     Bilateral        Past Surgical History:   Procedure Laterality Date   • APPENDECTOMY  1970   • BREAST SURGERY Bilateral 1992    Reduction   • GALLBLADDER SURGERY  2004   • HYSTERECTOMY     • NECK SURGERY  1989   • PELVIC FLOOR REPAIR  2009   • PELVIC FLOOR REPAIR  2009       Visit Dx:     ICD-10-CM ICD-9-CM   1. Musculoskeletal neck pain M54.2 723.1   2. Postural kyphosis of cervicothoracic region M40.03 737.10     Number of days off work: N/A     Patient is .     Patient has grown children.     Medications:   Glucotrol 10mg  Januvia 50mg  Hydralazine 10mg  Cozaar EQ 25mg  Docusate Sodium 100mg  Pravastatin 40mg  Claratin 10mg  Robasin 500mg  Clacium 600mg with Vit D 125 IU  Multi-vitamin- once a day women's  Tylenol arthritis 650mg (no asprain)  Voltaren topical gel 1%  Triamcinolone Acet 454GM-Top 0.1% oint        Allergies:           Aciphex [Rabeprazole Sodium]   Adalat [Nifedipine]   Amoxicillin   Bextra [Valdecoxib]   Celebrex [Celecoxib]   Codeine   Cortisone   Excedrin Back & [Acetaminophen-aspirin Buffered]   Feldene [Piroxicam]   Lodine [Etodolac]   Maalox [Calcium Carbonate Antacid]   Maxidex [Dexamethasone]   Milk Of Magnesia [Magnesium Hydroxide]   Mobic [Meloxicam]   Morphine And Related   Motrin [Ibuprofen]   Naproxen    Periactin [Cyproheptadine]   Prevacid [Lansoprazole]   Prilosec [Omeprazole]   Toradol [Ketorolac Tromethamine]   Valium [Diazepam]   Vioxx [Rofecoxib]   Voltaren [Diclofenac Sodium]     Panfilo as Reviewed Reviewed by You at 11:03 AM.   Iriseofluvin Ultramicrosize  Magcitrate  BC/Goody Powder             Patient History     Row Name 05/08/18 1500             History    Chief Complaint Pain  -AJ      Type of Pain Neck pain  -AJ      Date Current Problem(s) Began 05/02/18  -AJ      Brief Description of Current Complaint Patient reports she woke up one morning and she was hurting but it wasn't a strong pain. SHe reports she sat down at her deck to do something and she got a stong pan and couldn't turn. SHe reports she just went back and laid down. She reports it is on/off since then.  -AJ      Previous treatment for THIS PROBLEM Medication  -AJ      Patient/Caregiver Goals Relieve pain;Return to prior level of function  -AJ      Current Tobacco Use None  -AJ      Smoking Status Former Smoker, quite years ago.  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 No, family MD  -AJ      What clinical tests have you had for this problem? X-ray  -AJ      Results of Clinical Tests no results yet.  -AJ      History of Previous Related Injuries None  -AJ      Are you or can you be pregnant No  -AJ         Pain     Pain Location Neck  -AJ      Pain at Present 1  -AJ      Pain at Best 0  -AJ      Pain at Worst 10  -AJ      Pain Frequency Intermittent  -AJ      Pain Description Discomfort;Dull  -AJ      What Performance Factors Make the Current Problem(s) WORSE? turning head too quick, sudden movements  -AJ      What Performance Factors Make the Current Problem(s) BETTER? Heat  -AJ      Is  your sleep disturbed? No  -AJ      Is medication used to assist with sleep? Yes  -AJ      What position do you sleep in? --   semireclined, adjustable nbed, head and legs eelvated  -AJ      Difficulties at work? None  -AJ      Difficulties with ADL's? None  -AJ      Difficulties with recreational activities? Computer work, reading  -AJ        User Key  (r) = Recorded By, (t) = Taken By, (c) = Cosigned By    Initials Name Provider Type     Edna Wells PT Physical Therapist                PT Ortho     Row Name 05/08/18 1500       Subjective Comments    Subjective Comments patient wants to get the pain to go away and feel better..  -AJ       Subjective Pain    Able to rate subjective pain? yes  -AJ    Pre-Treatment Pain Level 1  -AJ    Post-Treatment Pain Level 0  -       Posture/Observations    Alignment Options Forward head;Cervical lordosis;Thoracic kyphosis;Rounded shoulders;Lumbar lordosis  -AJ    Forward Head Mild;Moderate;Increased  -AJ    Cervical Lordosis Mild;Moderate;Increased  -AJ    Thoracic Kyphosis Mild;Moderate;Increased  -AJ    Rounded Shoulders Bilateral:;Mild;Increased  -AJ    Lumbar lordosis Normal  -    Posture/Observations Comments No acute distress, good overall postural awareness.  -AJ       Cervical/Thoracic Special Tests    Spurlings (Foraminal Compression) Negative  -AJ    Cervical Compression (Forarminal Compression vs. Facet Pain) Bilateral:;Negative  -AJ    Cervical Distraction (Foraminal Compression vs. Facet Pain) Bilateral:;Negative  -AJ    Transverse Ligament (Instability) Negative  -AJ    Vertebral Artery Test (VBI Sign) Bilateral:;Negative  -AJ       Juan José's Signs    Superficial and non-anatomical tenderness Positive  -    Simulation test Negative  -AJ    Distraction straight leg raise test (sitting vs supine) Negative  -AJ    Regional disturbances Positive  -    Overreaction to examination Positive  -AJ       Head/Neck/Trunk    Neck Extension AROM 35°  -AJ     "Neck Flexion AROM 25°  -AJ    Neck Lt Lateral Flexion AROM 15°  -AJ    Neck Rt Lateral Flexion AROM 20°  -AJ    Neck Lt Rotation AROM 40°  -AJ    Neck Rt Rotation AROM 40°  -AJ       MMT (Manual Muscle Testing)    Additional Documentation General Assessment (Manual Muscle Testing) (Group)  -       General Assessment (Manual Muscle Testing)    Comment, General Manual Muscle Testing (MMT) Assessment B UE 5/5, no change in neck pain.. Neck 4-/5 all guarded with pain complaints.  -AJ       Sensation    Sensation WNL? WFL  -AJ    Light Touch No apparent deficits  -AJ    Additional Comments Patient c/o \"fibro pain\" and sensitivity  -       Upper Extremity Flexibility    SCM Bilateral:;Mildly limited  -AJ    Upper Trapezius Bilateral:;Mildly limited;Moderately limited  -AJ    Levator Scapula Bilateral:;Mildly limited;Moderately limited  -AJ       Pathomechanics    Spine Pathomechanics Hinges into extension in lower cervical;Limited upper thoracic motion with cervical ROM  -AJ       Transfers    Comment (Transfers) I with all transfers  -       Gait/Stairs Assessment/Training    Comment (Gait/Stairs) FWB, non-antalgic gait, SC in UE, normal arm swing with gait otherie. No use of AD [facility] and normal arm swing with gait.  -      User Key  (r) = Recorded By, (t) = Taken By, (c) = Cosigned By    Initials Name Provider Type     Paula Ahumada, PTA Physical Therapy Assistant    RIAZ Wells, PT Physical Therapist                      Therapy Education  Given: HEP, Symptoms/condition management, Posture/body mechanics, Pain management (POC)  Program: New  How Provided: Verbal, Demonstration, Written  Provided to: Patient  Level of Understanding: Verbalized, Demonstrated           PT OP Goals     Row Name 05/08/18 1500          PT Short Term Goals    STG Date to Achieve 05/29/18  -     STG 1 I with HEP and have additions/changes by next recertification.  -AJ     STG 2 AROm B cervical SB >= 30°  -AJ     " STG 3 AROm B cervical ROT >= 55°.  -     STG 4 AROm cervical flex/ext >= 40° each  -     STG 5 C-spine 4/5 or greater in all directions.  -        Long Term Goals    LTG 1 AROM for cervical spine all WNL, no increase in pain.  -     LTG 2 C-spine 4+/5 or greater in all directions.  -     LTG 3 Patient to be more aware of posture and posture correction technique..  -     LTG 4 I with final HEP.  -     LTG 5 D/C with free 30 day fitness formula membership.  -        Time Calculation    PT Goal Re-Cert Due Date 05/29/18  -       User Key  (r) = Recorded By, (t) = Taken By, (c) = Cosigned By    Initials Name Provider Type    AJ Edna Wlels, PT Physical Therapist         Barriers to Rehab: Include significant or possible arthritic/degenerative changes that have occurred within the joints/spine, The patient's obesity, The patient's generally deconditioned state.     Safety Issues: None noted.          PT Assessment/Plan     Row Name 05/08/18 1500          PT Assessment    Functional Limitations Limitation in home management;Limitations in community activities;Performance in leisure activities;Performance in self-care ADL  -     Impairments Balance;Endurance;Impaired muscle endurance;Impaired muscle power;Pain;Posture;Impaired flexibility;Impaired postural alignment;Poor body mechanics;Range of motion;Joint integrity;Joint mobility  -     Rehab Potential Fair  -     Patient/caregiver participated in establishment of treatment plan and goals Yes  -     Patient would benefit from skilled therapy intervention Yes  -        PT Plan    PT Frequency 2x/week  -     Predicted Duration of Therapy Intervention (OT Eval) 3-5 weeks, 6-10 visits  -     Planned CPT's? PT EVAL MOD COMPLELITY: 77031;PT RE-EVAL: 59083;PT THER PROC EA 15 MIN: 54645;PT THER ACT EA 15 MIN: 54102;PT MANUAL THERAPY EA 15 MIN: 84282;PT ELECTRICAL STIM UNATTEND: ;PT THER SUPP EA 15 MIN  -     Physical Therapy  "Interventions (Optional Details) gross motor skills;home exercise program;joint mobilization;lumbar stabilization;manual therapy techniques;modalities;patient/family education;postural re-education;ROM (Range of Motion);stair training;strengthening;stretching  -     PT Plan Comments Add SCM S next session and possibly doorway S.  -AJ       User Key  (r) = Recorded By, (t) = Taken By, (c) = Cosigned By    Initials Name Provider Type    RIAZ Wells, DEVIN Physical Therapist       Other therapeutic activities and/or exercises will be prescribed depending on the patients progress or lack there of.          Modalities     Row Name 05/08/18 1500             Moist Heat    MH Applied Yes  -AJ      Location c-spine  -AJ      Rx Minutes 15 mins  -      MH S/P Rx Yes  -AJ        User Key  (r) = Recorded By, (t) = Taken By, (c) = Cosigned By    Initials Name Provider Type    RIAZ Wells, PT Physical Therapist              Exercises     Row Name 05/08/18 1500             Subjective Comments    Subjective Comments patient wants to get the pain to go away and feel better..  -         Subjective Pain    Able to rate subjective pain? yes  -AJ      Pre-Treatment Pain Level 1  -      Post-Treatment Pain Level 0  -         Exercise 1    Exercise Name 1 Pro II UE  -      Time 1 10 minutes  -      Additional Comments L 2.0  -         Exercise 2    Exercise Name 2 B UT S  -AJ      Reps 2 2  -AJ      Time 2 30 seconds  -AJ         Exercise 3    Exercise Name 3 B LS S  -AJ      Reps 3 2  -AJ      Time 3 30 seconds  -AJ         Exercise 4    Exercise Name 4 Posterior shoulder rolls between exercises  -      Reps 4 10  -AJ         Exercise 5    Exercise Name 5 Elephant S  -AJ      Reps 5 2  -AJ      Time 5 30 seconds  -AJ         Exercise 6    Exercise Name 6 Chin Tucks  -      Sets 6 2  -AJ      Reps 6 10  -AJ      Time 6 5\" hold  -AJ        User Key  (r) = Recorded By, (t) = Taken By, (c) = Cosigned " By    Initials Name Provider Type     Paula Ahumada, PTA Physical Therapy Assistant    AJ Edna Wells, PT Physical Therapist                        Outcome Measure Options: Neck Disability Index (NDI)  Neck Disability Index  Section 1 - Pain Intensity: The pain is moderate at the moment.  Section 2 - Personal Care: I can look after myself normally without causing extra pain.  Section 3 - Lifting: I can lift only very light weights.  Section 4 - Work: I can do as much work as I want.  Section 5 - Headaches: I have no headaches at all.  Section 6 - Concentration: I can concentrate fully without difficulty.  Section 7 - Sleeping: I have no trouble sleeping.  Section 8 - Driving: I can drive as long as I want with slight neck pain.  Section 9 - Reading: I can read as much as I want with no neck pain.  Section 10 - Recreation: I have some neck pain with all recreational activities.  Neck Disability Index Score: 8  Neck Disability Index Comments: 16%      Time Calculation:   Start Time: 1502  Stop Time: 1618  Time Calculation (min): 76 min  PT Non-Billable Time (min): 15 min  Total Timed Code Minutes- PT: 23 minute(s)     Therapy Charges for Today     Code Description Service Date Service Provider Modifiers Qty    74354586724 HC PT CHNG MAIN POS CURRENT 5/8/2018 Edna Wells, PT GP, CK 1    09443079801 HC PT CHNG MAIN POS PROJECTED 5/8/2018 Edna Wells, PT GP, CJ 1    22677504461 HC PT EVAL MOD COMPLEXITY 3 5/8/2018 Edna Wells PT GP 1    24405565955 HC PT THER PROC EA 15 MIN 5/8/2018 Edna Wells PT GP 2    33988502917 HC PT THER SUPP EA 15 MIN 5/8/2018 Edna Wells PT GP 1          PT G-Codes  Outcome Measure Options: Neck Disability Index (NDI)  Functional Limitation: Changing and maintaining body position  Changing and Maintaining Body Position Current Status (): At least 40 percent but less than 60 percent impaired, limited or restricted  Changing and  Maintaining Body Position Goal Status (): At least 20 percent but less than 40 percent impaired, limited or restricted         Edna Wells, PT, DPT, CSCS  5/8/2018

## 2018-05-11 ENCOUNTER — HOSPITAL ENCOUNTER (OUTPATIENT)
Dept: PHYSICAL THERAPY | Facility: HOSPITAL | Age: 73
Setting detail: THERAPIES SERIES
Discharge: HOME | End: 2018-05-11

## 2018-05-11 DIAGNOSIS — M40.03 POSTURAL KYPHOSIS OF CERVICOTHORACIC REGION: ICD-10-CM

## 2018-05-11 DIAGNOSIS — M54.2 MUSCULOSKELETAL NECK PAIN: Primary | ICD-10-CM

## 2018-05-11 PROCEDURE — 97110 THERAPEUTIC EXERCISES: CPT | Performed by: PHYSICAL THERAPIST

## 2018-05-11 NOTE — THERAPY TREATMENT NOTE
Outpatient Physical Therapy Ortho Treatment Note  [facility]     Patient Name: Sophia Neff  : 1945  MRN: [id number]  Today's Date: 2018      Visit Date: 2018  Pt reports 9/10 pain pre treatment, 5/10 pain post treatment  Reports 0% of improvement.  Attended 2/2 visits.  Insurance available: medicare guidelines  Next MD appt: KANE .  Recertification: 2018.     Visit Dx:    ICD-10-CM ICD-9-CM   1. Musculoskeletal neck pain M54.2 723.1   2. Postural kyphosis of cervicothoracic region M40.03 737.10       There is no problem list on file for this patient.       Past Medical History:   Diagnosis Date   • Anemia    • Arthritis    • Diabetes mellitus    • Fibromyalgia    • High cholesterol    • Hypertension    • Plantar fasciitis     Bilateral        Past Surgical History:   Procedure Laterality Date   • APPENDECTOMY  1970   • BREAST SURGERY Bilateral 1992    Reduction   • GALLBLADDER SURGERY  2004   • HYSTERECTOMY     • NECK SURGERY  1989   • PELVIC FLOOR REPAIR  2009   • PELVIC FLOOR REPAIR  2009             PT Ortho     Row Name 18 1500       Subjective Comments    Subjective Comments patient wants to get the pain to go away and feel better..  -       Subjective Pain    Able to rate subjective pain? yes  -    Pre-Treatment Pain Level 1  -    Post-Treatment Pain Level 0  -       Posture/Observations    Alignment Options Forward head;Cervical lordosis;Thoracic kyphosis;Rounded shoulders;Lumbar lordosis  -    Forward Head Mild;Moderate;Increased  -AJ    Cervical Lordosis Mild;Moderate;Increased  -AJ    Thoracic Kyphosis Mild;Moderate;Increased  -    Rounded Shoulders Bilateral:;Mild;Increased  -    Lumbar lordosis Normal  -    Posture/Observations Comments No acute distress, good overall postural awareness.  -       Cervical/Thoracic Special Tests    Spurlings (Foraminal Compression) Negative  -     "Cervical Compression (Forarminal Compression vs. Facet Pain) Bilateral:;Negative  -AJ    Cervical Distraction (Foraminal Compression vs. Facet Pain) Bilateral:;Negative  -AJ    Transverse Ligament (Instability) Negative  -AJ    Vertebral Artery Test (VBI Sign) Bilateral:;Negative  -AJ       Juan José's Signs    Superficial and non-anatomical tenderness Positive  -AJ    Simulation test Negative  -AJ    Distraction straight leg raise test (sitting vs supine) Negative  -AJ    Regional disturbances Positive  -AJ    Overreaction to examination Positive  -AJ       Head/Neck/Trunk    Neck Extension AROM 35°  -AJ    Neck Flexion AROM 25°  -AJ    Neck Lt Lateral Flexion AROM 15°  -AJ    Neck Rt Lateral Flexion AROM 20°  -AJ    Neck Lt Rotation AROM 40°  -AJ    Neck Rt Rotation AROM 40°  -AJ       MMT (Manual Muscle Testing)    Additional Documentation General Assessment (Manual Muscle Testing) (Group)  -AJ       General Assessment (Manual Muscle Testing)    Comment, General Manual Muscle Testing (MMT) Assessment B UE 5/5, no change in neck pain.. Neck 4-/5 all guarded with pain complaints.  -AJ       Sensation    Sensation WNL? WFL  -AJ    Light Touch No apparent deficits  -AJ    Additional Comments Patient c/o \"fibro pain\" and sensitivity  -AJ       Upper Extremity Flexibility    SCM Bilateral:;Mildly limited  -AJ    Upper Trapezius Bilateral:;Mildly limited;Moderately limited  -AJ    Levator Scapula Bilateral:;Mildly limited;Moderately limited  -AJ       Pathomechanics    Spine Pathomechanics Hinges into extension in lower cervical;Limited upper thoracic motion with cervical ROM  -AJ       Transfers    Comment (Transfers) I with all transfers  -AJ       Gait/Stairs Assessment/Training    Comment (Gait/Stairs) FWB, non-antalgic gait, SC in UE, normal arm swing with gait otherie. No use of AD [facility] and normal arm swing with gait.  -AJ      User Key  (r) = Recorded By, (t) = Taken By, (c) = Cosigned By    Initials Name " "Provider Type     Paula Ahumada, PTA Physical Therapy Assistant    RIAZ Wells, PT Physical Therapist                            PT Assessment/Plan     Row Name 05/11/18 1400          PT Assessment    Assessment Comments pt limited by right sided UT and scalene region pain, reduced neck pain with moist heat after the session.  -BS        PT Plan    PT Frequency 2x/week  -BS     PT Plan Comments add SCM stretch and doorway stretch next session.  -BS       User Key  (r) = Recorded By, (t) = Taken By, (c) = Cosigned By    Initials Name Provider Type     Julio Kohler, PT Physical Therapist                Modalities     Row Name 05/11/18 1300 05/11/18 0800          Moist Heat    MH Applied Yes  -BS Yes  -BS     Location c-spine  -BS c-spine  -BS     Rx Minutes 15 mins  -BS 15 mins  -BS     MH S/P Rx Yes  -BS Yes  -BS       User Key  (r) = Recorded By, (t) = Taken By, (c) = Cosigned By    Initials Name Provider Type     Julio Kohler, PT Physical Therapist                Exercises     Row Name 05/11/18 0800             Subjective Comments    Subjective Comments pr reports severe R sided neck pain this morning  -BS         Subjective Pain    Able to rate subjective pain? yes  -BS      Pre-Treatment Pain Level 9  -BS      Post-Treatment Pain Level 5  -BS         Exercise 1    Exercise Name 1 Pro II UE  -BS      Time 1 10 minutes  -BS      Additional Comments L 5.0  -BS         Exercise 2    Exercise Name 2 B UT S  -BS      Reps 2 2  -BS      Time 2 30 seconds  -BS         Exercise 3    Exercise Name 3 B LS S  -BS      Reps 3 2  -BS      Time 3 30 seconds  -BS         Exercise 4    Exercise Name 4 Posterior shoulder rolls between exercises  -BS      Reps 4 10  -BS         Exercise 5    Exercise Name 5 Elephant S  -BS      Reps 5 2  -BS      Time 5 30 seconds  -BS         Exercise 6    Exercise Name 6 Chin Tucks  -BS      Sets 6 1  -BS      Reps 6 10  -BS      Time 6 5\" hold  -BS         Exercise 7    " "Exercise Name 7 supine cervical retractions  -BS      Sets 7 1  -BS      Reps 7 10  -BS      Time 7 5\" hold  -BS        User Key  (r) = Recorded By, (t) = Taken By, (c) = Cosigned By    Initials Name Provider Type    MICHAEL Kohler, PT Physical Therapist                               PT OP Goals     Row Name 05/11/18 0845 05/11/18 0800       PT Short Term Goals    STG Date to Achieve 05/29/18  -BS  --    STG 1 I with HEP and have additions/changes by next recertification.  -BS  --    STG 1 Progress Ongoing  -BS  --    STG 2 AROm B cervical SB >= 30°  -BS  --    STG 2 Progress Ongoing  -BS  --    STG 3 AROm B cervical ROT >= 55°.  -BS  --    STG 3 Progress Ongoing  -BS  --    STG 4 AROm cervical flex/ext >= 40° each  -BS  --    STG 4 Progress Ongoing  -BS  --    STG 5 C-spine 4/5 or greater in all directions.  -BS  --       Long Term Goals    LTG 1 AROM for cervical spine all WNL, no increase in pain.  -BS  --    LTG 1 Progress Ongoing  -BS  --    LTG 2 C-spine 4+/5 or greater in all directions.  -BS  --    LTG 2 Progress Ongoing  -BS  --    LTG 3 Patient to be more aware of posture and posture correction technique..  -BS  --    LTG 3 Progress Ongoing  -BS  --    LTG 4 I with final HEP.  -BS  --    LTG 4 Progress Ongoing  -BS  --    LTG 5 D/C with free 30 day fitness formula membership.  -BS  --       Time Calculation    PT Goal Re-Cert Due Date --  -BS 05/29/18  -BS      User Key  (r) = Recorded By, (t) = Taken By, (c) = Cosigned By    Initials Name Provider Type    MICHAEL Kohler, PT Physical Therapist          Therapy Education  Given: HEP, Symptoms/condition management, Posture/body mechanics, Pain management  Program: Reinforced  How Provided: Verbal, Demonstration, Written  Provided to: Patient  Level of Understanding: Verbalized, Demonstrated              Time Calculation:   Start Time: 0848  Stop Time: 0930  Time Calculation (min): 42 min  PT Non-Billable Time (min): 15 min  Total Timed Code Minutes- PT: " 27 minute(s)    Therapy Charges for Today     Code Description Service Date Service Provider Modifiers Qty    02778971533  PT THER PROC EA 15 MIN 5/11/2018 Julio Kohler, PT GP 2    [id number]  PT THER SUPP EA 15 MIN 5/11/2018 Julio Kohler, PT GP 1                    Julio Kohler, PT  5/11/2018

## 2018-05-15 ENCOUNTER — HOSPITAL ENCOUNTER (OUTPATIENT)
Dept: PHYSICAL THERAPY | Facility: HOSPITAL | Age: 73
Setting detail: THERAPIES SERIES
Discharge: HOME | End: 2018-05-15

## 2018-05-15 DIAGNOSIS — M40.03 POSTURAL KYPHOSIS OF CERVICOTHORACIC REGION: ICD-10-CM

## 2018-05-15 DIAGNOSIS — M54.2 MUSCULOSKELETAL NECK PAIN: Primary | ICD-10-CM

## 2018-05-15 PROCEDURE — 97110 THERAPEUTIC EXERCISES: CPT | Performed by: PHYSICAL THERAPIST

## 2018-05-15 NOTE — THERAPY TREATMENT NOTE
"    Outpatient Physical Therapy Ortho Treatment Note  [facility]  Edna Wells, PT, DPT, CSCS       Patient Name: Sophia Neff  : 1945  MRN: [id number]  Today's Date: 5/15/2018      Visit Date: 05/15/2018     Pt reports 0/10 pain pre treatment, 0/10 pain post treatment  Reports 0% of improvement.  Attended 3/3 visits.  Insurance available: medicare guidelines  Next MD appt: KANE .  Recertification: 2018.    Visit Dx:    ICD-10-CM ICD-9-CM   1. Musculoskeletal neck pain M54.2 723.1   2. Postural kyphosis of cervicothoracic region M40.03 737.10          Past Medical History:   Diagnosis Date   • Anemia    • Arthritis    • Diabetes mellitus    • Fibromyalgia    • High cholesterol    • Hypertension    • Plantar fasciitis     Bilateral        Past Surgical History:   Procedure Laterality Date   • APPENDECTOMY  1970   • BREAST SURGERY Bilateral 1992    Reduction   • GALLBLADDER SURGERY  2004   • HYSTERECTOMY     • NECK SURGERY  1989   • PELVIC FLOOR REPAIR  2009   • PELVIC FLOOR REPAIR  2009             PT Ortho     Row Name 05/15/18 0800       Subjective Pain    Able to rate subjective pain? yes  -    Pre-Treatment Pain Level 0  -    Post-Treatment Pain Level 0   \"stiff\"  -       Posture/Observations    Posture/Observations Comments No distress.  -      User Key  (r) = Recorded By, (t) = Taken By, (c) = Cosigned By    Initials Name Provider Type     Edna Wells PT Physical Therapist                            PT Assessment/Plan     Row Name 05/15/18 0900          PT Assessment    Assessment Comments Patient appears very comfortable today on MH, falling asleep, Has complaints with all ther ex, but able to completel with cues to not push into pain.  -        PT Plan    PT Frequency 2x/week  -     PT Plan Comments Add TBnand scap stab next session.  -       User Key  (r) = Recorded By, (t) = Taken By, (c) " "= Cosigned By    Initials Name Provider Type    AJ Edna Wells PT Physical Therapist                Modalities     Row Name 05/15/18 0800             Moist Heat    MH Applied Yes  -AJ      Location c-spine  -AJ      Rx Minutes 15 mins  -AJ      MH S/P Rx Yes  -AJ        User Key  (r) = Recorded By, (t) = Taken By, (c) = Cosigned By    Initials Name Provider Type    AJ Edna Wells PT Physical Therapist                Exercises     Row Name 05/15/18 0800             Subjective Comments    Subjective Comments Patient reports she has no pain if she doesn't move at all. Shr reports if she moves it is a 8/10.  -AJ         Subjective Pain    Able to rate subjective pain? yes  -AJ      Pre-Treatment Pain Level 0  -AJ      Post-Treatment Pain Level 0   \"stiff\"  -AJ         Exercise 1    Exercise Name 1 Pro II UR F/R  -AJ      Time 1 10 minutes  -AJ      Additional Comments L 5.0  -AJ         Exercise 2    Exercise Name 2 B UT S  -AJ      Reps 2 2  -AJ      Time 2 30 seconds  -AJ         Exercise 3    Exercise Name 3 B LS S  -AJ      Reps 3 2  -AJ      Time 3 30 seconds  -AJ         Exercise 4    Exercise Name 4 Posterior shoulder rolls between exercises  -AJ      Reps 4 10  -AJ         Exercise 5    Exercise Name 5 B SCM S  -AJ      Reps 5 2  -AJ      Time 5 30 seconds  -AJ         Exercise 6    Exercise Name 6 Doorway S  -AJ      Reps 6 2  -AJ      Time 6 30 seconds  -AJ         Exercise 7    Exercise Name 7 Chin Tucks  -AJ      Sets 7 2  -AJ      Reps 7 10  -AJ      Time 7 5\" hold  -AJ         Exercise 8    Exercise Name 8 Alt cervical SB isometrics  -AJ      Reps 8 10  -AJ      Time 8 5\" hold  -AJ         Exercise 9    Exercise Name 9 Semi-reclined thoracic towel S  -AJ      Time 9 5 minutes  -AJ        User Key  (r) = Recorded By, (t) = Taken By, (c) = Cosigned By    Initials Name Provider Type    AJ Edna Wells, PT Physical Therapist                               PT OP Goals     Row Name " 05/15/18 0900          PT Short Term Goals    STG Date to Achieve 05/29/18  -     STG 1 I with HEP and have additions/changes by next recertification.  -     STG 1 Progress Ongoing;Partially Met  -     STG 2 AROm B cervical SB >= 30°  -     STG 2 Progress Ongoing  -     STG 3 AROm B cervical ROT >= 55°.  -AJ     STG 3 Progress Ongoing  -     STG 4 AROm cervical flex/ext >= 40° each  -     STG 4 Progress Ongoing  -     STG 5 C-spine 4/5 or greater in all directions.  -     STG 5 Progress Ongoing  -        Long Term Goals    LTG 1 AROM for cervical spine all WNL, no increase in pain.  -     LTG 1 Progress Ongoing  -     LTG 2 C-spine 4+/5 or greater in all directions.  -     LTG 2 Progress Ongoing  -     LTG 3 Patient to be more aware of posture and posture correction technique..  -     LTG 3 Progress Ongoing  -     LTG 4 I with final HEP.  -     LTG 4 Progress Ongoing  -     LTG 5 D/C with free 30 day fitness formula membership.  -        Time Calculation    PT Goal Re-Cert Due Date 05/29/18  -       User Key  (r) = Recorded By, (t) = Taken By, (c) = Cosigned By    Initials Name Provider Type    RIAZ Wells PT Physical Therapist          Therapy Education  Given: HEP, Symptoms/condition management, Pain management  Program: Reinforced  How Provided: Verbal, Demonstration  Provided to: Patient  Level of Understanding: Verbalized, Demonstrated              Time Calculation:   Start Time: 0845  Stop Time: 0947  Time Calculation (min): 62 min  PT Non-Billable Time (min): 15 min  Total Timed Code Minutes- PT: 47 minute(s)    Therapy Charges for Today     Code Description Service Date Service Provider Modifiers Qty    28600967282 HC PT THER PROC EA 15 MIN 5/15/2018 Edna Wells PT GP 3    71785065447 HC PT THER SUPP EA 15 MIN 5/15/2018 Edna Wells PT GP 1                    Edna Wells PT, DPT, CSCS  5/15/2018

## 2018-05-17 ENCOUNTER — HOSPITAL ENCOUNTER (OUTPATIENT)
Dept: PHYSICAL THERAPY | Facility: HOSPITAL | Age: 73
Setting detail: THERAPIES SERIES
Discharge: HOME | End: 2018-05-17

## 2018-05-17 DIAGNOSIS — M54.2 MUSCULOSKELETAL NECK PAIN: Primary | ICD-10-CM

## 2018-05-17 DIAGNOSIS — M40.03 POSTURAL KYPHOSIS OF CERVICOTHORACIC REGION: ICD-10-CM

## 2018-05-17 PROCEDURE — 97140 MANUAL THERAPY 1/> REGIONS: CPT | Performed by: PHYSICAL THERAPIST

## 2018-05-17 PROCEDURE — 97110 THERAPEUTIC EXERCISES: CPT | Performed by: PHYSICAL THERAPIST

## 2018-05-17 NOTE — THERAPY TREATMENT NOTE
"    Outpatient Physical Therapy Ortho Treatment Note  [facility]  Edna Wells, PT, DPT, CSCS       Patient Name: Sophia Neff  : 1945  MRN: [id number]  Today's Date: 2018      Visit Date: 2018     Pt reports 5/10 pain pre treatment, 0/10 pain post treatment  Reports 0% of improvement.  Attended 4/4 visits.  Insurance available: medicare guidelines  Next MD appt: KANE .  Recertification: 2018.    Visit Dx:    ICD-10-CM ICD-9-CM   1. Musculoskeletal neck pain M54.2 723.1   2. Postural kyphosis of cervicothoracic region M40.03 737.10          Past Medical History:   Diagnosis Date   • Anemia    • Arthritis    • Diabetes mellitus    • Fibromyalgia    • High cholesterol    • Hypertension    • Plantar fasciitis     Bilateral        Past Surgical History:   Procedure Laterality Date   • APPENDECTOMY  1970   • BREAST SURGERY Bilateral 1992    Reduction   • GALLBLADDER SURGERY  2004   • HYSTERECTOMY     • NECK SURGERY  1989   • PELVIC FLOOR REPAIR  2009   • PELVIC FLOOR REPAIR  2009             PT Ortho     Row Name 18 0900       Subjective Comments    Subjective Comments Patient reports the L side is really bothering her today. SHe reports it woke her up this morning and she couldn't hardly more. She rpeorts as ,long as she doesn't move it is just sore and not painful.  -       Subjective Pain    Able to rate subjective pain? yes  -    Pre-Treatment Pain Level 6  -    Post-Treatment Pain Level 0   \"sore\"  -       Posture/Observations    Posture/Observations Comments No distress, guarded c-spine turning whole body instead of neck.  -    Row Name 05/15/18 0800       Subjective Pain    Able to rate subjective pain? yes  -    Pre-Treatment Pain Level 0  -    Post-Treatment Pain Level 0   \"stiff\"  -       Posture/Observations    Posture/Observations Comments No distress.  -      User Key  (r) = " "Recorded By, (t) = Taken By, (c) = Cosigned By    Initials Name Provider Type     Edna Wells, PT Physical Therapist                            PT Assessment/Plan     Row Name 05/17/18 0900          PT Assessment    Assessment Comments Patient very guarded of c-spine today and reported pain in muscular region of UT/LS. Less pain post treatment.  -AJ        PT Plan    PT Frequency 2x/week  -     PT Plan Comments Attempt upright tband scap/stab.  -AJ       User Key  (r) = Recorded By, (t) = Taken By, (c) = Cosigned By    Initials Name Provider Type    RIAZ Wells, PT Physical Therapist                Modalities     Row Name 05/17/18 0900             Moist Heat    MH Applied Yes  -      Location c-spine  -AJ      Rx Minutes 15 mins  -AJ      MH S/P Rx Yes  -AJ        User Key  (r) = Recorded By, (t) = Taken By, (c) = Cosigned By    Initials Name Provider Type    RIAZ Wells, PT Physical Therapist                Exercises     Row Name 05/17/18 0900             Subjective Comments    Subjective Comments Patient reports the L side is really bothering her today. SHe reports it woke her up this morning and she couldn't hardly more. She rpeorts as ,long as she doesn't move it is just sore and not painful.  -         Subjective Pain    Able to rate subjective pain? yes  -      Pre-Treatment Pain Level 6  -      Post-Treatment Pain Level 0   \"sore\"  -         Exercise 1    Exercise Name 1 Pro II UR F/R  -AJ      Time 1 10 minutes  -AJ      Additional Comments L 5.0  -         Exercise 2    Exercise Name 2 Manual- see manual section  -AJ      Time 2 15 minutes  -AJ         Exercise 3    Exercise Name 3 B UT S- semireclined  -AJ      Reps 3 2  -AJ      Time 3 30 seconds  -AJ         Exercise 4    Exercise Name 4 L LS S- semireclined  -AJ      Reps 4 3  -AJ      Time 4 30 seconds  -         Exercise 5    Exercise Name 5 AROM B cervical ROT- semireclined  -      Reps 5 10 each " "side  -         Exercise 6    Exercise Name 6 Chin tucks- semireclined  -      Sets 6 2  -      Reps 6 10  -      Time 6 5\" hold  -         Exercise 7    Exercise Name 7 RTB CH pulls- semireclined  -      Sets 7 2  -      Reps 7 10  -AJ        User Key  (r) = Recorded By, (t) = Taken By, (c) = Cosigned By    Initials Name Provider Type    RIAZ Wells, PT Physical Therapist                        Manual Rx (last 36 hours)      Manual Treatments     Row Name 05/17/18 0900             Manual Rx 1    Manual Rx 1 Location L UT/LS  -      Manual Rx 1 Type Cuppine and MFR with 4cmcup  -      Manual Rx 1 Duration 15 minutes  -        User Key  (r) = Recorded By, (t) = Taken By, (c) = Cosigned By    Initials Name Provider Type    RIAZ Wells, PT Physical Therapist                PT OP Goals     Row Name 05/17/18 0900          PT Short Term Goals    STG Date to Achieve 05/29/18  -     STG 1 I with HEP and have additions/changes by next recertification.  -     STG 1 Progress Ongoing;Partially Met  -     STG 2 AROm B cervical SB >= 30°  -     STG 2 Progress Ongoing  -     STG 3 AROm B cervical ROT >= 55°.  -     STG 3 Progress Ongoing  -     STG 4 AROm cervical flex/ext >= 40° each  -     STG 4 Progress Ongoing  -     STG 5 C-spine 4/5 or greater in all directions.  -     STG 5 Progress Ongoing  -        Long Term Goals    LTG 1 AROM for cervical spine all WNL, no increase in pain.  -     LTG 1 Progress Ongoing  -     LTG 2 C-spine 4+/5 or greater in all directions.  -     LTG 2 Progress Ongoing  -     LTG 3 Patient to be more aware of posture and posture correction technique..  -     LTG 3 Progress Ongoing  -     LTG 4 I with final HEP.  -     LTG 4 Progress Ongoing  -     LTG 5 D/C with free 30 day fitness formula membership.  -        Time Calculation    PT Goal Re-Cert Due Date 05/29/18  -       User Key  (r) = Recorded By, (t) = Taken By, " (c) = Cosigned By    Initials Name Provider Type    RIAZ Wells PT Physical Therapist          Therapy Education  Given: HEP, Symptoms/condition management, Pain management  Program: Reinforced  How Provided: Verbal, Demonstration  Provided to: Patient  Level of Understanding: Verbalized, Demonstrated              Time Calculation:   Start Time: 0845  Stop Time: 0946  Time Calculation (min): 61 min  PT Non-Billable Time (min): 15 min  Total Timed Code Minutes- PT: 46 minute(s)    Therapy Charges for Today     Code Description Service Date Service Provider Modifiers Qty    93519831128 HC PT THER PROC EA 15 MIN 5/17/2018 Edna Wells, PT GP 2    65772508281 HC PT MANUAL THERAPY EA 15 MIN 5/17/2018 Edna Wells PT GP 1    [id number]  PT THER SUPP EA 15 MIN 5/17/2018 Edna Wells, PT GP 1                    Edna Wells PT, DPT, CSCS  5/17/2018

## 2018-05-22 ENCOUNTER — HOSPITAL ENCOUNTER (OUTPATIENT)
Dept: PHYSICAL THERAPY | Facility: HOSPITAL | Age: 73
Setting detail: THERAPIES SERIES
Discharge: HOME | End: 2018-05-22

## 2018-05-22 DIAGNOSIS — M54.2 MUSCULOSKELETAL NECK PAIN: Primary | ICD-10-CM

## 2018-05-22 DIAGNOSIS — M40.03 POSTURAL KYPHOSIS OF CERVICOTHORACIC REGION: ICD-10-CM

## 2018-05-22 PROCEDURE — 97110 THERAPEUTIC EXERCISES: CPT | Performed by: PHYSICAL THERAPIST

## 2018-05-22 NOTE — THERAPY TREATMENT NOTE
"    Outpatient Physical Therapy Ortho Treatment Note  [facility]  Edna Wells, PT, DPT, CSCS       Patient Name: Sophia Neff  : 1945  MRN: [id number]  Today's Date: 2018      Visit Date: 2018     Pt reports 0/10 pain pre treatment, 0/10 pain post treatment  Reports \"I don't know% of improvement.  Attended 5/5 visits.  Insurance available: medicare guidelines  Next MD appt: [email or web address] .  Recertification: 2018.    Visit Dx:    ICD-10-CM ICD-9-CM   1. Musculoskeletal neck pain M54.2 723.1   2. Postural kyphosis of cervicothoracic region M40.03 737.10          Past Medical History:   Diagnosis Date   • Anemia    • Arthritis    • Diabetes mellitus    • Fibromyalgia    • High cholesterol    • Hypertension    • Plantar fasciitis     Bilateral        Past Surgical History:   Procedure Laterality Date   • APPENDECTOMY  1970   • BREAST SURGERY Bilateral 1992    Reduction   • GALLBLADDER SURGERY  2004   • HYSTERECTOMY     • NECK SURGERY  1989   • PELVIC FLOOR REPAIR  2009   • PELVIC FLOOR REPAIR  2009             PT Ortho     Row Name 18 0900       Subjective Pain    Able to rate subjective pain? yes  -    Pre-Treatment Pain Level 0   \"Just sore\"  -       Posture/Observations    Posture/Observations Comments No distress, no guarding of any kind.  -       Head/Neck/Trunk    Neck Extension AROM 50°  -AJ    Neck Flexion AROM 51°  -      User Key  (r) = Recorded By, (t) = Taken By, (c) = Cosigned By    Initials Name Provider Type     Edna Wells, PT Physical Therapist                            PT Assessment/Plan     Row Name 18 0900          PT Assessment    Assessment Comments Improved c-spine AROM. No issues with new ther ex. Fatigued quickly with bobble heads.  -        PT Plan    PT Frequency 2x/week  -     PT Plan Comments Add more posture and scap stab next session. try wall angels.  -AJ  " "     User Key  (r) = Recorded By, (t) = Taken By, (c) = Cosigned By    Initials Name Provider Type    AJ Edna Wells, DEVIN Physical Therapist                Modalities     Row Name 05/22/18 0900             Moist Heat    MH Applied Yes  -AJ      Location c-spine  -AJ      Rx Minutes 15 mins  -AJ      MH S/P Rx Yes  -AJ        User Key  (r) = Recorded By, (t) = Taken By, (c) = Cosigned By    Initials Name Provider Type    RIAZ Wells PT Physical Therapist                Exercises     Row Name 05/22/18 0900             Subjective Comments    Subjective Comments Patient reports first thing in the morning and at night are when she has the most pain. She reports moving around during the day she is mainly just sore.  -AJ         Subjective Pain    Able to rate subjective pain? yes  -AJ      Pre-Treatment Pain Level 0   \"Just sore\"  -AJ         Exercise 1    Exercise Name 1 Pro II UR F/R  -AJ      Time 1 10 minutes  -AJ      Additional Comments L 5.0  -AJ         Exercise 2    Exercise Name 2 B UT/LS S  -AJ      Reps 2 2  -AJ      Time 2 30 seconds  -AJ         Exercise 3    Exercise Name 3 Alt SB cervical isometrics  -AJ      Reps 3 10  -AJ      Time 3 5\" hold  -AJ         Exercise 4    Exercise Name 4 Seated RTB CH ulls  -AJ      Sets 4 2  -AJ      Reps 4 10  -AJ         Exercise 5    Exercise Name 5 Seated RTB no $  -AJ      Sets 5 2  -AJ      Reps 5 10  -AJ      Time 5 5\" hold  -AJ         Exercise 6    Exercise Name 6 Seated bobble heads  -AJ      Reps 6 5  -AJ      Additional Comments Each rep done to fatigue  -AJ         Exercise 7    Exercise Name 7 RTB Rows: Lo, Mid  -AJ      Sets 7 2  -AJ      Reps 7 10  -AJ         Exercise 8    Exercise Name 8 RTB B shoulder ext  -AJ      Sets 8 2  -AJ      Reps 8 10  -AJ        User Key  (r) = Recorded By, (t) = Taken By, (c) = Cosigned By    Initials Name Provider Type    RIAZ Wells PT Physical Therapist                               PT OP " Goals     Row Name 05/22/18 0900          PT Short Term Goals    STG Date to Achieve 05/29/18  -     STG 1 I with HEP and have additions/changes by next recertification.  -AJ     STG 1 Progress Ongoing;Partially Met  -     STG 1 Progress Comments Still requires cueing for proper technique.  -AJ     STG 2 AROm B cervical SB >= 30°  -AJ     STG 2 Progress Ongoing  -     STG 3 AROm B cervical ROT >= 55°.  -AJ     STG 3 Progress Ongoing  -     STG 4 AROm cervical flex/ext >= 40° each  -     STG 4 Progress Met  -     STG 5 C-spine 4/5 or greater in all directions.  -     STG 5 Progress Ongoing  -        Long Term Goals    LTG 1 AROM for cervical spine all WNL, no increase in pain.  -     LTG 1 Progress Ongoing  -     LTG 2 C-spine 4+/5 or greater in all directions.  -     LTG 2 Progress Ongoing  -     LTG 3 Patient to be more aware of posture and posture correction technique..  -     LTG 3 Progress Ongoing;Partially Met  -     LTG 4 I with final HEP.  -     LTG 4 Progress Ongoing  -     LTG 5 D/C with free 30 day fitness formula membership.  -        Time Calculation    PT Goal Re-Cert Due Date 05/29/18  -       User Key  (r) = Recorded By, (t) = Taken By, (c) = Cosigned By    Initials Name Provider Type    RIAZ Wells, PT Physical Therapist          Therapy Education  Given: HEP, Symptoms/condition management, Pain management  Program: Reinforced  How Provided: Verbal, Demonstration  Provided to: Patient  Level of Understanding: Verbalized, Demonstrated              Time Calculation:   Start Time: 0854 (Patient arrived late for 8:45 appt today.)  Stop Time: 0954  Time Calculation (min): 60 min  PT Non-Billable Time (min): 15 min  Total Timed Code Minutes- PT: 45 minute(s)    Therapy Charges for Today     Code Description Service Date Service Provider Modifiers Qty    19246528324 HC PT THER PROC EA 15 MIN 5/22/2018 Edna Wells, PT GP 3    54938466779 HC PT THER  SUPP EA 15 MIN 5/22/2018 Edna Wells, PT GP 1                    Edna Wells, PT, DPT, CSCS  5/22/2018

## 2018-05-24 ENCOUNTER — APPOINTMENT (OUTPATIENT)
Dept: PHYSICAL THERAPY | Facility: HOSPITAL | Age: 73
End: 2018-05-24

## 2018-05-29 ENCOUNTER — HOSPITAL ENCOUNTER (OUTPATIENT)
Dept: PHYSICAL THERAPY | Facility: HOSPITAL | Age: 73
Setting detail: THERAPIES SERIES
Discharge: HOME | End: 2018-05-29

## 2018-05-29 DIAGNOSIS — M40.03 POSTURAL KYPHOSIS OF CERVICOTHORACIC REGION: ICD-10-CM

## 2018-05-29 DIAGNOSIS — M54.2 MUSCULOSKELETAL NECK PAIN: Primary | ICD-10-CM

## 2018-05-29 PROCEDURE — 97110 THERAPEUTIC EXERCISES: CPT | Performed by: PHYSICAL THERAPIST

## 2018-05-29 PROCEDURE — G8983 BODY POS D/C STATUS: HCPCS | Performed by: PHYSICAL THERAPIST

## 2018-05-29 PROCEDURE — G8982 BODY POS GOAL STATUS: HCPCS | Performed by: PHYSICAL THERAPIST

## 2018-05-29 NOTE — THERAPY DISCHARGE NOTE
"     Outpatient Physical Therapy Ortho Progress Note/Discharge Summary  [facility]  Edna Wells, PT, DPT, CSCS       Patient Name: Sophia Neff  : 1945  MRN: [id number]  Today's Date: 2018      Visit Date: 2018     Pt reports 0/10 pain pre treatment, 0/10 pain post treatment  Reports 20-25% of improvement.  Attended 6/6 visits.  Insurance available: medicare guidelines  Next MD appt: 2018.  Recertification: N/A    Visit Dx:    ICD-10-CM ICD-9-CM   1. Musculoskeletal neck pain M54.2 723.1   2. Postural kyphosis of cervicothoracic region M40.03 737.10       Number of days off work: N/A    Changes to medications: None noted.    Changes to MD orders: None noted.     Past Medical History:   Diagnosis Date   • Anemia    • Arthritis    • Diabetes mellitus    • Fibromyalgia    • High cholesterol    • Hypertension    • Plantar fasciitis     Bilateral        Past Surgical History:   Procedure Laterality Date   • APPENDECTOMY  1970   • BREAST SURGERY Bilateral 1992    Reduction   • GALLBLADDER SURGERY  2004   • HYSTERECTOMY     • NECK SURGERY  1989   • PELVIC FLOOR REPAIR  2009   • PELVIC FLOOR REPAIR  2009             PT Ortho     Row Name 18 0700       Subjective Comments    Subjective Comments Patient reports that she is just sore and not painful. She reports she can feel like she is moving better.  -AJ       Subjective Pain    Able to rate subjective pain? yes  -AJ    Pre-Treatment Pain Level 0   \"Sore\"  -AJ    Post-Treatment Pain Level 0   \"Arms are tired\"  -AJ       Posture/Observations    Alignment Options Forward head;Cervical lordosis;Thoracic kyphosis;Rounded shoulders;Lumbar lordosis  -AJ    Forward Head Mild;Moderate;Increased  -AJ    Cervical Lordosis Mild;Moderate;Increased  -AJ    Thoracic Kyphosis Mild;Moderate;Increased  -AJ    Rounded Shoulders Bilateral:;Mild;Increased  -AJ    Lumbar lordosis Normal  " -AJ    Posture/Observations Comments No distress, no guarding of any kind.  -AJ       Cervical/Thoracic Special Tests    Spurlings (Foraminal Compression) Negative  -AJ    Cervical Compression (Forarminal Compression vs. Facet Pain) Bilateral:;Negative  -AJ    Cervical Distraction (Foraminal Compression vs. Facet Pain) Bilateral:;Negative  -AJ    Transverse Ligament (Instability) Negative  -AJ    Vertebral Artery Test (VBI Sign) Bilateral:;Negative  -AJ       Juan José's Signs    Superficial and non-anatomical tenderness Positive  -AJ    Simulation test Negative  -AJ    Distraction straight leg raise test (sitting vs supine) Negative  -AJ    Regional disturbances Positive  -AJ    Overreaction to examination Positive  -AJ       Head/Neck/Trunk    Neck Extension AROM 50°  -AJ    Neck Flexion AROM 62°  -AJ    Neck Lt Lateral Flexion AROM 22°  -AJ    Neck Rt Lateral Flexion AROM 22°  -AJ    Neck Lt Rotation AROM 55°  -AJ    Neck Rt Rotation AROM 60°  -AJ       General Assessment (Manual Muscle Testing)    Comment, General Manual Muscle Testing (MMT) Assessment B UE 5/5, no change in pain, c-spine 5/5, jno change in pain.  -AJ       Sensation    Sensation WNL? WFL  -AJ    Light Touch No apparent deficits  -AJ       Upper Extremity Flexibility    SCM Bilateral:;Mildly limited  -AJ    Upper Trapezius Bilateral:;Mildly limited;Moderately limited  -AJ    Levator Scapula Bilateral:;Mildly limited;Moderately limited  -AJ       Pathomechanics    Spine Pathomechanics Hinges into extension in lower cervical;Limited upper thoracic motion with cervical ROM  -AJ       Transfers    Comment (Transfers) I with all transfers.  -AJ       Gait/Stairs Assessment/Training    Comment (Gait/Stairs) FWB, non-antalgic gait, ambulated with a SC into clinic, no AD within clinic, normal arm swing with gait.  -AJ      User Key  (r) = Recorded By, (t) = Taken By, (c) = Cosigned By    Initials Name Provider Type    AJ Edna Wells PT Physical  "Therapist         Barriers to Rehab: Include significant or possible arthritic/degenerative changes that have occurred within the spine, The patient's obesity, The patient's generally deconditioned state, Possible poor/questionable compliance with HEP.     Safety Issues: None noted.          PT Assessment/Plan     Row Name 05/29/18 0700          PT Assessment    Functional Limitations Limitation in home management;Limitations in community activities;Performance in leisure activities;Performance in self-care ADL  -     Impairments Balance;Endurance;Impaired muscle endurance;Impaired muscle power;Pain;Posture;Impaired flexibility;Impaired postural alignment;Poor body mechanics;Range of motion;Joint integrity;Joint mobility  -     Rehab Potential Fair  -AJ     Patient/caregiver participated in establishment of treatment plan and goals Yes  -AJ     Patient would benefit from skilled therapy intervention No  -AJ        PT Plan    PT Frequency --   N/A  -AJ     Predicted Duration of Therapy Intervention (OT Eval) N/A  -AJ     PT Plan Comments D/C today with final HEP and free 30 day fitness formula membership.  -RIAZ       User Key  (r) = Recorded By, (t) = Taken By, (c) = Cosigned By    Initials Name Provider Type    RIAZ Wells, PT Physical Therapist                Modalities     Row Name 05/29/18 0700             Moist Heat    MH S/P Rx No   patient defers.  -RAIZ        User Key  (r) = Recorded By, (t) = Taken By, (c) = Cosigned By    Initials Name Provider Type    RIAZ Wells, PT Physical Therapist                Exercises     Row Name 05/29/18 0700             Subjective Comments    Subjective Comments Patient reports that she is just sore and not painful. She reports she can feel like she is moving better.  -RIAZ         Subjective Pain    Able to rate subjective pain? yes  -RIAZ      Pre-Treatment Pain Level 0   \"Sore\"  -RIAZ      Post-Treatment Pain Level 0   \"Arms are tired\"  -AJ         " "Exercise 1    Exercise Name 1 Pro II UR F/R  -AJ      Time 1 12 minutes  -AJ      Additional Comments L 6.0  -AJ         Exercise 2    Exercise Name 2 B UT/LS S  -AJ      Reps 2 2  -AJ      Time 2 30 seconds  -AJ         Exercise 3    Exercise Name 3 Alt SB cervical isometrics  -AJ      Reps 3 10  -AJ      Time 3 5\" hold  -AJ         Exercise 4    Exercise Name 4 Chin Tucks  -AJ      Sets 4 2  -AJ      Reps 4 10  -AJ      Time 4 5\" hold  -AJ         Exercise 5    Exercise Name 5 St. RTB no $  -AJ      Sets 5 2  -AJ      Reps 5 10  -AJ      Time 5 5\" hold  -AJ         Exercise 6    Exercise Name 6 St. RTB Ch pulls  -AJ      Sets 6 2  -AJ      Reps 6 10  -AJ         Exercise 7    Exercise Name 7 St. RTB Clocks  -AJ      Sets 7 2  -AJ      Reps 7 10  -AJ         Exercise 8    Sets 8 2  -AJ      Reps 8 10  -AJ      Time (Seconds) 8 GTB Rows: lo, Mid  -AJ         Exercise 9    Sets 9 2  -AJ      Reps 9 10  -AJ      Time (Seconds) 9 GTB B shoulder extension  -AJ         Exercise 10    Exercise Name 10 Wall angels  -AJ      Sets 10 2  -AJ      Reps 10 10  -AJ         Exercise 11    Exercise Name 11 Discussion of final HEP.  -AJ        User Key  (r) = Recorded By, (t) = Taken By, (c) = Cosigned By    Initials Name Provider Type    AJ Edna Wells, PT Physical Therapist                               PT OP Goals     Row Name 05/29/18 0700          PT Short Term Goals    STG Date to Achieve 05/29/18  -AJ     STG 1 I with HEP and have additions/changes by next recertification.  -AJ     STG 1 Progress Ongoing;Not Met  -AJ     STG 1 Progress Comments Still requires a lot of cueing for proper technique and recall.. Questrionable HEP compliance.  -AJ     STG 2 AROm B cervical SB >= 30°  -AJ     STG 2 Progress Ongoing;Not Met;Progressing  -AJ     STG 3 AROm B cervical ROT >= 55°.  -AJ     STG 3 Progress Met  -AJ     STG 4 AROm cervical flex/ext >= 40° each  -AJ     STG 4 Progress Met  -AJ     STG 5 C-spine 4/5 or greater in " all directions.  -     STG 5 Progress Met  -AJ        Long Term Goals    LTG 1 AROM for cervical spine all WNL, no increase in pain.  -AJ     LTG 1 Progress Ongoing;Partially Met;Progressing  -     LTG 1 Progress Comments Met all but ROT/SB  -     LTG 2 C-spine 4+/5 or greater in all directions.  -     LTG 2 Progress Met  -     LTG 3 Patient to be more aware of posture and posture correction technique..  -AJ     LTG 3 Progress Ongoing;Partially Met  -     LTG 3 Progress Comments Still requires a lot of cueing.  -     LTG 4 I with final HEP.  -AJ     LTG 4 Progress Ongoing;Partially Met  -     LTG 4 Progress Comments Was given written copy of HEP exercises  -     LTG 5 D/C with free 30 day fitness formula membership.  -     LTG 5 Progress Met  -        Time Calculation    PT Goal Re-Cert Due Date --   N/A  -       User Key  (r) = Recorded By, (t) = Taken By, (c) = Cosigned By    Initials Name Provider Type    RIAZ Wells, PT Physical Therapist          Therapy Education  Given: HEP, Symptoms/condition management, Pain management (POC)  Program: Reinforced  How Provided: Verbal, Demonstration, Written  Provided to: Patient  Level of Understanding: Verbalized, Demonstrated    Outcome Measure Options: Neck Disability Index (NDI)  Neck Disability Index  Section 1 - Pain Intensity: I have no pain at the moment.  Section 2 - Personal Care: I can look after myself normally without causing extra pain.  Section 3 - Lifting: I can lift only very light weights.  Section 4 - Work: I can do as much work as I want.  Section 5 - Headaches: I have no headaches at all.  Section 6 - Concentration: I can concentrate fully without difficulty.  Section 7 - Sleeping: I have no trouble sleeping.  Section 8 - Driving: I can drive as long as I want with moderate neck pain.  Section 9 - Reading: I can read as much as I want with moderate neck pain.  Section 10 - Recreation: I have some neck pain with a few  recreational activities.  Neck Disability Index Score: 10  Neck Disability Index Comments: 20%      Time Calculation:   Start Time: 0730  Stop Time: 0831  Time Calculation (min): 61 min  Total Timed Code Minutes- PT: 61 minute(s)    Therapy Charges for Today     Code Description Service Date Service Provider Modifiers Qty    21855892908 HC PT CHNG MAIN POS PROJECTED 5/29/2018 Edna Wells, PT GP, CH 1    08077283060 HC PT CHNG MAIN POS DISCHARGE 5/29/2018 Edna Wells, PT GP, CI 1    33325620747 HC PT THER PROC EA 15 MIN 5/29/2018 Edna Wells PT GP 4    67622929155 HC PT THER SUPP EA 15 MIN 5/29/2018 Edna Wells, PT GP 1          PT G-Codes  Outcome Measure Options: Neck Disability Index (NDI)  Functional Limitation: Changing and maintaining body position  Changing and Maintaining Body Position Goal Status (): 0 percent impaired, limited or restricted  Changing and Maintaining Body Position Discharge Status (): At least 1 percent but less than 20 percent impaired, limited or restricted     OP PT Discharge Summary  Date of Discharge: 05/29/18  Reason for Discharge: Maximum functional potential achieved, Independent  Outcomes Achieved: Patient able to partially acheive established goals, Refer to plan of care for updates on goals achieved  Discharge Destination: Home with home program  Discharge Instructions/Additional Comments: D/C with final HEP and free 30 day fitness formula membership.      Edna Wells, PT, DPT, CSCS  5/29/2018

## 2018-05-31 ENCOUNTER — APPOINTMENT (OUTPATIENT)
Dept: PHYSICAL THERAPY | Facility: HOSPITAL | Age: 73
End: 2018-05-31

## 2018-10-30 ENCOUNTER — TRANSCRIBE ORDERS (OUTPATIENT)
Dept: PHYSICAL THERAPY | Facility: HOSPITAL | Age: 73
End: 2018-10-30

## 2018-10-30 DIAGNOSIS — M79.7 FIBROMYALGIA: Primary | ICD-10-CM

## 2018-11-13 ENCOUNTER — HOSPITAL ENCOUNTER (OUTPATIENT)
Dept: PHYSICAL THERAPY | Facility: HOSPITAL | Age: 73
Setting detail: THERAPIES SERIES
Discharge: HOME | End: 2018-11-13

## 2018-11-13 DIAGNOSIS — M79.7 FIBROMYALGIA: Primary | ICD-10-CM

## 2018-11-13 PROCEDURE — 97162 PT EVAL MOD COMPLEX 30 MIN: CPT | Performed by: PHYSICAL THERAPIST

## 2018-11-13 PROCEDURE — G8981 BODY POS CURRENT STATUS: HCPCS | Performed by: PHYSICAL THERAPIST

## 2018-11-13 PROCEDURE — 97110 THERAPEUTIC EXERCISES: CPT | Performed by: PHYSICAL THERAPIST

## 2018-11-13 PROCEDURE — G8982 BODY POS GOAL STATUS: HCPCS | Performed by: PHYSICAL THERAPIST

## 2018-11-13 NOTE — THERAPY EVALUATION
Outpatient Physical Therapy Ortho Initial Evaluation  [facility]  Edna Wells, PT, DPT, CSCS       Patient Name: Sophia Neff  : 1945  MRN: [id number]  Today's Date: 2018      Visit Date: 2018    Pt reports 0/10 pain pre treatment, /10 pain post treatment  Reports N/A% of improvement.  Attended  visits.  Insurance available: medicare guidelines/PT only  Next MD appt: 1 month 2018.  Recertification: N/A     Past Medical History:   Diagnosis Date   • Anemia    • Arthritis    • Diabetes mellitus (CMS/HCC)    • Fibromyalgia    • High cholesterol    • Hypertension    • Plantar fasciitis     Bilateral        Past Surgical History:   Procedure Laterality Date   • APPENDECTOMY  1970   • BREAST SURGERY Bilateral 1992    Reduction   • GALLBLADDER SURGERY  2004   • HYSTERECTOMY     • NECK SURGERY  1989   • PELVIC FLOOR REPAIR  2009   • PELVIC FLOOR REPAIR  2009       Visit Dx:     ICD-10-CM ICD-9-CM   1. Fibromyalgia M79.7 729.1     Number of days off work: N/A     Patient is .     Patient has grown children.     Medications:   Glucotrol 10mg  Januvia 50mg  Hydralazine 10mg  Cozaar EQ 25mg  Docusate Sodium 100mg  Pravastatin 40mg  Claratin 10mg  Robasin 500mg  Clacium 600mg with Vit D 125 IU  Multi-vitamin- once a day women's  Tylenol arthritis 650mg (no asprain)  Voltaren topical gel 1%  Triamcinolone Acet 454GM-Top 0.1% oint        Allergies:             Aciphex [Rabeprazole Sodium]   Adalat [Nifedipine]   Amoxicillin   Bextra [Valdecoxib]   Celebrex [Celecoxib]   Codeine   Cortisone   Excedrin Back & [Acetaminophen-aspirin Buffered]   Feldene [Piroxicam]   Lodine [Etodolac]   Maalox [Calcium Carbonate Antacid]   Maxidex [Dexamethasone]   Milk Of Magnesia [Magnesium Hydroxide]   Mobic [Meloxicam]   Morphine And Related   Motrin [Ibuprofen]   Naproxen   Periactin [Cyproheptadine]   Prevacid [Lansoprazole]  "  Prilosec [Omeprazole]   Toradol [Ketorolac Tromethamine]   Valium [Diazepam]   Vioxx [Rofecoxib]   Voltaren [Diclofenac Sodium]     Panfilo as Reviewed Reviewed by You at 11:03 AM.   Iriseofluvin Ultramicrosize  Magcitrate      Patient History     Row Name 11/13/18 0800             History    Chief Complaint  Pain  -AJ      Type of Pain  Other pain abdominal  -AJ      Date Current Problem(s) Began  -- Chronic  -AJ      Brief Description of Current Complaint  Patient is a return patient who has been seen several times in physical therapy. She reports she has seen many specialist with upper and lower GI scopes and concluded that it wasn't internal. She reports she was told it is muscular and is her fibromyalgia. She rpeorts most of her pain is her abdomen and her arms/legs.  -AJ      Previous treatment for THIS PROBLEM  Rehabilitation;Medication  -AJ      Patient/Caregiver Goals  Relieve pain;Know what to do to help the symptoms  -AJ      Current Tobacco Use  None  -AJ      Smoking Status  Former Smoker, quite years ago.  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  Has seen many specialist.  -AJ      What clinical tests have you had for this problem?  CT scan upper and lower GI  -AJ      Results of Clinical Tests  N\"Not internal related\"  -AJ      History of Previous Related Injuries  None  -AJ      Are you or can you be pregnant  No  -AJ         Pain     Pain Location  Abdomen  -AJ      Pain at Present  0  -AJ      Pain at Best  0  -AJ      Pain at Worst  10  -AJ      Pain Frequency  Intermittent  -AJ      Pain Description  Sore  -AJ      What Performance Factors Make the Current Problem(s) WORSE?  \"I don't do well laying flat\"  -AJ      What Performance Factors " "Make the Current Problem(s) BETTER?  stretches, moving  -AJ      What position do you sleep in?  Supine semi-reclined  -AJ      Difficulties at work?  N/A  -AJ      Difficulties with ADL's?  None  -AJ      Difficulties with recreational activities?  working out  -AJ        User Key  (r) = Recorded By, (t) = Taken By, (c) = Cosigned By    Initials Name Provider Type    AJ Edna Wells, DEVIN Physical Therapist          PT Ortho     Row Name 11/13/18 0800       Subjective Comments    Subjective Comments  Patient wishes to get restarted with therapy.  -AJ       Precautions and Contraindications    Precautions/Limitations  no known precautions/limitations  -       Subjective Pain    Able to rate subjective pain?  yes  -AJ    Pre-Treatment Pain Level  0  -AJ    Post-Treatment Pain Level  0  -    Subjective Pain Comment  \"SOre\"  -AJ       Posture/Observations    Alignment Options  Forward head;Cervical lordosis;Thoracic kyphosis;Rounded shoulders;Lumbar lordosis  -AJ    Forward Head  Mild;Moderate;Increased  -AJ    Cervical Lordosis  Mild;Moderate;Increased  -AJ    Thoracic Kyphosis  Mild;Moderate;Increased  -AJ    Rounded Shoulders  Bilateral:;Mild;Increased  -AJ    Lumbar lordosis  Normal  -AJ    Posture/Observations Comments  No distress, no guarding of any kind. Fiar overall postural awareness.  -AJ       Special Tests/Palpation    Special Tests/Palpation  -- Multiple areas TTP, non-apecific, generalized, abdomen, neck  -AJ       General ROM    GENERAL ROM COMMENTS  AROM B UE, neck, LE all WFL. LB also WFL, except ext, listed below.  -AJ       Head/Neck/Trunk    Trunk Extension AROM  18°  -AJ    Trunk Flexion AROM  90°, fingertips to toes  -AJ    Trunk Lt Lateral Flexion AROM  WNL, fingertips ot fibular head  -AJ    Trunk Rt Lateral Flexion AROM  WNL, fingertips ot fibular head  -AJ    Trunk Lt Rotation AROM  WNL, able to turn fully perpendicular to starting point.  -AJ    Trunk Rt Rotation AROM  WNL, able " to turn fully perpendicular to starting point.  -AJ       MMT (Manual Muscle Testing)    General MMT Comments  b UE/LE 5/5.  -AJ       Sensation    Sensation WNL?  WFL  -AJ    Light Touch  No apparent deficits  -AJ    Additional Comments  No areas of decreased sensation to crude ytouch  -AJ       Pathomechanics    Spine Pathomechanics  Hinges into extension in lower cervical;Limited upper thoracic motion with cervical ROM;Bends knees with attempted lumbar extension;Excessive thoracic kyphosis with forward bend  -AJ       Transfers    Comment (Transfers)  I with all transfers, fair log roll technique.  -AJ       Gait/Stairs Assessment/Training    Comment (Gait/Stairs)  FW, non-antalgic gait, brings SC, but does not use it around the clinic, in fact, leaves it in waiting area by accident.  -RIAZ      User Key  (r) = Recorded By, (t) = Taken By, (c) = Cosigned By    Initials Name Provider Type    Edna Salguero, PT Physical Therapist                      Therapy Education  Given: HEP, Symptoms/condition management, Pain management(POC)  Program: New  How Provided: Verbal, Demonstration, Written  Provided to: Patient  Level of Understanding: Verbalized, Demonstrated     PT OP Goals     Row Name 11/13/18 0800          PT Short Term Goals    STG Date to Achieve  11/30/18  -AJ     STG 1  I with land stretching program.  -AJ     STG 2  I with land strengthening program.  -AJ     STG 3  I with aquatic program.  -AJ     STG 4  D/C with a final HEP and free 30 day fitness formula memebership.  -RIAZ        Time Calculation    PT Goal Re-Cert Due Date  -- N/A  -RIAZ       User Key  (r) = Recorded By, (t) = Taken By, (c) = Cosigned By    Initials Name Provider Type    Edna Salguero, PT Physical Therapist         Barriers to Rehab: Include significant or possible arthritic/degenerative changes that have occurred within the joints/spine, The patient's obesity, The patient's generally deconditioned state.     Safety  "Issues: None noted.        PT Assessment/Plan     Row Name 11/13/18 0800          PT Assessment    Functional Limitations  Limitation in home management;Limitations in community activities;Performance in leisure activities;Performance in self-care ADL  -     Impairments  Endurance;Impaired muscle endurance;Impaired muscle power;Pain;Posture;Impaired flexibility;Impaired postural alignment;Poor body mechanics;Joint integrity;Joint mobility  -     Rehab Potential  Good  -     Patient/caregiver participated in establishment of treatment plan and goals  Yes  -     Patient would benefit from skilled therapy intervention  Yes for HEP instruction  -        PT Plan    PT Frequency  2x/week;1x/week 2x/wk for 1 wk, then 1x/wk fot 1 wk  -AJ     Predicted Duration of Therapy Intervention (Therapy Eval)  Over 2 weeks, 3 visits total  -     Planned CPT's?  PT EVAL MOD COMPLELITY: 57260;PT RE-EVAL: 65001;PT THER PROC EA 15 MIN: 57397;PT THER ACT EA 15 MIN: 19796;PT THER SUPP EA 15 MIN  -     Physical Therapy Interventions (Optional Details)  aquatics exercise;gross motor skills;home exercise program;patient/family education;postural re-education;ROM (Range of Motion);strengthening;stretching  -     PT Plan Comments  3 visits total for re-instruction in HEP. 1 visit land for S, 1 visit land for strength, and 1 visit aquatics.  -       User Key  (r) = Recorded By, (t) = Taken By, (c) = Cosigned By    Initials Name Provider Type    Edna Salguero, PT Physical Therapist            Exercises     Row Name 11/13/18 0800             Subjective Comments    Subjective Comments  Patient wishes to get restarted with therapy.  -         Subjective Pain    Able to rate subjective pain?  yes  -AJ      Pre-Treatment Pain Level  0  -AJ      Post-Treatment Pain Level  0  -      Subjective Pain Comment  \"SOre\"  -         Exercise 1    Exercise Name 1  Pro II UE/LE F/R  -      Time 1  10 minutes  -RIAZ      " "Additional Comments  L 4.0  -AJ         Exercise 2    Exercise Name 2  B UT/LS/SCM S  -AJ      Reps 2  2  -AJ      Time 2  30 seconds  -AJ         Exercise 3    Exercise Name 3  Elephant S- 3 way  -AJ      Reps 3  2  -AJ      Time 3  30 seconds  -AJ         Exercise 4    Exercise Name 4  Seated trunk ROT S  -AJ      Reps 4  10  -AJ      Time 4  10\" hold  -AJ         Exercise 5    Exercise Name 5  B sitting piriformis S  -AJ      Reps 5  2  -AJ      Time 5  30 seconds  -AJ         Exercise 6    Exercise Name 6  B St. HS S  -AJ      Reps 6  2  -AJ      Time 6  30 seconds  -AJ         Exercise 7    Exercise Name 7  LTR  -AJ      Reps 7  10  -AJ      Time 7  10\" holds  -AJ      Additional Comments  semireclined  -AJ         Exercise 8    Exercise Name 8  Doorway S  -AJ      Reps 8  2  -AJ      Time 8  30 seconds  -AJ         Exercise 9    Exercise Name 9  St. lumbar extensions  -AJ      Reps 9  10  -AJ      Time 9  5\" hold  -AJ        User Key  (r) = Recorded By, (t) = Taken By, (c) = Cosigned By    Initials Name Provider Type    AJ Edna Wells, PT Physical Therapist                       Time Calculation:   Start Time: 0820  Stop Time: 0923  Time Calculation (min): 63 min  Total Timed Code Minutes- PT: 53 minute(s)     Therapy Charges for Today     Code Description Service Date Service Provider Modifiers Qty    01670024074  PT CHNG MAIN POS CURRENT 11/13/2018 Edna Wells, PT GP, CI 1    70742332698 HC PT CHNG MAIN POS PROJECTED 11/13/2018 Edna Wells, PT GP, CH 1    26311508834 HC PT EVAL MOD COMPLEXITY 1 11/13/2018 Edna Wells, PT GP 1    04674037537 HC PT THER PROC EA 15 MIN 11/13/2018 Edna Wells, PT GP 4    36411947583 HC PT THER SUPP EA 15 MIN 11/13/2018 Edna Wells, PT GP 1          PT G-Codes  Functional Limitation: Changing and maintaining body position  Changing and Maintaining Body Position Current Status (): At least 1 percent but less than " 20 percent impaired, limited or restricted  Changing and Maintaining Body Position Goal Status (): 0 percent impaired, limited or restricted         Edna Wells, PT, DPT, CSCS  11/13/2018

## 2018-11-15 ENCOUNTER — HOSPITAL ENCOUNTER (OUTPATIENT)
Dept: PHYSICAL THERAPY | Facility: HOSPITAL | Age: 73
Setting detail: THERAPIES SERIES
Discharge: HOME | End: 2018-11-15

## 2018-11-15 DIAGNOSIS — M79.7 FIBROMYALGIA: Primary | ICD-10-CM

## 2018-11-15 PROCEDURE — 97110 THERAPEUTIC EXERCISES: CPT | Performed by: PHYSICAL THERAPIST

## 2018-11-15 NOTE — THERAPY TREATMENT NOTE
Outpatient Physical Therapy Ortho Treatment Note  [facility]  Edna Wells, PT, DPT, CSCS       Patient Name: Sophia Neff  : 1945  MRN: [id number]  Today's Date: 11/15/2018      Visit Date: 11/15/2018     Pt reports 0/10 pain pre treatment, /10 pain post treatment  Reports 0% of improvement.  Attended 2/2 visits.  Insurance available: medicare guidelines/PT only  Next MD appt: 1 month .  Recertification: N/A          Visit Dx:    ICD-10-CM ICD-9-CM   1. Fibromyalgia M79.7 729.1            Past Medical History:   Diagnosis Date   • Anemia    • Arthritis    • Diabetes mellitus (CMS/HCC)    • Fibromyalgia    • High cholesterol    • Hypertension    • Plantar fasciitis     Bilateral        Past Surgical History:   Procedure Laterality Date   • APPENDECTOMY  1970   • BREAST SURGERY Bilateral 1992    Reduction   • GALLBLADDER SURGERY  2004   • HYSTERECTOMY     • NECK SURGERY  1989   • PELVIC FLOOR REPAIR  2009   • PELVIC FLOOR REPAIR  2009       PT Ortho     Row Name 11/15/18 1000       Subjective Comments    Subjective Comments  Patient repprts to day is a good day and she has no pain or soreness.  -AJ       Precautions and Contraindications    Precautions/Limitations  no known precautions/limitations  -AJ       Subjective Pain    Able to rate subjective pain?  yes  -AJ    Pre-Treatment Pain Level  0  -AJ    Post-Treatment Pain Level  0  -AJ       Posture/Observations    Posture/Observations Comments  No distress, no guarding of any kind. Fiar overall postural awareness.  -AJ    Row Name 18 0800       Subjective Comments    Subjective Comments  Patient wishes to get restarted with therapy.  -       Precautions and Contraindications    Precautions/Limitations  no known precautions/limitations  -       Subjective Pain    Able to rate subjective pain?  yes  -    Pre-Treatment Pain Level  0  -AJ    Post-Treatment Pain  "Level  0  -AJ    Subjective Pain Comment  \"SOre\"  -AJ       Posture/Observations    Alignment Options  Forward head;Cervical lordosis;Thoracic kyphosis;Rounded shoulders;Lumbar lordosis  -AJ    Forward Head  Mild;Moderate;Increased  -AJ    Cervical Lordosis  Mild;Moderate;Increased  -AJ    Thoracic Kyphosis  Mild;Moderate;Increased  -AJ    Rounded Shoulders  Bilateral:;Mild;Increased  -AJ    Lumbar lordosis  Normal  -AJ    Posture/Observations Comments  No distress, no guarding of any kind. Fiar overall postural awareness.  -AJ       Special Tests/Palpation    Special Tests/Palpation  -- Multiple areas TTP, non-apecific, generalized, abdomen, neck  -AJ       General ROM    GENERAL ROM COMMENTS  AROM B UE, neck, LE all WFL. LB also WFL, except ext, listed below.  -AJ       Head/Neck/Trunk    Trunk Extension AROM  18°  -AJ    Trunk Flexion AROM  90°, fingertips to toes  -AJ    Trunk Lt Lateral Flexion AROM  WNL, fingertips ot fibular head  -AJ    Trunk Rt Lateral Flexion AROM  WNL, fingertips ot fibular head  -AJ    Trunk Lt Rotation AROM  WNL, able to turn fully perpendicular to starting point.  -AJ    Trunk Rt Rotation AROM  WNL, able to turn fully perpendicular to starting point.  -AJ       MMT (Manual Muscle Testing)    General MMT Comments  b UE/LE 5/5.  -AJ       Sensation    Sensation WNL?  WFL  -AJ    Light Touch  No apparent deficits  -AJ    Additional Comments  No areas of decreased sensation to crude ytouch  -       Pathomechanics    Spine Pathomechanics  Hinges into extension in lower cervical;Limited upper thoracic motion with cervical ROM;Bends knees with attempted lumbar extension;Excessive thoracic kyphosis with forward bend  -       Transfers    Comment (Transfers)  I with all transfers, fair log roll technique.  -AJ       Gait/Stairs Assessment/Training    Comment (Gait/Stairs)  FW, non-antalgic gait, brings SC, but does not use it around the clinic, in fact, leaves it in waiting area by " accident.  -AJ      User Key  (r) = Recorded By, (t) = Taken By, (c) = Cosigned By    Initials Name Provider Type    Edna Salguero, PT Physical Therapist                      PT Assessment/Plan     Row Name 11/15/18 1200          PT Assessment    Assessment Comments  I with all aquatics.  -AJ        PT Plan    PT Frequency  2x/week;1x/week  -AJ     PT Plan Comments  D/C at next visit with final land strengthening HEP.  -AJ       User Key  (r) = Recorded By, (t) = Taken By, (c) = Cosigned By    Initials Name Provider Type    Edna Salguero, PT Physical Therapist              Exercises     Row Name 11/15/18 1000             Subjective Comments    Subjective Comments  Patient repprts to day is a good day and she has no pain or soreness.  -AJ         Subjective Pain    Able to rate subjective pain?  yes  -AJ      Pre-Treatment Pain Level  0  -AJ      Post-Treatment Pain Level  0  -AJ         Aquatics    Aquatics performed?  Yes  -AJ         Exercise 1    Exercise Name 1  aqua: amb F/R/L  -AJ      Time 1  5 min each  -AJ         Exercise 2    Exercise Name 2  aqua: wand rot  -AJ      Reps 2  15  -AJ         Exercise 3    Exercise Name 3  aqua: wand paddle wheel F/R  -AJ      Reps 3  15  -AJ         Exercise 4    Exercise Name 4  aqua: YP shoulder 3-way  -AJ      Reps 4  15 each  -AJ         Exercise 5    Exercise Name 5  aqua: YP punches with trunk ROT  -AJ      Sets 5  15 each  -AJ         Exercise 6    Exercise Name 6  aqua: YP trunk SB  -AJ      Reps 6  15 each  -AJ         Exercise 7    Exercise Name 7  aqua: B SLR 3-way  -AJ      Reps 7  15 each  -AJ         Exercise 8    Exercise Name 8  aqua: MS  -AJ      Reps 8  15  -AJ         Exercise 9    Exercise Name 9  aqua: dw scissors  -AJ      Time 9  3 minutes  -AJ         Exercise 10    Exercise Name 10  aqua: dw flutter  -AJ      Time 10  3 minutes  -AJ         Exercise 11    Exercise Name 11  aqua: dw bike  -AJ      Time 11  3 min  -AJ         User Key  (r) = Recorded By, (t) = Taken By, (c) = Cosigned By    Initials Name Provider Type    Edna Salguero, PT Physical Therapist                         PT OP Goals     Row Name 11/15/18 1200          PT Short Term Goals    STG Date to Achieve  11/30/18  -AJ     STG 1  I with land stretching program.  -AJ     STG 1 Progress  Met  -AJ     STG 2  I with land strengthening program.  -AJ     STG 2 Progress  Ongoing  -AJ     STG 3  I with aquatic program.  -AJ     STG 3 Progress  Met  -AJ     STG 4  D/C with a final HEP and free 30 day fitness formula memebership.  -AJ     STG 4 Progress  Ongoing  -AJ        Time Calculation    PT Goal Re-Cert Due Date  -- N/A  -       User Key  (r) = Recorded By, (t) = Taken By, (c) = Cosigned By    Initials Name Provider Type    Edna Salguero PT Physical Therapist          Therapy Education  Given: HEP  Program: Reinforced  How Provided: Verbal  Provided to: Patient  Level of Understanding: Verbalized              Time Calculation:   Start Time: 1015  Stop Time: 1103  Time Calculation (min): 48 min  Total Timed Code Minutes- PT: 48 minute(s)    Therapy Charges for Today     Code Description Service Date Service Provider Modifiers Qty    01691464940 HC PT THER PROC EA 15 MIN 11/15/2018 Edna Wells, PT GP 3    02471516226 HC PT THER SUPP EA 15 MIN 11/15/2018 Edna Wells PT GP 1                    Edna Wells PT, DPT, CSCS  11/15/2018

## 2018-11-20 ENCOUNTER — HOSPITAL ENCOUNTER (OUTPATIENT)
Dept: PHYSICAL THERAPY | Facility: HOSPITAL | Age: 73
Setting detail: THERAPIES SERIES
Discharge: HOME | End: 2018-11-20

## 2018-11-20 DIAGNOSIS — M79.7 FIBROMYALGIA: Primary | ICD-10-CM

## 2018-11-20 PROCEDURE — G8982 BODY POS GOAL STATUS: HCPCS | Performed by: PHYSICAL THERAPIST

## 2018-11-20 PROCEDURE — 97110 THERAPEUTIC EXERCISES: CPT | Performed by: PHYSICAL THERAPIST

## 2018-11-20 PROCEDURE — G8983 BODY POS D/C STATUS: HCPCS | Performed by: PHYSICAL THERAPIST

## 2018-11-20 NOTE — THERAPY DISCHARGE NOTE
Outpatient Physical Therapy Ortho Treatment Note/Discharge Summary  [facility]  Edna Wells, PT, DPT, CSCS       Patient Name: Sophia Neff  : 1945  MRN: [id number]  Today's Date: 2018      Visit Date: 2018     Pt reports 0/10 pain pre treatment, 0/10 pain post treatment  Reports 60% of improvement.  Attended 3/3 visits.  Insurance available: medicare guidelines/PT only  Next MD appt: 1 month .  Recertification: N/A        Visit Dx:    ICD-10-CM ICD-9-CM   1. Fibromyalgia M79.7 729.1            Past Medical History:   Diagnosis Date   • Anemia    • Arthritis    • Diabetes mellitus (CMS/HCC)    • Fibromyalgia    • High cholesterol    • Hypertension    • Plantar fasciitis     Bilateral        Past Surgical History:   Procedure Laterality Date   • APPENDECTOMY  1970   • BREAST SURGERY Bilateral 1992    Reduction   • GALLBLADDER SURGERY  2004   • HYSTERECTOMY     • NECK SURGERY  1989   • PELVIC FLOOR REPAIR  2009   • PELVIC FLOOR REPAIR  2009       PT Ortho     Row Name 18 0800       Subjective Comments    Subjective Comments  Patient reports no pain. Reports she gets muscle cramps at night in her legs mainly.  -AJ       Precautions and Contraindications    Precautions/Limitations  no known precautions/limitations  -       Subjective Pain    Able to rate subjective pain?  yes  -    Pre-Treatment Pain Level  0  -       Posture/Observations    Posture/Observations Comments  No distress, no guarding of any kind. Fair overall postural awareness.  -      User Key  (r) = Recorded By, (t) = Taken By, (c) = Cosigned By    Initials Name Provider Type    Edna Salguero, PT Physical Therapist                      PT Assessment/Plan     Row Name 18 0800          PT Assessment    Assessment Comments  I with land strengthening program. All goals met.  -RIAZ        PT Plan    PT Frequency  -- N/A  -AJ  "    PT Plan Comments  D/C today with a final HEP and free 30 day fitness formual memembership.  -AJ       User Key  (r) = Recorded By, (t) = Taken By, (c) = Cosigned By    Initials Name Provider Type    Edna Salguero, PT Physical Therapist              Exercises     Row Name 11/20/18 0800             Subjective Comments    Subjective Comments  Patient reports no pain. Reports she gets muscle cramps at night in her legs mainly.  -AJ         Subjective Pain    Able to rate subjective pain?  yes  -AJ      Pre-Treatment Pain Level  0  -AJ      Post-Treatment Pain Level  0  -AJ         Exercise 1    Exercise Name 1  Pro II UE/LE F/R  -AJ      Time 1  10 minutes  -AJ      Additional Comments  L 4.0  -AJ         Exercise 2    Exercise Name 2  RTB Rows: Lo, Mid  -AJ      Reps 2  20 each  -AJ         Exercise 3    Exercise Name 3  RTB B shoulder ext  -AJ      Reps 3  20  -AJ         Exercise 4    Exercise Name 4  RTB Ch pulls, clocks  -AJ      Reps 4  20 each  -AJ         Exercise 5    Exercise Name 5  RTB no $  -AJ      Reps 5  20   -AJ      Time 5  5\" hold  -AJ         Exercise 6    Exercise Name 6  Sit to/from stand with lumbar ext  -AJ      Sets 6  2  -AJ      Reps 6  10  -AJ      Time 6  5\" holds  -AJ         Exercise 7    Exercise Name 7  St. HR/TR  -AJ      Reps 7  20 each  -AJ         Exercise 8    Exercise Name 8  St. lumbar SB with 5# DB  -AJ      Reps 8  20 each side  -AJ         Exercise 9    Exercise Name 9  B Step up F/L  -AJ      Reps 9  20 each  -AJ      Additional Comments  6\" step  -AJ        User Key  (r) = Recorded By, (t) = Taken By, (c) = Cosigned By    Initials Name Provider Type    Edna Salguero, PT Physical Therapist                         PT OP Goals     Row Name 11/20/18 0800          PT Short Term Goals    STG Date to Achieve  11/30/18  -AJ     STG 1  I with land stretching program.  -AJ     STG 1 Progress  Met  -AJ     STG 2  I with land strengthening program.  -AJ     STG " 2 Progress  Met  -     STG 3  I with aquatic program.  -     STG 3 Progress  Met  -     STG 4  D/C with a final HEP and free 30 day fitness formula memebership.  -     STG 4 Progress  Met  -        Time Calculation    PT Goal Re-Cert Due Date  -- N/A  -       User Key  (r) = Recorded By, (t) = Taken By, (c) = Cosigned By    Initials Name Provider Type    Edna Salguero, PT Physical Therapist          Therapy Education  Given: HEP  Program: Reinforced  How Provided: Verbal  Provided to: Patient  Level of Understanding: Verbalized              Time Calculation:   Start Time: 0846  Stop Time: 0933  Time Calculation (min): 47 min  Total Timed Code Minutes- PT: 47 minute(s)    Therapy Charges for Today     Code Description Service Date Service Provider Modifiers Qty    79261750852 HC PT CHNG MAIN POS PROJECTED 11/20/2018 Edna Wells, PT GP, CH 1    49685122693 HC PT CHNG MAIN POS DISCHARGE 11/20/2018 Edna Wells, PT GP, CI 1    67505249222 HC PT THER PROC EA 15 MIN 11/20/2018 Edna Wells, PT GP 3    76685778648 HC PT THER SUPP EA 15 MIN 11/20/2018 Edna Wells, PT GP 1          PT G-Codes  Functional Limitation: Changing and maintaining body position  Changing and Maintaining Body Position Goal Status (): 0 percent impaired, limited or restricted  Changing and Maintaining Body Position Discharge Status (): At least 1 percent but less than 20 percent impaired, limited or restricted     OP PT Discharge Summary  Date of Discharge: 11/20/18  Reason for Discharge: All goals achieved, Independent  Outcomes Achieved: Able to achieve all goals within established timeline  Discharge Destination: Home with home program  Discharge Instructions/Additional Comments: D/C with a final HEP and free 30 day fitness formula memembership.      Edna Wells, PT, DPT, CSCS  11/20/2018

## 2019-02-11 ENCOUNTER — OFFICE VISIT (OUTPATIENT)
Dept: OBSTETRICS AND GYNECOLOGY | Facility: CLINIC | Age: 74
End: 2019-02-11

## 2019-02-11 VITALS
HEART RATE: 100 BPM | HEIGHT: 62 IN | SYSTOLIC BLOOD PRESSURE: 155 MMHG | DIASTOLIC BLOOD PRESSURE: 81 MMHG | BODY MASS INDEX: 32.02 KG/M2 | WEIGHT: 174 LBS

## 2019-02-11 DIAGNOSIS — N81.2 CYSTOCELE AND RECTOCELE WITH INCOMPLETE UTEROVAGINAL PROLAPSE: ICD-10-CM

## 2019-02-11 DIAGNOSIS — Z01.411 ENCOUNTER FOR GYNECOLOGICAL EXAMINATION WITH ABNORMAL FINDING: Primary | ICD-10-CM

## 2019-02-11 DIAGNOSIS — Z12.31 ENCOUNTER FOR SCREENING MAMMOGRAM FOR BREAST CANCER: ICD-10-CM

## 2019-02-11 PROCEDURE — G0101 CA SCREEN;PELVIC/BREAST EXAM: HCPCS | Performed by: NURSE PRACTITIONER

## 2019-02-11 RX ORDER — LOSARTAN POTASSIUM 50 MG/1
TABLET ORAL
COMMUNITY
End: 2019-03-27

## 2019-02-11 RX ORDER — BLOOD-GLUCOSE METER
KIT MISCELLANEOUS
COMMUNITY
Start: 2019-01-12 | End: 2019-11-18 | Stop reason: SDUPTHER

## 2019-02-11 RX ORDER — LANCETS 28 GAUGE
EACH MISCELLANEOUS
COMMUNITY
Start: 2019-01-12 | End: 2019-11-18 | Stop reason: SDUPTHER

## 2019-02-11 RX ORDER — DOCUSATE SODIUM 100 MG/1
100 CAPSULE, LIQUID FILLED ORAL 3 TIMES DAILY
COMMUNITY
Start: 2019-01-04 | End: 2020-06-08

## 2019-02-11 RX ORDER — CYCLOSPORINE 0.5 MG/ML
EMULSION OPHTHALMIC
COMMUNITY
Start: 2019-01-17

## 2019-02-11 RX ORDER — HYDRALAZINE HYDROCHLORIDE 10 MG/1
10 TABLET, FILM COATED ORAL 3 TIMES DAILY
COMMUNITY
Start: 2018-12-28 | End: 2021-07-15

## 2019-02-11 RX ORDER — ACETAMINOPHEN 325 MG/1
TABLET ORAL
COMMUNITY

## 2019-02-11 RX ORDER — PANTOPRAZOLE SODIUM 40 MG/1
TABLET, DELAYED RELEASE ORAL
COMMUNITY
End: 2019-03-27

## 2019-02-11 RX ORDER — GLIPIZIDE 5 MG/1
TABLET ORAL
COMMUNITY
Start: 2019-01-25 | End: 2019-04-09

## 2019-02-11 RX ORDER — DULAGLUTIDE 1.5 MG/.5ML
1.5 INJECTION, SOLUTION SUBCUTANEOUS WEEKLY
COMMUNITY
Start: 2018-12-28 | End: 2019-08-12 | Stop reason: SDUPTHER

## 2019-02-11 RX ORDER — LORATADINE 10 MG/1
TABLET ORAL
COMMUNITY

## 2019-02-11 NOTE — PROGRESS NOTES
Subjective   Chief Complaint   Patient presents with   • Gynecologic Exam     [facility] Neff is a 73 y.o. year old No obstetric history on file. presenting to be seen for a GYN exam. Notes that she had and A&P repair with Dr. Sheppard and Dr. Olivares in  but she thinks it's not good anymore because she can feel something down there now.    She is not sexually active.  In the past 12 months there has not been new sexual partners.  Condoms are not typically used.  She would not like to be screened for STD's at today's exam.     She exercises regularly: no.  She wears her seat belt:yes.  She has concerns about domestic violence: no.  She has noticed changes in height: no.    The following portions of the patient's history were reviewed and updated as appropriate:problem list, current medications, allergies, past family history, past medical history, past social history and past surgical history.  Social History    Tobacco Use      Smoking status: Former Smoker        Quit date:         Years since quittin.1      Smokeless tobacco: Never Used    Review of Systems   Constitutional: Negative for activity change, appetite change, diaphoresis, fatigue, unexpected weight gain and unexpected weight loss.   Respiratory: Negative for chest tightness and shortness of breath.    Cardiovascular: Negative for chest pain and palpitations.   Gastrointestinal: Positive for constipation. Negative for abdominal distention, abdominal pain and diarrhea.   Genitourinary: Negative for breast discharge, urinary incontinence, decreased libido, decreased urine volume, difficulty urinating, dysuria, pelvic pain, urgency, vaginal bleeding, vaginal discharge, vaginal pain, breast lump and breast pain.   Musculoskeletal: Negative for myalgias.   Skin: Negative for color change, dry skin and skin lesions.   Neurological: Negative for light-headedness and headache.   Psychiatric/Behavioral: Negative for agitation,  "dysphoric mood, sleep disturbance, depressed mood and stress. The patient is not nervous/anxious.         Objective   /81   Pulse 100   Ht 157.5 cm (62\")   Wt 78.9 kg (174 lb)   Breastfeeding? No   BMI 31.83 kg/m²     General:  well developed; well nourished  no acute distress  obese - Body mass index is 31.83 kg/m².   Skin:  No suspicious lesions seen   Thyroid: non-palpable   Breasts:  Examined in supine position  Symmetric without masses or skin dimpling  Nipples normal without inversion, lesions or discharge  There are no palpable axillary nodes  Bilateral breast reduction scars are noted   Abdomen: soft, non-tender; no masses  no umbilical or inguinal hernias are present  no hepato-splenomegaly  Normal findings: bowel sounds normal   Pelvis: Clinical staff was present for exam  External genitalia:  normal appearance of the external genitalia including Bartholin's and [place]'s glands.  :  urethral meatus normal; urethra hypermobile;  Vaginal:  atrophic mucosal changes are present; vaginal cuff shortened  Cervix:  Absent. Pap smear not indicated.  Uterus:  absent.  Adnexa:  absent, bilateral.  Rectal:  digital rectal exam not performed; anus visually normal appearing. external hemorrhoids present;  Cystocele GRADE 2  Rectocele GRADE 3  Vaginal vault prolapse GRADE 2     Lab Review   No data reviewed    Imaging  No data reviewed        Sophia was seen today for gynecologic exam.    Diagnoses and all orders for this visit:    Encounter for gynecological examination with abnormal finding    Encounter for screening mammogram for breast cancer  -     Mammo Screening Digital Tomosynthesis Bilateral With CAD; Future    Cystocele and rectocele with incomplete uterovaginal prolapse  -     Ambulatory Referral to Physical Therapy Evaluate and treat, Pelvic Floor    Would like to see Magdalena for evaluation of cystocele and rectocele to see if there's anything that can be done to relieve \"the weird feeling in " "there\".       This note was electronically signed.    "

## 2019-02-13 ENCOUNTER — HOSPITAL ENCOUNTER (OUTPATIENT)
Dept: PHYSICAL THERAPY | Facility: HOSPITAL | Age: 74
Setting detail: THERAPIES SERIES
Discharge: HOME | End: 2019-02-13

## 2019-02-13 DIAGNOSIS — N81.84 PELVIC RELAXATION DUE TO PELVIC MUSCLE WASTING: ICD-10-CM

## 2019-02-13 DIAGNOSIS — N81.2 CYSTOCELE AND RECTOCELE WITH INCOMPLETE UTEROVAGINAL PROLAPSE: Primary | ICD-10-CM

## 2019-02-13 PROCEDURE — 97110 THERAPEUTIC EXERCISES: CPT | Performed by: PHYSICAL THERAPIST

## 2019-02-13 PROCEDURE — 97162 PT EVAL MOD COMPLEX 30 MIN: CPT | Performed by: PHYSICAL THERAPIST

## 2019-02-13 NOTE — THERAPY EVALUATION
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 Cystocele and rectocele with incomplete uterovaginal prolapse N81.2 618.2   2. 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  Dr. Sheppard repaired via bladder mesh with ant repair approx May 2009.  Had problems with mesh with rectum still having a \"hole\".  Second surgery bladder tact post wall.  Did well until a few months where she noted new drooping of bladder again.  Feels like a sac hanging down into vaginal canal.  No real pain associated just notes the \"hanging\" down into vaginal canal.  Doesn't hurt at all.  More of a nussiance than anything.  No bladder leakage currently.  Drinks coffee all day long.  Drinks water periodically as well.  4oz milk consumption noted as well.  Constipation none.  NO required fiber supplement required with bowel movements.  Some straining noted with bowels " ocassionally.  No straining to empty bladder.  Feels that she empties well post void.    -SW         Pregnancy Questions    Number of Pregnancies  4  -SW      Number of Children  3  -SW      Type of Previous Deliveries  Vaginal  -SW         Subjective Pain    Able to rate subjective pain?  yes  -SW      Pre-Treatment Pain Level  1  -SW      Subjective Pain Comment  muscle pain only from Fibromyalgia  -SW         Pelvic Floor Muscle    Strength (Right)  3: Squeeze with/without lift  -SW      Strength (Left)  3: Squeeze with/without lift  -SW      Symmetry of Sustained Maximal Contraction  Symmetrical  -SW      Endurance (Ability to Hold Maximal Contraction)  3 sec  -SW      # of Reps of Maximal Contractions while Maintaining Endurance and Strength  5 sets  -SW      Fast Contraction (# of 1 sec contractions performed)  10  -SW      Interior Muscle Comments  Mild tenderness noted of post fourchutte but otherweise negatiive.    -SW         Reflex Testing    Cough Reflex  0: Absent  -SW         Prolapse (Pop-Q)    Cystocele  Stage II  -SW      Rectocele  Stage I;Stage II  -SW      Urethrocele  Stage II  -SW         SEMG Evaluation    Pelvic Floor Electrode  Internal Vaginal  -SW      Baseline Resting  Yes  -SW      SEMG Evaluation Comments  demonstrates normal resting tone. Amplitudes are very poor at leass than 3.2 mv working tones.  Full return to baseline measures.  Endurance x 10 sec but with low amplitudes of contrtaction.   -SW         Education Provided On:    Education Points  HEP;Behavioral modifications  -SW      Method of Delivery  Verbal;Demonstration;Written  -SW      Education Provided To  Patient  -SW      Level of Understanding  Teach back education performed;Verbalized;Demonstrated  -SW      Education Comments  Review HEP next visit.   -SW        User Key  (r) = Recorded By, (t) = Taken By, (c) = Cosigned By    Initials Name Provider Type    Magdalena Bunch PT Physical Therapist        PT Ortho   "   Row Name 02/13/19 1200       Subjective Pain    Post-Treatment Pain Level  1  -       Posture/Observations    Posture/Observations Comments  Ambulates with SC.  Alert and oriented x 3.  Good historian of medical events.    -      User Key  (r) = Recorded By, (t) = Taken By, (c) = Cosigned By    Initials Name Provider Type    Magdalena Bunch, PT Physical Therapist                     PT Assessment/Plan     Row Name 02/13/19 1200          PT Assessment    Functional Limitations  Performance in sport activities;Performance in work activities;Performance in self-care ADL;Performance in leisure activities;Limitations in community activities  -SW     Impairments  Locomotion;Pain;Peripheral nerve integrity  -     Assessment Comments  Patient is a 72 yo female presenting to clinic with pelvic muscle instability with resultant cystocele and rectocele.  She would be an excellent candidate for skilled therapy intervention to address issues associated with muscle weakness.   -     Rehab Potential  Good  -     Patient/caregiver participated in establishment of treatment plan and goals  Yes  -SW     Patient would benefit from skilled therapy intervention  Yes  -SW        PT Plan    PT Frequency  1x/week  -     Predicted Duration of Therapy Intervention (Therapy Eval)  8 visits  -     PT Plan Comments  Behavioral training, PF strengthening with/without stimulation. Dietary education as necesary.   -       User Key  (r) = Recorded By, (t) = Taken By, (c) = Cosigned By    Initials Name Provider Type    Magdalena Bunch, PT Physical Therapist            Exercises     Row Name 02/13/19 1200             Subjective Comments    Subjective Comments  Reports problems with pelvic muscle wasting.  Dr. Sheppard repaired via bladder mesh with ant repair approx May 2009.  Had problems with mesh with rectum still having a \"hole\".  Second surgery bladder tact post wall.  Did well until a few months where she noted new " "drooping of bladder again.  Feels like a sac hanging down into vaginal canal.  No real pain associated just notes the \"hanging\" down into vaginal canal.  Doesn't hurt at all.  More of a nussiance than anything.  No bladder leakage currently.  Drinks coffee all day long.  Drinks water periodically as well.  4oz milk consumption noted as well.  Constipation none.  NO required fiber supplement required with bowel movements.  Some straining noted with bowels ocassionally.  No straining to empty bladder.  Feels that she empties well post void.    -         Subjective Pain    Able to rate subjective pain?  yes  -      Pre-Treatment Pain Level  1  -      Post-Treatment Pain Level  1  -      Subjective Pain Comment  muscle pain only from Fibromyalgia  -         Total Minutes    57583 - PT Therapeutic Exercise Minutes  15  -         Exercise 1    Exercise Name 1  isolated PF contraction  -      Additional Comments  patient able to demo proper recruitment patterns. Isolated contractions noted.  -         Exercise 2    Exercise Name 2  isoalted endurance contractions  -      Additional Comments  Poor amplitude of contraction, but does note 1/10.  -         Exercise 3    Exercise Name 3  behavioral management address/intro-duced.   -        User Key  (r) = Recorded By, (t) = Taken By, (c) = Cosigned By    Initials Name Provider Type     Magdalena Aguero, PT Physical Therapist                      PT OP Goals     Row Name 02/13/19 1459 02/13/19 1200       PT Short Term Goals    [facility] Date to Achieve  --  03/13/19  -    STG 1  --  patient to be independent with pelvic rest position x 20 minutes daily to provide gravity assist in prolapse reduction.   -    STG 1 Progress  --  New  -    STG 2  --  patient to void every 3-4 hours during the daytime without strain required for evacuation.  -    STG 2 Progress  --  New  -    STG 3  --  isolated PF contractions with amplitudes of 3.5 of > implying " improved recruitment patterns.   -    STG 3 Progress  --  New  -       Long Term Goals    LTG Date to Achieve  --  05/10/19  -    LTG 1  --  bladder void schedule of 3-4 hours  -    LTG 1 Progress  --  New  -    LTG 2  --  no needs for straining to bowels for defectaion such that she suffers with constipation .  -    LTG 2 Progress  --  New  -    LTG 3  --  Be able to demo coordination of PF in seated position against gravity to pull in 4+ mv or less.   -SW    LTG 3 Progress  --  New  -SW    LTG 4  --  achieve personal goals of dec progression of POP.   -    LTG 4 Progress  --  New  -       Time Calculation    PT Goal Re-Cert Due Date  02/06/19  -SW  03/13/19  -      User Key  (r) = Recorded By, (t) = Taken By, (c) = Cosigned By    Initials Name Provider Type    Magdalena Bunch, PT Physical Therapist                Outcome Measure Options: Other Outcome Measure  Other Outcome Measure Tool Used  Other Outcome Measure Tool Comments: PFPI SF 20=27.05; POPDI6=20.8; CRAD8=2 and URI 0      Time Calculation:   Start Time: 1218  Stop Time: 1325  Time Calculation (min): 67 min  Therapy Suggested Charges     Code   Minutes Charges    27084 (CPT®) Hc Pt Neuromusc Re Education Ea 15 Min      56981 (CPT®) Hc Pt Ther Proc Ea 15 Min 15 1    64062 (CPT®) Hc Gait Training Ea 15 Min      69174 (CPT®) Hc Pt Therapeutic Act Ea 15 Min      04729 (CPT®) Hc Pt Manual Therapy Ea 15 Min      97263 (CPT®) Hc Pt Ther Massage- Per 15 Min      05971 (CPT®) Hc Pt Iontophoresis Ea 15 Min      68554 (CPT®) Hc Pt Elec Stim Ea-Per 15 Min      83715 (CPT®) Hc Pt Ultrasound Ea 15 Min      69192 (CPT®) Hc Pt Self Care/Mgmt/Train Ea 15 Min      10874 (CPT®) Hc Pt Prosthetic (S) Train Initial Encounter, Each 15 Min      84793 (CPT®) Hc Orthotic(S) Mgmt/Train Initial Encounter, Each 15min      31144 (CPT®) Hc Pt Aquatic Therapy Ea 15 Min      79684 (CPT®) Hc Pt Orthotic(S)/Prosthetic(S) Encounter, Each 15 Min       (CPT®)  Hc Pt Electrical Stim Unattended      Total  15 1        Therapy Charges for Today     Code Description Service Date Service Provider Modifiers Qty    46913293675 HC PT THER PROC EA 15 MIN 2/13/2019 Magdalena Aguero, PT GP 1    18625992180 HC PT EVAL MOD COMPLEXITY 3 2/13/2019 Magdalena Aguero, PT GP 1          PT G-Codes  Outcome Measure Options: Other Outcome Measure       Magdalena Aguero, PT  2/13/2019

## 2019-02-20 ENCOUNTER — HOSPITAL ENCOUNTER (OUTPATIENT)
Dept: PHYSICAL THERAPY | Facility: HOSPITAL | Age: 74
Setting detail: THERAPIES SERIES
Discharge: HOME | End: 2019-02-20

## 2019-02-20 DIAGNOSIS — N81.84 PELVIC RELAXATION DUE TO PELVIC MUSCLE WASTING: Primary | ICD-10-CM

## 2019-02-20 DIAGNOSIS — N81.2 CYSTOCELE AND RECTOCELE WITH INCOMPLETE UTEROVAGINAL PROLAPSE: ICD-10-CM

## 2019-02-20 PROCEDURE — 97110 THERAPEUTIC EXERCISES: CPT | Performed by: PHYSICAL THERAPIST

## 2019-02-20 PROCEDURE — G0283 ELEC STIM OTHER THAN WOUND: HCPCS | Performed by: PHYSICAL THERAPIST

## 2019-02-20 NOTE — THERAPY TREATMENT NOTE
Outpatient Physical Therapy Women's Health Treatment Note  [facility]     Patient Name: oSphia Neff  : 1945  MRN: [id number]  Today's Date: 2019        Visit Date: 2019  Visit number:   Recheck: 3/19/19  RTD: pending    Visit Dx:    ICD-10-CM ICD-9-CM   1. 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 Cystocele and rectocele with incomplete uterovaginal prolapse N81.2 618.2       Patient Active Problem List   Diagnosis   • Cystocele and rectocele with incomplete uterovaginal prolapse        Pelvic Health     Row Name 19 1300             Pregnancy Questions    Number of Pregnancies  4  -SW      Number of Children  3  -SW      Type of Previous Deliveries  Vaginal  -SW         Pelvic Floor Muscle    Symmetry of Sustained Maximal Contraction  Symmetrical  -SW      Endurance (Ability to Hold Maximal Contraction)  3 sec  -SW      # of Reps of Maximal Contractions while Maintaining Endurance and Strength  5 sets  -SW      Fast Contraction (# of 1 sec contractions performed)  10  -SW         Reflex Testing    Cough Reflex  0: Absent  -SW         Prolapse (Pop-Q)    Cystocele  Stage II  -SW      Rectocele  Stage I;Stage II  -SW      Urethrocele  Stage II  -SW         SEMG Evaluation    Pelvic Floor Electrode  Internal Vaginal  -SW      Baseline Resting  Yes  -SW      SEMG Evaluation Comments  see ther ex notes in chart.    -SW         Education Provided On:    Education Points  HEP;Behavioral modifications  -SW      Method of Delivery  Verbal;Demonstration;Written  -SW      Education Provided To  Patient  -SW      Level of Understanding  Teach back education performed;Verbalized;Demonstrated  -SW      HEP Comments  Endurance: 5 sec hold x 5 reps x 3 sets.    -SW        User Key  (r) = Recorded By, (t) = Taken By, (c) = Cosigned By    Initials Name Provider Type    SW Magdalena Aguero PT Physical Therapist        PT Ortho     Row Name 19 1300        Subjective Comments    Subjective Comments  Did the exercises every night and doing them also while seated in chair.  Void schedule of 3-4 hours.  Not much leakage at all.  Bowels moving good.  Consumes milk (4 oz), coffee, and water.  Patient struggles with t/fs supine position due to pain in abdominal region.  Uses bed elevation to assist with dec strain to abdominal wall.   -SW       Posture/Observations    Posture/Observations Comments  Patient noted to strain with TA with t/f supine to sit. Educated with use of TA support for positioning.  -      User Key  (r) = Recorded By, (t) = Taken By, (c) = Cosigned By    Initials Name Provider Type    Magdalena Bunch, PT Physical Therapist                     PT Assessment/Plan     Row Name 02/20/19 1300          PT Assessment    Functional Limitations  Performance in sport activities;Performance in work activities;Performance in self-care ADL;Performance in leisure activities;Limitations in community activities  -     Impairments  Locomotion;Pain;Peripheral nerve integrity  -SW     Assessment Comments  Patient demonstrates good understanding of PF contraction with isolation.  Amplitudes however, present weak and with low amplitudes.  Initiated stimulation for this reason.  Patient able to show improved recruitment post stimulation to 3.3 PF QF.  Will need continuum of stimulation to promote inc support to PF. Concerned that patient is not using TA support for transfers and exercise in general.  Educated this date on elevation of PF with effort of exercise as one exhales.  She was able to return demo with cues for performance.  This concept should also be used when tranferring from supine to sit.  Patient utilizes bed position to assist with upright position from sleep.  Concerned she will continue to grow weaker in UE without use.  Tried to encourage proper use this visit to maximize benefit of PF support and not offer compressing forces against it.   Patient will need reinforcement.   -SW     Rehab Potential  Good  -SW     Patient/caregiver participated in establishment of treatment plan and goals  Yes  -SW     Patient would benefit from skilled therapy intervention  Yes  -SW        PT Plan    PT Frequency  1x/week  -SW     Predicted Duration of Therapy Intervention (Therapy Eval)  8 visits  -     PT Plan Comments  Continue with stimuation to PF for inc stabilty to PF to help support POP. Review exercises issued this visit.   -SW       User Key  (r) = Recorded By, (t) = Taken By, (c) = Cosigned By    Initials Name Provider Type    Magdalena Bunch, PT Physical Therapist          Modalities     Row Name 02/20/19 1300             ELECTRICAL STIMULATION    Attended/Unattended  Unattended  -      Stimulation Type  -- NMES to PF  -SW      Max mAmp  -- 50 MHz; 10 min treatment; 5:10; intensity 19  -      Location/Electrode Placement/Other  -- intravaginal PF  -SW       PT E-Stim Unattended (Manual) Minutes  10  -SW        User Key  (r) = Recorded By, (t) = Taken By, (c) = Cosigned By    Initials Name Provider Type    Magdalena Bunch, PT Physical Therapist            Exercises     Row Name 02/20/19 1300             Subjective Comments    Subjective Comments  Did the exercises every night and doing them also while seated in chair.  Void schedule of 3-4 hours.  Not much leakage at all.  Bowels moving good.  Consumes milk (4 oz), coffee, and water.  Patient struggles with t/fs supine position due to pain in abdominal region.  Uses bed elevation to assist with dec strain to abdominal wall.   -SW         Subjective Pain    Able to rate subjective pain?  yes  -SW      Pre-Treatment Pain Level  1  -SW      Post-Treatment Pain Level  1  -SW      Subjective Pain Comment  lower abdominal region   -SW         Exercise 1    Exercise Name 1  behavioral management.   -SW      Additional Comments  Discussed void frequency 3-4 hours.  Dec irritants with intake.   Void position for improved evacuation.   -SW         Exercise 2    Exercise Name 2  pelvic rest position  -SW      Additional Comments  demonstrated with return teach back.   -SW         Exercise 3    Exercise Name 3  SEMG supine resting  -SW      Additional Comments  Normal baselines for PF in supine position. consistent and stable.   -SW         Exercise 4    Exercise Name 4  SEMG QF supine  -SW      Sets 4  2  -SW      Reps 4  10  -SW      Additional Comments  Able to contract muscles with verbal command only.  Full relaxation to follow. Amplitudes remain low at 2.5 to 3.2 mv.   -SW         Exercise 5    Exercise Name 5  SEMG Endurance supine  -SW      Sets 5  2  -SW      Reps 5  5  -SW      Time 5  5 sec  -SW      Additional Comments  Endurance challenges amplitudes even more than QF around 2.2 to 3.0.  Begin stimulation to encourage recruitment.   -SW         Exercise 6    Exercise Name 6  bridging  -SW      Sets 6  2  -SW      Reps 6  10  -SW      Additional Comments  cues for PF tension first.   -SW         Exercise 7    Exercise Name 7  Add squeeze  -SW      Sets 7  2  -SW      Reps 7  10  -SW         Exercise 8    Exercise Name 8  isometric TA protection for transfers.   -SW      Additional Comments  Patient received demo of TA contraction with return demo/teach back for use during transfers and general mobility.  Able to demo with max cues for performance.   -SW         Exercise 9    Exercise Name 9  PF contraction with effort.   -SW      Additional Comments  Patient educated on PF elevation with effort of movement.  Encouraged to use this concept when completed exercise in gym work out to help protect abdominal wall and encourage normal movement of PF.   -SW        User Key  (r) = Recorded By, (t) = Taken By, (c) = Cosigned By    Initials Name Provider Type    Magdalena Bunch, PT Physical Therapist                      PT OP Goals     Row Name 02/20/19 1300          PT Short Term Goals    STG Date  to Achieve  03/13/19  -     STG 1  patient to be independent with pelvic rest position x 20 minutes daily to provide gravity assist in prolapse reduction.   -     STG 1 Progress  Met  -     STG 2  patient to void every 3-4 hours during the daytime without strain required for evacuation.  -     STG 2 Progress  Progressing  -     STG 3  isolated PF contractions with amplitudes of 3.5 of > implying improved recruitment patterns.   -     STG 3 Progress  Ongoing  -     STG 4  --  -     STG 4 Progress  --  -     STG 5  --  -     STG 5 Progress  --  -        Long Term Goals    LTG Date to Achieve  05/10/19  -     LTG 1  bladder void schedule of 3-4 hours  -     LTG 1 Progress  New  -     LTG 2  no needs for straining to bowels for defectaion such that she suffers with constipation .  -     LTG 2 Progress  New  -     LTG 3  Be able to demo coordination of PF in seated position against gravity to pull in 4+ mv or less.   -     LTG 3 Progress  New  -     LTG 4  achieve personal goals of dec progression of POP.   -     LTG 4 Progress  New  -     LTG 5  --  -     LTG 5 Progress  --  -     LTG 6  I with all aquatics.  -     LTG 6 Progress  Met  -     LTG 7  I with land final HEP.  -     LT 7 Progress  Met  -     LTG 8  D/C with free 30 day fitness formula membership.  -[facility] Progress  Met  -        Time Calculation    PT Goal Re-Cert Due Date  03/13/19  -       User Key  (r) = Recorded By, (t) = Taken By, (c) = Cosigned By    Initials Name Provider Type    Magdalena Bunch, PT Physical Therapist           Therapy Education  Given: HEP, Symptoms/condition management  Program: New, Reinforced  How Provided: Verbal, Demonstration, Written  Provided to: Patient  Level of Understanding: Teach back education performed, Verbalized, Demonstrated              Time Calculation:   Start Time: 1310  Stop Time: 1419  Time Calculation (min): 69 min  Therapy Suggested  Charges     Code   Minutes Charges    81756 (CPT®) Hc Pt Neuromusc Re Education Ea 15 Min      63989 (CPT®) Hc Pt Ther Proc Ea 15 Min      94647 (CPT®) Hc Gait Training Ea 15 Min      24256 (CPT®) Hc Pt Therapeutic Act Ea 15 Min      20202 (CPT®) Hc Pt Manual Therapy Ea 15 Min      30688 (CPT®) Hc Pt Ther Massage- Per 15 Min      79677 (CPT®) Hc Pt Iontophoresis Ea 15 Min      94346 (CPT®) Hc Pt Elec Stim Ea-Per 15 Min      89269 (CPT®) Hc Pt Ultrasound Ea 15 Min      08452 (CPT®) Hc Pt Self Care/Mgmt/Train Ea 15 Min      12987 (CPT®) Hc Pt Prosthetic (S) Train Initial Encounter, Each 15 Min      38979 (CPT®) Hc Orthotic(S) Mgmt/Train Initial Encounter, Each 15min      82485 (CPT®) Hc Pt Aquatic Therapy Ea 15 Min      88230 (CPT®) Hc Pt Orthotic(S)/Prosthetic(S) Encounter, Each 15 Min       (CPT®) Hc Pt Electrical Stim Unattended 10 1    Total  10 1        Therapy Charges for Today     Code Description Service Date Service Provider Modifiers Qty    11765068327 HC PT ELECTRICAL STIM UNATTENDED 2/20/2019 Magdalena Aguero, PT  1    33945638152 HC PT THER PROC EA 15 MIN 2/20/2019 Magdalena Aguero, PT GP 4                    Magdalena Aguero, PT  2/20/2019

## 2019-03-06 ENCOUNTER — HOSPITAL ENCOUNTER (OUTPATIENT)
Dept: PHYSICAL THERAPY | Facility: HOSPITAL | Age: 74
Setting detail: THERAPIES SERIES
Discharge: HOME | End: 2019-03-06

## 2019-03-06 DIAGNOSIS — N81.2 CYSTOCELE AND RECTOCELE WITH INCOMPLETE UTEROVAGINAL PROLAPSE: ICD-10-CM

## 2019-03-06 DIAGNOSIS — N81.84 PELVIC RELAXATION DUE TO PELVIC MUSCLE WASTING: Primary | ICD-10-CM

## 2019-03-06 PROCEDURE — 97110 THERAPEUTIC EXERCISES: CPT | Performed by: PHYSICAL THERAPIST

## 2019-03-06 PROCEDURE — G0283 ELEC STIM OTHER THAN WOUND: HCPCS | Performed by: PHYSICAL THERAPIST

## 2019-03-06 NOTE — THERAPY RE-EVALUATION
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 Cystocele and rectocele with incomplete uterovaginal prolapse N81.2 618.2           Pelvic Health     Row Name 19 1300             Subjective Comments    Subjective Comments  Cancelled last week due to schedule mix up.  Arrived late for appt due to running around all day with other appts.  Hasn't been able to do all exercises as requested.  Bladder felt like it was just coming loose.  Everything hurting this morning.  MD ordered MRI to LB/pelvis to rule out pinched nerve.  Lower abdomen is doing some better. Leak occurred with bending forward to pick somenthing up from floor.    -SW         Pregnancy Questions    Number of Pregnancies  4  -SW      Number of Children  3  -SW      Type of Previous Deliveries  Vaginal  -SW         Pelvic Floor Muscle    Strength (Right)  3: Squeeze with/without lift  -SW      Strength (Left)  3: Squeeze with/without lift  -SW       Endurance (Ability to Hold Maximal Contraction)  5 sec  -SW      # of Reps of Maximal Contractions while Maintaining Endurance and Strength  5 sets  -SW      Fast Contraction (# of 1 sec contractions performed)  10  -SW      Interior Muscle Comments  No advancement of prolapse noted in appearance. Good approximation. Good PF recruitment without subsitution.    -SW         Prolapse (Pop-Q)    Cystocele  Stage II  -SW      Rectocele  Stage II  -SW      Urethrocele  Stage II  -SW         SEMG Evaluation    Pelvic Floor Electrode  Internal Vaginal  -SW      Baseline Resting  Yes  -SW      SEMG Evaluation Comments  see ther ex for notes.   -SW         Education Provided On:    Education Points  HEP;Behavioral modifications  -SW      Method of Delivery  Verbal;Demonstration;Written  -SW      Education Provided To  Patient  -SW      HEP Comments  Endurance 5 sec x 5 reps in supine position x 3 sets; QF seated x 10  -SW        User Key  (r) = Recorded By, (t) = Taken By, (c) = Cosigned By    Initials Name Provider Type    Magdalena Bunch, PT Physical Therapist        PT Ortho     Row Name 03/06/19 1300       Subjective Pain    Able to rate subjective pain?  yes  -SW    Pre-Treatment Pain Level  0  -SW    Post-Treatment Pain Level  0  -SW    Subjective Pain Comment  1/10 elbow  -SW       Posture/Observations    Posture/Observations Comments  Gaits into clinic with SC.  Alert and oriented with good spirits.    -SW      User Key  (r) = Recorded By, (t) = Taken By, (c) = Cosigned By    Initials Name Provider Type    Magdalena Bunch, PT Physical Therapist                     PT Assessment/Plan     Row Name 03/06/19 1400          PT Assessment    Assessment Comments  Patient showing improved muscle function with PF training.  Increased recruitment amplitudes noted with  isolated movements.  Good tolerance to stimulation for uptraining to muscles.  Void schedule is progressed to 3-4 hours without difficulty.  Good goal progression noted.  Still needs uptraining to better support PF.  Patient internested in pessary fitting on trial basis therefore john set up appt with referring.   -SW     Rehab Potential  Good  -SW     Patient/caregiver participated in establishment of treatment plan and goals  Yes  -SW     Patient would benefit from skilled therapy intervention  Yes  -SW        PT Plan    PT Frequency  1x/week  -SW     Predicted Duration of Therapy Intervention (Therapy Eval)  8 visits  -SW     PT Plan Comments  Continue with uptraining.  Transition to seated position next visit.  HEP established.  Pessary consult 3/27 at 1400.  -SW       User Key  (r) = Recorded By, (t) = Taken By, (c) = Cosigned By    Initials Name Provider Type    Magdalena Bunch, PT Physical Therapist          Modalities     Row Name 03/06/19 1300             ELECTRICAL STIMULATION    Attended/Unattended  Unattended  -SW      Stimulation Type  -- NMES to PF  -SW      Max mAmp  -- 50 MHz; 10 min treatment; 5:10; intensity 12  -SW      Location/Electrode Placement/Other  -- intravaginal PF  -SW       PT E-Stim Unattended (Manual) Minutes  10  -SW        User Key  (r) = Recorded By, (t) = Taken By, (c) = Cosigned By    Initials Name Provider Type    Magdalena Bunch, PT Physical Therapist        Exercises     Row Name 03/06/19 1300             Subjective Comments    Subjective Comments  Cancelled last week due to schedule mix up.  Arrived late for appt due to running around all day with other appts.  Hasn't been able to do all exercises as requested.  Bladder felt like it was just coming loose.  Everything hurting this morning.  MD ordered MRI to LB/pelvis to rule out pinched nerve.  Lower abdomen is doing some better. Leak occurred with bending forward to pick somenthing up from floor.    -SW         Subjective Pain    Able to rate subjective pain?  yes  -SW      Pre-Treatment Pain Level  0  -SW      Post-Treatment Pain Level  0  -SW       Subjective Pain Comment  1/10 elbow  -SW         Exercise 1    Exercise Name 1  supine SEMg  -SW      Additional Comments  Normal resting tone achieved in supine position.  -SW         Exercise 2    Exercise Name 2  QF supine  -SW      Sets 2  2  -SW      Reps 2  10  -SW      Additional Comments  Able to recruit PF in timely fashion though amplitudes of contraction are low.  3.0mv tone with activation.  Undelayed relaxation to follow. Second set of 10 with improved activation of tone.  Noted amplitudes of 4.8 for 5 reps of set and others above resting tone though slight.   -SW         Exercise 3    Exercise Name 3  SEMG supine endurance  -SW      Sets 3  2  -SW      Reps 3  5  -SW      Time 3  5 sec  -SW      Additional Comments  Good stability to recruitment of muscles. Amplitudes of 3.2 to 4.0.    -SW        User Key  (r) = Recorded By, (t) = Taken By, (c) = Cosigned By    Initials Name Provider Type    Magdalena Bunch, PT Physical Therapist                      PT OP Goals     Row Name 03/06/19 1400          PT Short Term Goals    STG Date to Achieve  04/04/19  -     STG 1  patient to be independent with pelvic rest position x 20 minutes daily to provide gravity assist in prolapse reduction.   -SW     STG 1 Progress  Met  -     STG 2  patient to void every 3-4 hours during the daytime without strain required for evacuation.  -     STG 2 Progress  Met  -     STG 3  isolated PF contractions with amplitudes of 3.5 of > implying improved recruitment patterns.   -     STG 3 Progress  Ongoing;Progressing  -     STG 3 Progress Comments  amplitudes varied this date from 3.2 to 4.8.    -        Long Term Goals    LTG Date to Achieve  05/10/19  -SW     LTG 1  bladder void schedule of 3-4 hours  -     LTG 1 Progress  Progressing  -     LTG 2  no needs for straining to bowels for defectaion such that she suffers with constipation .  -     LTG 2 Progress  New  -     LTG 3  Be able to demo  coordination of PF in seated position against gravity to pull in 4+ mv or less.   -     LTG 3 Progress  New  -     LTG 4  achieve personal goals of dec progression of POP.   -     LTG 4 Progress  New  -     LTG 6  I with all aquatics.  -     LTG 6 Progress  Met  -     LTG 7  I with land final HEP.  -     LTG 7 Progress  Met  -     LTG 8  D/C with free 30 day fitness formula membership.  -     LTG 8 Progress  Met  -        Time Calculation    PT Goal Re-Cert Due Date  04/04/19  -       User Key  (r) = Recorded By, (t) = Taken By, (c) = Cosigned By    Initials Name Provider Type    Magdalena Bunch, PT Physical Therapist          Therapy Education  Given: HEP  Program: Reinforced, Progressed  How Provided: Verbal, Demonstration, Written  Provided to: Patient  Level of Understanding: Teach back education performed, Verbalized, Demonstrated               Time Calculation:   Start Time: 1330  Stop Time: 1426  Time Calculation (min): 56 min  Therapy Suggested Charges     Code   Minutes Charges    90479 (CPT®) Hc Pt Neuromusc Re Education Ea 15 Min      57213 (CPT®) Hc Pt Ther Proc Ea 15 Min      71518 (CPT®) Hc Gait Training Ea 15 Min      68898 (CPT®) Hc Pt Therapeutic Act Ea 15 Min      43938 (CPT®) Hc Pt Manual Therapy Ea 15 Min      19308 (CPT®) Hc Pt Ther Massage- Per 15 Min      01457 (CPT®) Hc Pt Iontophoresis Ea 15 Min      03931 (CPT®) Hc Pt Elec Stim Ea-Per 15 Min      79328 (CPT®) Hc Pt Ultrasound Ea 15 Min      59287 (CPT®) Hc Pt Self Care/Mgmt/Train Ea 15 Min      65471 (CPT®) Hc Pt Prosthetic (S) Train Initial Encounter, Each 15 Min      84923 (CPT®) Hc Orthotic(S) Mgmt/Train Initial Encounter, Each 15min      85733 (CPT®) Hc Pt Aquatic Therapy Ea 15 Min      74288 (CPT®) Hc Pt Orthotic(S)/Prosthetic(S) Encounter, Each 15 Min       (CPT®) Hc Pt Electrical Stim Unattended 10 1    Total  10 1        Therapy Charges for Today     Code Description Service Date Service Provider  Modifiers Qty    93417023001 HC PT ELECTRICAL STIM UNATTENDED 3/6/2019 Magdalena Aguero, PT  1    30076424349 HC PT THER PROC EA 15 MIN 3/6/2019 Magdalena Aguero, PT GP 3                  Magdalena Aguero, PT  3/6/2019

## 2019-03-13 ENCOUNTER — APPOINTMENT (OUTPATIENT)
Dept: PHYSICAL THERAPY | Facility: HOSPITAL | Age: 74
End: 2019-03-13

## 2019-03-27 ENCOUNTER — HOSPITAL ENCOUNTER (OUTPATIENT)
Dept: PHYSICAL THERAPY | Facility: HOSPITAL | Age: 74
Setting detail: THERAPIES SERIES
Discharge: HOME | End: 2019-03-27

## 2019-03-27 ENCOUNTER — OFFICE VISIT (OUTPATIENT)
Dept: OBSTETRICS AND GYNECOLOGY | Facility: CLINIC | Age: 74
End: 2019-03-27

## 2019-03-27 VITALS
DIASTOLIC BLOOD PRESSURE: 88 MMHG | SYSTOLIC BLOOD PRESSURE: 158 MMHG | BODY MASS INDEX: 32.9 KG/M2 | HEIGHT: 62 IN | WEIGHT: 178.8 LBS

## 2019-03-27 DIAGNOSIS — N81.84 PELVIC RELAXATION DUE TO PELVIC MUSCLE WASTING: Primary | ICD-10-CM

## 2019-03-27 DIAGNOSIS — N81.2 CYSTOCELE AND RECTOCELE WITH INCOMPLETE UTEROVAGINAL PROLAPSE: ICD-10-CM

## 2019-03-27 DIAGNOSIS — Z46.89 ENCOUNTER FOR FITTING AND ADJUSTMENT OF PESSARY: Primary | ICD-10-CM

## 2019-03-27 PROCEDURE — A4561 PESSARY RUBBER, ANY TYPE: HCPCS | Performed by: NURSE PRACTITIONER

## 2019-03-27 PROCEDURE — 99213 OFFICE O/P EST LOW 20 MIN: CPT | Performed by: NURSE PRACTITIONER

## 2019-03-27 PROCEDURE — G0283 ELEC STIM OTHER THAN WOUND: HCPCS | Performed by: PHYSICAL THERAPIST

## 2019-03-27 PROCEDURE — 97110 THERAPEUTIC EXERCISES: CPT | Performed by: PHYSICAL THERAPIST

## 2019-03-27 PROCEDURE — 57160 INSERT PESSARY/OTHER DEVICE: CPT | Performed by: NURSE PRACTITIONER

## 2019-03-27 RX ORDER — LOSARTAN POTASSIUM 100 MG/1
100 TABLET ORAL DAILY
COMMUNITY

## 2019-03-27 RX ORDER — PHENOL 1.4 %
600 AEROSOL, SPRAY (ML) MUCOUS MEMBRANE 2 TIMES DAILY WITH MEALS
COMMUNITY

## 2019-03-27 NOTE — THERAPY PROGRESS REPORT/RE-CERT
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 Cystocele and rectocele with incomplete uterovaginal prolapse N81.2 618.2           Pelvic Health     Row Name 19 1500             Subjective Comments    Subjective Comments  Got pessary in this date.  Not sure if it is still in or not.  Can't tell much from support.  Everything feels wierd for sure. Exercises are intermittently hard at times.  Trying to be more conscious of tightening muscles with movements.  No leakage of bladder to note.    -SW         Pregnancy Questions    Number of Pregnancies  4  -SW      Number of Children  3  -SW      Type of Previous Deliveries  Vaginal  -SW         Subjective Pain    Able to rate subjective pain?  yes  -SW      Pre-Treatment Pain Level  0  -SW         Pelvic Floor Muscle    Strength (Right)  3: Squeeze with/without lift  -SW      Strength (Left)  3: Squeeze with/without lift  -SW       Symmetry of Sustained Maximal Contraction  Symmetrical  -SW      Endurance (Ability to Hold Maximal Contraction)  5 sec  -SW      # of Reps of Maximal Contractions while Maintaining Endurance and Strength  5 sets  -SW      Fast Contraction (# of 1 sec contractions performed)  10 rest in between set; after 5, slight drop; rest given  -SW      Interior Muscle Comments  Pessary remained in vagina upon initiation of exam.  But when patient moved to supine position, pessary dislodged to come out of vagina.  Remainder removed for duration of treatment. urethral carnucle noted upon examination.   -SW         SEMG Evaluation    Pelvic Floor Electrode  Internal Vaginal  -SW      Baseline Resting  Yes  -SW      SEMG Evaluation Comments  normal resting tone. Able to achieve work tone of 3.2 or slightly higher with QF contractions.  Endurance 5 sec x 5 reps.   -         Education Provided On:    HEP Comments  continue with semireclined uptraining to PF.   -SW        User Key  (r) = Recorded By, (t) = Taken By, (c) = Cosigned By    Initials Name Provider Type    Magdalena Bunch, PT Physical Therapist        PT Ortho     Row Name 03/27/19 1500       Posture/Observations    Posture/Observations Comments  Patient somewhat distressed.  Pale in color.    -      User Key  (r) = Recorded By, (t) = Taken By, (c) = Cosigned By    Initials Name Provider Type    Magdalena Bunch, PT Physical Therapist                     PT Assessment/Plan     Row Name 03/27/19 1500          PT Assessment    Assessment Comments  Patient received pessary placement this date.  All new to the fit and not sure if she can tell any benefit this early on. Prior to treatment, post doffing clothing for examination and treatment, pessary was displaced and removed by patient.  She requested to discontinue use.  Said she could not sense a benefit or comfort with placement. Demonstrated fair recruitment this visit in supine position.  In seated, she  was unable to contract against gravity showing every so slight (.4mv or less) amplitude of recruitment.  Discontinued seated treatment and resumed supine treatment with stimulation.  Patient has atrophy to tissues and very poor support to vaginal canal.  -SW     Rehab Potential  Good  -SW     Patient/caregiver participated in establishment of treatment plan and goals  Yes  -SW     Patient would benefit from skilled therapy intervention  Yes  -SW        PT Plan    Predicted Duration of Therapy Intervention (Therapy Eval)  8 visits  -SW     PT Plan Comments  continue with stimulation.  Increase training in supine.   -SW       User Key  (r) = Recorded By, (t) = Taken By, (c) = Cosigned By    Initials Name Provider Type    Magdalena Bunch, PT Physical Therapist          Modalities     Row Name 03/27/19 1500             ELECTRICAL STIMULATION    Attended/Unattended  Unattended  -SW      Stimulation Type  -- NMES to PF  -SW      Max mAmp  -- 50 MHz; 15 min treatment; 5:10; intensity 12  -SW      Location/Electrode Placement/Other  -- intravaginal PF  -SW       PT E-Stim Unattended (Manual) Minutes  15  -SW        User Key  (r) = Recorded By, (t) = Taken By, (c) = Cosigned By    Initials Name Provider Type    Magdalena Bunch, PT Physical Therapist        Exercises     Row Name 03/27/19 1500             Subjective Comments    Subjective Comments  Got pessary in this date.  Not sure if it is still in or not.  Can't tell much from support.  Everything feels wierd for sure. Exercises are intermittently hard at times.  Trying to be more conscious of tightening muscles with movements.  No leakage of bladder to note.    -SW         Subjective Pain    Able to rate subjective pain?  yes  -SW      Pre-Treatment Pain Level  0  -SW         Total Minutes    18190 - PT Therapeutic Exercise Minutes  45  -SW         Exercise 1    Exercise Name 1  supine SEMG  -SW      Additional Comments  normal resting posture  -SW          Exercise 2    Exercise Name 2  QF supine   -SW      Reps 2  10  -SW      Additional Comments  able to recruit PF with relaxation to baselines x 2/5.  Amplitudes 3.2 or slightly higher.    -SW         Exercise 3    Exercise Name 3  SEMG endurance  -SW      Sets 3  2  -SW      Reps 3  5  -SW      Time 3  5 sec  -SW      Additional Comments  Able to demo good stability at 5 sec hold with amplitudes at 3.2.  Full undelayed relaxation to follow.   -SW         Exercise 4    Exercise Name 4  seated resting  -SW      Additional Comments  normal resting tone achieved that remained consistent.   -SW         Exercise 5    Exercise Name 5  seated QF  -SW      Additional Comments  unable to change amplitudes from resting to contraction.  Possibly 0.4 inc in amplitude.    -SW        User Key  (r) = Recorded By, (t) = Taken By, (c) = Cosigned By    Initials Name Provider Type    Magdalena Bunch, PT Physical Therapist                      PT OP Goals     Row Name 03/27/19 1500          PT Short Term Goals    STG Date to Achieve  04/26/19  -     STG 1  patient to be independent with pelvic rest position x 20 minutes daily to provide gravity assist in prolapse reduction.   -SW     STG 1 Progress  Met  -     STG 2  patient to void every 3-4 hours during the daytime without strain required for evacuation.  -     STG 2 Progress  Met  -     STG 3  isolated PF contractions with amplitudes of 3.5 of > implying improved recruitment patterns.   -     STG 3 Progress  Ongoing;Progressing  -     STG 3 Progress Comments  amplitudes remain low despite uptraining and stimulation   -        Long Term Goals    LTG Date to Achieve  05/10/19  -SW     LTG 1  bladder void schedule of 3-4 hours  -     LTG 1 Progress  Progressing  -     LTG 2  no needs for straining to bowels for defectaion such that she suffers with constipation .  -     LTG 2 Progress  New  -     LTG 3  Be able to demo coordination of PF in seated  position against gravity to pull in 4+ mv or less.   -     LTG 3 Progress  New  -     LTG 3 Progress Comments  unable to show inc recruitment of PF in seated position.   -     LTG 4  achieve personal goals of dec progression of POP.   -     LTG 4 Progress  New  -     LTG 6  I with all aquatics.  -     LTG 6 Progress  Met  -     LTG 7  I with land final HEP.  -     LTG 7 Progress  Met  -     LTG 8  D/C with free 30 day fitness formula membership.  -     LTG 8 Progress  Met  -        Time Calculation    PT Goal Re-Cert Due Date  04/26/19  -       User Key  (r) = Recorded By, (t) = Taken By, (c) = Cosigned By    Initials Name Provider Type    Magdalena Bunch, PT Physical Therapist          Therapy Education  Given: HEP  Program: Reinforced  How Provided: Verbal, Demonstration  Provided to: Patient  Level of Understanding: Teach back education performed, Verbalized, Demonstrated               Time Calculation:   Start Time: 1503  Stop Time: 1602  Time Calculation (min): 59 min  Therapy Charges for Today     Code Description Service Date Service Provider Modifiers Qty    40472456045 HC PT THER PROC EA 15 MIN 3/27/2019 Magdalena Aguero, PT GP 3    78059986125 HC PT ELECTRICAL STIM UNATTENDED 3/27/2019 Magdalena Aguero, PT  1                  Magdalena Aguero, PT  3/27/2019

## 2019-03-27 NOTE — PROGRESS NOTES
"Subjective   Chief Complaint   Patient presents with   • pessary morro     Sophia Neff is a 73 y.o. year old who presents to wanting to have a pessary placed. She was seen on 2/11 and was noted to have a stage 2 cystocele and stage 3 rectocele but she opted to try pelvic floor PT first. After seeing Magdalena a few times she decided that she would like to try another pessary fitting.    No Additional Complaints Reported    The following portions of the patient's history were reviewed and updated as appropriate:problem list, current medications and allergies    Review of Systems   Constitutional: Negative for chills and fever.   Gastrointestinal: Negative for abdominal distention, abdominal pain, constipation and diarrhea.   Genitourinary: Positive for vaginal pain. Negative for urinary incontinence, decreased urine volume, difficulty urinating, dysuria, flank pain, frequency, pelvic pain, urgency, vaginal bleeding and vaginal discharge.        Objective   /88   Ht 157.5 cm (62\")   Wt 81.1 kg (178 lb 12.8 oz)   BMI 32.70 kg/m²     General:  well developed; well nourished  no acute distress  appears older than stated age  obese - Body mass index is 32.7 kg/m².   Pelvis: Clinical staff was present for exam  External genitalia:  normal appearance of the external genitalia including Bartholin's and Villas's glands.  :  urethral meatus normal; urethra hypermobile;  Vaginal:  atrophic mucosal changes are present;  Cervix:  absent.  Cystocele GRADE 2  Rectocele GRADE 3   Attempted to place a #2 donut but it was too large so it was removed. Then attempted to place a #1 ring with support; it seemed to fit well from left to right but did not fit under the pubic bone securely. She went to empty her bladder and it was expelled into the toilet. I then attempted to place a #0 ring with support and it was too small. I then placed a #2 ring with support and it was snug from left to right and snug under the pubic " bone but she didn't have any complaints with discomfort.      Lab Review   No data reviewed    Imaging   No data reviewed         Diagnoses and all orders for this visit:    Encounter for fitting and adjustment of pessary    Cystocele and rectocele with incomplete uterovaginal prolapse      She has an appointment with Magdalena immediately following this visit so we will see how well she is able to tolerate PT with the pessary in place.    ++ Following PT Magdalena reported to me that during her PT visit the pessary came out. She opted to not have it put back in at this time. She has tried a pessary once in the past and was never able to find the right fit and is ok with not having one now, she just thought it may be beneficial to try again.      No orders of the defined types were placed in this encounter.      This document was electronically signed.    GENARO Bragg  March 29, 2019

## 2019-04-09 ENCOUNTER — TELEPHONE (OUTPATIENT)
Dept: FAMILY MEDICINE CLINIC | Facility: CLINIC | Age: 74
End: 2019-04-09

## 2019-04-09 ENCOUNTER — OFFICE VISIT (OUTPATIENT)
Dept: ENDOCRINOLOGY | Facility: CLINIC | Age: 74
End: 2019-04-09

## 2019-04-09 VITALS
HEART RATE: 89 BPM | WEIGHT: 177.8 LBS | HEIGHT: 62 IN | BODY MASS INDEX: 32.72 KG/M2 | DIASTOLIC BLOOD PRESSURE: 70 MMHG | SYSTOLIC BLOOD PRESSURE: 138 MMHG

## 2019-04-09 DIAGNOSIS — E11.59 HYPERTENSION ASSOCIATED WITH DIABETES (HCC): ICD-10-CM

## 2019-04-09 DIAGNOSIS — E11.9 CONTROLLED TYPE 2 DIABETES MELLITUS WITHOUT COMPLICATION, WITHOUT LONG-TERM CURRENT USE OF INSULIN (HCC): Primary | ICD-10-CM

## 2019-04-09 DIAGNOSIS — I15.2 HYPERTENSION ASSOCIATED WITH DIABETES (HCC): ICD-10-CM

## 2019-04-09 LAB — GLUCOSE BLDC GLUCOMTR-MCNC: 138 MG/DL (ref 70–130)

## 2019-04-09 PROCEDURE — 82962 GLUCOSE BLOOD TEST: CPT | Performed by: INTERNAL MEDICINE

## 2019-04-09 PROCEDURE — 99204 OFFICE O/P NEW MOD 45 MIN: CPT | Performed by: INTERNAL MEDICINE

## 2019-04-09 NOTE — TELEPHONE ENCOUNTER
Pt would like a call back concerning her A1C And glucose  result please    Do you have results for her?

## 2019-04-09 NOTE — PROGRESS NOTES
" Sophia Neff is a 73 y.o. female who presents for  evaluation of   Chief Complaint   Patient presents with   • Diabetes       Referring provider    Jah Whitaker MD  [facility] [address]    Primary Care Provider    Jah Whitaker MD    Duration 10 years    Timing - Diabetes is Constant    Quality -  Well controlled    Severity -  moderate    Complications - CKD stage III    Current symptoms/problems  none     Alleviating Factors: Compliance       Side Effects  Metformin     Januvia too weak     Current diet  in general, a \"healthy\" diet      Current exercise walking    Current monitoring regimen: home blood tests - checking 1 x daily   Home blood sugar records:     Hypoglycemia if skipped meals     Past Medical History:   Diagnosis Date   • Anemia    • Arthritis    • Bell's palsy    • Diabetes mellitus (CMS/HCC)    • Fibromyalgia    • High cholesterol    • Hypertension    • 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    • Plantar fasciitis     Bilateral     Family History   Problem Relation Age of Onset   • Diabetes Sister    • Hyperlipidemia Sister    • Arthritis Sister    • Diabetes Brother    • Cancer Brother    • Cancer Paternal Aunt    • Depression Paternal Grandfather      Social History     Tobacco Use   • Smoking status: Former Smoker     Last attempt to quit: 1980     Years since quittin.2   • Smokeless tobacco: Never Used   Substance Use Topics   • Alcohol use: Yes     Comment: 6x per year   • Drug use: Defer         Current Outpatient Medications:   •  diclofenac (VOLTAREN) 1 % gel gel, Apply 4 g topically to the appropriate area as directed 2 (Two) Times a Day., Disp: , Rfl:   •  TRULICITY 1.5 MG/0.5ML solution pen-injector, 1.5 mg 1 (One) Time Per Week., Disp: , Rfl:   •  acetaminophen (TYLENOL) 325 MG tablet, Tylenol 325 mg tablet  Take 2 by mouth, Disp: , Rfl:   •  calcium carbonate (OS-JESICA) 600 MG tablet, Take 600 mg by mouth 2 (Two) " Times a Day With Meals., Disp: , Rfl:   •  docusate sodium (COLACE) 100 MG capsule, 100 mg 3 (Three) Times a Day., Disp: , Rfl:   •  FREESTYLE LITE test strip, , Disp: , Rfl:   •  hydrALAZINE (APRESOLINE) 10 MG tablet, 10 mg 3 (Three) Times a Day., Disp: , Rfl:   •  Lancets (FREESTYLE) lancets, , Disp: , Rfl:   •  loratadine (CLARITIN) 10 MG tablet, loratadine 10 mg tablet  Take 1 by mouth, Disp: , Rfl:   •  losartan (COZAAR) 100 MG tablet, Take 100 mg by mouth Daily., Disp: , Rfl:   •  Multiple Vitamins-Minerals (ONE-A-DAY WOMENS 50+ ADVANTAGE PO), One-A-Day Womens Formula 18 mg iron-400 mcg-500 mg tablet  Take 1 by mouth once a day, Disp: , Rfl:   •  RESTASIS 0.05 % ophthalmic emulsion, , Disp: , Rfl:   •  triamcinolone (KENALOG) 0.1 % ointment, triamcinolone acetonide 0.1 % topical ointment, Disp: , Rfl:     Review of Systems    Review of Systems   Constitutional: Negative for activity change, appetite change, chills, diaphoresis, fatigue, fever and unexpected weight change.   HENT: Negative for congestion, dental problem, drooling, ear discharge, ear pain, facial swelling, mouth sores, postnasal drip, rhinorrhea, sinus pressure, sore throat, tinnitus, trouble swallowing and voice change.    Eyes: Negative for photophobia, pain, discharge, redness, itching and visual disturbance.   Respiratory: Negative for apnea, cough, choking, chest tightness, shortness of breath, wheezing and stridor.    Cardiovascular: Negative for chest pain, palpitations and leg swelling.   Gastrointestinal: Negative for abdominal distention, abdominal pain, constipation, diarrhea, nausea and vomiting.   Endocrine: Negative for cold intolerance, heat intolerance, polydipsia, polyphagia and polyuria.   Genitourinary: Negative for decreased urine volume, difficulty urinating, dysuria, flank pain, frequency, hematuria and urgency.   Musculoskeletal: Negative for arthralgias, back pain, gait problem, joint swelling, myalgias, neck pain and  "neck stiffness.   Skin: Negative for color change, pallor, rash and wound.   Allergic/Immunologic: Negative for immunocompromised state.   Neurological: Negative for dizziness, tremors, seizures, syncope, facial asymmetry, speech difficulty, weakness, light-headedness, numbness and headaches.   Hematological: Negative for adenopathy.   Psychiatric/Behavioral: Negative for agitation, behavioral problems, confusion, decreased concentration, dysphoric mood, hallucinations, self-injury, sleep disturbance and suicidal ideas. The patient is not nervous/anxious and is not hyperactive.         Objective:   /70 (BP Location: Right arm)   Pulse 89   Ht 157.5 cm (62\")   Wt 80.6 kg (177 lb 12.8 oz)   BMI 32.52 kg/m²     Physical Exam   Constitutional: She is oriented to person, place, and time. She appears well-developed.   HENT:   Head: Normocephalic.   Right Ear: External ear normal.   Left Ear: External ear normal.   Nose: Nose normal.   Eyes: Conjunctivae and EOM are normal. No scleral icterus.   Neck: Normal range of motion. Neck supple. No tracheal deviation present. No thyromegaly present.   Cardiovascular: Normal rate, regular rhythm, normal heart sounds and intact distal pulses. Exam reveals no gallop and no friction rub.   No murmur heard.  Pulmonary/Chest: Effort normal and breath sounds normal. No stridor. No respiratory distress. She has no wheezes. She has no rales. She exhibits no tenderness.   Abdominal: Soft. Bowel sounds are normal. She exhibits no distension and no mass. There is no tenderness. There is no rebound and no guarding.   Musculoskeletal: Normal range of motion. She exhibits no tenderness or deformity.   Lymphadenopathy:     She has no cervical adenopathy.   Neurological: She is alert and oriented to person, place, and time. She displays normal reflexes. She exhibits normal muscle tone. Coordination normal.   Skin: No rash noted. No erythema. No pallor.   Psychiatric: She has a normal " mood and affect. Her behavior is normal. Judgment and thought content normal.       Lab Review    Results for orders placed or performed in visit on 04/09/19   POC Glucose Fingerstick   Result Value Ref Range    Glucose 138 (A) 70 - 130 mg/dL         Assessment/Plan       ICD-10-CM ICD-9-CM   1. Controlled type 2 diabetes mellitus without complication, without long-term current use of insulin (CMS/[facility]) E11.9 250.00   2. Hypertension associated with diabetes (CMS/[facility]) E11.59 250.80    I10 401.9         I reviewed and summarized records from Jah Whitaker MD from current year  and I reviewed / ordered labs.   From review of records :    Pt has type 2 diabetes presently on glipizide and trulicity    And having hypoglycemia     Aic 5.7 but iron def anemia but BG log at goal with hypoglycemia     Glycemic Management:   No results found for: HGBA1C  No results found for: GLUCOSE, BUN, CREATININE, EGFRIFNONA, EGFRIFAFRI, BCR, K, CO2, CALCIUM, PROTENTOTREF, ALBUMIN, LABIL2, AST, ALT, ANIONGAP  No results found for: WBC, HGB, HCT, MCV, PLT    Keep trulicity     Stop glipizide     Lipid Management  No results found for: CHOL  No results found for: TRIG  No results found for: HDL  No components found for: LDLCALC  No results found for: LDL  No results found for: LDLDIRECT    Not on statin   Consider starting       Blood Pressure Management:    Vitals:    04/09/19 0913   BP: 138/70   Pulse: 89     No results found for: GLUCOSE, CALCIUM, NA, K, CO2, CL, BUN, CREATININE, EGFRIFAFRI, EGFRIFNONA, BCR, ANIONGAP        Nl bp     Microvascular Complication Monitoring:      Eye Exam Evaluation  Within 1 year   -----------    Last Microalbumin-Proteinuria Assessment    No results found for: MALBCRERATIO    No results found for: UTPCR    -----------      Neuropathy, none            Weight Related:   Wt Readings from Last 3 Encounters:   04/09/19 80.6 kg (177 lb 12.8 oz)   03/27/19 81.1 kg (178 lb 12.8 oz)   02/11/19 78.9 kg (174 lb)      Body mass index is 32.52 kg/m².        Diet interventions: moderate (500 kCal/d) deficit diet.      Bone Health    No results found for: PTH, CALCIUM, CAION, PHOS, [id number]    Thyroid Health    No results found for: TSH         Other Diabetes Related Aspects       No results found for: [id number]         Orders Placed This Encounter   Procedures   • POC Glucose Fingerstick         A copy of my note was sent to Jah Whitaker MD    Please see my above opinion and suggestions.

## 2019-04-17 ENCOUNTER — HOSPITAL ENCOUNTER (OUTPATIENT)
Dept: PHYSICAL THERAPY | Facility: HOSPITAL | Age: 74
Setting detail: THERAPIES SERIES
Discharge: HOME | End: 2019-04-17

## 2019-04-17 DIAGNOSIS — N81.84 PELVIC RELAXATION DUE TO PELVIC MUSCLE WASTING: Primary | ICD-10-CM

## 2019-04-17 DIAGNOSIS — N81.2 CYSTOCELE AND RECTOCELE WITH INCOMPLETE UTEROVAGINAL PROLAPSE: ICD-10-CM

## 2019-04-17 PROCEDURE — 97535 SELF CARE MNGMENT TRAINING: CPT | Performed by: PHYSICAL THERAPIST

## 2019-04-17 NOTE — THERAPY PROGRESS REPORT/RE-CERT
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 Cystocele and rectocele with incomplete uterovaginal prolapse N81.2 618.2           Pelvic Health     Row Name 19 1500             Pelvic Floor Muscle    Interior Muscle Comments  Patient declined assessment. No longer using pessary as it would not stay in place.    -         SEMG Evaluation    SEMG Evaluation Comments  declined  -         Education Provided On:    HEP Comments  continuation of HEP as assigned.   -        User Key  (r) = Recorded By, (t) = Taken By, (c) = Cosigned By    Initials Name Provider Type    Magdalena Bunch, PT Physical Therapist        PT Ortho     Row Name 19 1500       Subjective Comments    Subjective Comments  Not having any luck with exercise at home.  Not the same as it is here.  Doesn't feel that it is working.  Feels that she can contract muscles for a few seconds.  Just  hasn't seen the change in prolapse.  Surgery worked before and feels that she needs it again.  Believes that surgery is faster and will give the result she desires.  She denies any UI.  Patient has tried a pessary without any luck.  Would consider vaginal stimulation for home but doesn't feel that will fix her problem.  Patient requests to hold PT at this time in order for her to FU with referral for discussion of options.    -SW       Subjective Pain    Able to rate subjective pain?  yes  -SW    Pre-Treatment Pain Level  0  -SW    Post-Treatment Pain Level  0  -SW       Posture/Observations    Posture/Observations Comments  Patient in no apparent distress.  Requests no evaluation this date.  See assessment comments.   -      User Key  (r) = Recorded By, (t) = Taken By, (c) = Cosigned By    Initials Name Provider Type    Magdalena Bunch, PT Physical Therapist                     PT Assessment/Plan     Row Name 04/17/19 9370          PT Assessment    Assessment Comments  Patient reports to therapy this date requesting to place therapy on hold.  She does not feel that it is helping as she continues to show POP without approximation.  She has previously had sx and desires sx again as she feels it is the only way to resolve her problems.  She denies having UI associated with POP.  Denies pain and only reports feeling of pressure with standing prolonged periods.  Patient understands HEp but feels she does the best after having stimulation to PF.  Patient may be a candidate for home stimulation unit if approved by insurance and with given consideration of referral's recommendations.   -SW     Rehab Potential  Good  -     Patient/caregiver participated in establishment of treatment plan and goals  Yes  -SW     Patient would benefit from skilled therapy intervention  Yes  -SW        PT Plan    PT Frequency  -- Hold PT for now  -     PT Plan Comments  Patient to FU with Katlin Milner for consideration to referral  for surgery in addition to home stimulation unit to assist with strengthening of muscles to PF.  Return to PT as requested by MD post consultation.    -       User Key  (r) = Recorded By, (t) = Taken By, (c) = Cosigned By    Initials Name Provider Type    Magdalena Bunch, PT Physical Therapist            Exercises     Row Name 04/17/19 1500             Subjective Comments    Subjective Comments  Not having any luck with exercise at home.  Not the same as it is here.  Doesn't feel that it is working.  Feels that she can contract muscles for a few seconds.  Just hasn't seen the change in prolapse.  Surgery worked before and feels that she needs it again.  Believes that surgery is faster and will give the result she desires.  She denies any UI.  Patient has tried a pessary without any luck.  Would consider vaginal stimulation for home but doesn't feel that will fix her problem.  Patient requests to hold PT at this time in order for her to FU with referral for discussion of options.    -SW         Subjective Pain    Able to rate subjective pain?  yes  -SW      Pre-Treatment Pain Level  0  -SW      Post-Treatment Pain Level  0  -SW         Exercise 1    Exercise Name 1  Verbalized review of HEp only.  -        User Key  (r) = Recorded By, (t) = Taken By, (c) = Cosigned By    Initials Name Provider Type    Magdalena Bunch PT Physical Therapist                      PT OP Goals     Row Name 04/17/19 1700 04/17/19 1544       PT Short Term Goals    STG Date to Achieve  05/14/19  -SW  --    STG 1  patient to be independent with pelvic rest position x 20 minutes daily to provide gravity assist in prolapse reduction.   -SW  --    STG 1 Progress  Met  -SW  --    STG 2  patient to void every 3-4 hours during the daytime without strain required for evacuation.  -SW  --    STG 2 Progress  Met  -SW  --    STG 3  isolated PF contractions with amplitudes of 3.5 of > implying improved recruitment patterns.   -SW  --     STG 3 Progress  Ongoing;Progressing  -SW  --       Long Term Goals    LTG Date to Achieve  05/14/19  -SW  --    LTG 1  bladder void schedule of 3-4 hours  -SW  --    LTG 1 Progress  Progressing  -SW  --    LTG 2  no needs for straining to bowels for defectaion such that she suffers with constipation .  -SW  --    LTG 2 Progress  Progressing  -SW  --    LTG 3  Be able to demo coordination of PF in seated position against gravity to pull in 4+ mv or less.   -SW  --    LTG 3 Progress  New  -SW  --    LTG 4  achieve personal goals of dec progression of POP.   -SW  --    LTG 4 Progress  New  -  --       Time Calculation    PT Goal Re-Cert Due Date  05/14/19  -SW  04/17/19  -SW    Row Name 04/17/19 1500          PT Short Term Goals    STG Date to Achieve  04/26/19  -     STG 1  patient to be independent with pelvic rest position x 20 minutes daily to provide gravity assist in prolapse reduction.   -SW     STG 1 Progress  Met  -     STG 2  patient to void every 3-4 hours during the daytime without strain required for evacuation.  -     STG 2 Progress  Met  -     STG 3  isolated PF contractions with amplitudes of 3.5 of > implying improved recruitment patterns.   -     STG 3 Progress  Ongoing;Progressing  -        Long Term Goals    LTG Date to Achieve  05/10/19  -     LTG 1  bladder void schedule of 3-4 hours  -     LTG 1 Progress  Progressing  -     LTG 2  no needs for straining to bowels for defectaion such that she suffers with constipation .  -     LTG 2 Progress  New  -     LTG 3  Be able to demo coordination of PF in seated position against gravity to pull in 4+ mv or less.   -SW     LTG 3 Progress  New  -     LTG 4  achieve personal goals of dec progression of POP.   -     LTG 4 Progress  New  -     LTG 6  I with all aquatics.  -     LTG 6 Progress  Met  -     LTG 7  I with land final HEP.  -     LTG 7 Progress  Met  -     LTG 8  D/C with free 30 day fitness formula  membership.  -     LT 8 Progress  Met  -       User Key  (r) = Recorded By, (t) = Taken By, (c) = Cosigned By    Initials Name Provider Type    Magdalena Bunch PT Physical Therapist          Therapy Education  Given: HEP, Symptoms/condition management  Program: Reinforced  How Provided: Verbal  Provided to: Patient  Level of Understanding: Verbalized, Teach back education performed               Time Calculation:   Start Time: 1519  Stop Time: 1538  Time Calculation (min): 19 min  Therapy Charges for Today     Code Description Service Date Service Provider Modifiers Qty    34797575604  PT SELF CARE/MGMT/TRAIN EA 15 MIN 4/17/2019 Magdalena Aguero, PT GP 1    25219328278 HC PT THER SUPP EA 15 MIN 4/17/2019 Magdalena Aguero, PT GP 1                  Magdalena Aguero PT  4/17/2019

## 2019-04-22 ENCOUNTER — OFFICE VISIT (OUTPATIENT)
Dept: OBSTETRICS AND GYNECOLOGY | Facility: CLINIC | Age: 74
End: 2019-04-22

## 2019-04-22 VITALS
HEIGHT: 62 IN | BODY MASS INDEX: 31.83 KG/M2 | SYSTOLIC BLOOD PRESSURE: 142 MMHG | DIASTOLIC BLOOD PRESSURE: 81 MMHG | WEIGHT: 173 LBS

## 2019-04-22 DIAGNOSIS — N81.2 CYSTOCELE AND RECTOCELE WITH INCOMPLETE UTEROVAGINAL PROLAPSE: Primary | ICD-10-CM

## 2019-04-22 PROBLEM — Z46.89 ENCOUNTER FOR FITTING AND ADJUSTMENT OF PESSARY: Status: RESOLVED | Noted: 2019-03-27 | Resolved: 2019-04-22

## 2019-04-22 PROCEDURE — 99212 OFFICE O/P EST SF 10 MIN: CPT | Performed by: NURSE PRACTITIONER

## 2019-04-22 NOTE — PROGRESS NOTES
"Subjective   Sophia Tali Neff is a 73 y.o. female. Referred back here by Mrs. Magdalena Aguero, PT by request of the patient because she feels like the physical therapy is not working.     \"There's a hole there and the bladder is going to fall out of it and the therapy isn't helping to close that hole.\"    Spoke with Magdalena previously and she stated that the patient was doing well with therapy but seemed to be more responsive to the stem unit. She needs to be able to continue doing therapy at home and would greatly benefit from a stem unit at home.    Ms. Neff denies any issues with urinary incontinence or vaginal pain. Just states that it \"feels uncomfortable\".      Other   This is a recurrent problem. The current episode started more than 1 month ago. The problem occurs daily. The problem has been unchanged. Nothing aggravates the symptoms. Treatments tried: PT and vaginal pessary. The treatment provided no relief.       The following portions of the patient's history were reviewed and updated as appropriate: allergies, current medications, past medical history, past social history, past surgical history and problem list.    Review of Systems   Genitourinary: Negative for decreased urine volume, difficulty urinating, dysuria, enuresis, frequency, pelvic pain, urgency, vaginal bleeding, vaginal discharge and vaginal pain.       Objective   Physical Exam   Constitutional: She is oriented to person, place, and time. She appears well-developed and well-nourished.   Cardiovascular: Normal rate.   Pulmonary/Chest: Effort normal.   Neurological: She is alert and oriented to person, place, and time.   Psychiatric: She has a normal mood and affect. Her behavior is normal.   Nursing note and vitals reviewed.      Assessment/Plan   Sophia was seen today for pessary check.    Diagnoses and all orders for this visit:    Cystocele and rectocele with incomplete uterovaginal prolapse  -     Ambulatory Referral to " Urology       Needs to continue PT at home and would benefit from a stem unit at home. Will send referral to Urology at [facility] per patient's request. She is still wanting surgery but she's had an A&P repair in the past with Clinton Sheppard and Elise approximately 10 years ago; not sure that this is even an option but will send referral.

## 2019-05-15 ENCOUNTER — DOCUMENTATION (OUTPATIENT)
Dept: PHYSICAL THERAPY | Facility: HOSPITAL | Age: 74
End: 2019-05-15

## 2019-05-15 DIAGNOSIS — N81.84 PELVIC RELAXATION DUE TO PELVIC MUSCLE WASTING: Primary | ICD-10-CM

## 2019-05-15 DIAGNOSIS — N81.2 CYSTOCELE AND RECTOCELE WITH INCOMPLETE UTEROVAGINAL PROLAPSE: ICD-10-CM

## 2019-06-12 NOTE — THERAPY DISCHARGE NOTE
Outpatient Physical Therapy Discharge Summary         Patient Name: Sophia Neff  : 1945  MRN: [id number]    Today's Date: 2019  AL date: 5/15/19    Visit Dx:    ICD-10-CM ICD-9-CM   1. 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 Cystocele and rectocele with incomplete uterovaginal prolapse N81.2 618.2           OP PT Discharge Summary  Date of Discharge: 05/15/19  Reason for Discharge: other (comment)(Patient requested consult with MD because she wanted sx to fix problem. She did not feel that pessary or therapy exercise will help correct problem. She wants a more permanent fix to problem.)  Outcomes Achieved: Patient able to partially acheive established goals, Refer to plan of care for updates on goals achieved  Discharge Destination: Home with home program  Discharge Instructions/Additional Comments: Verbalized understanding of HEP as directed with encouraged compliance as to better support bladder and/or fixation if applicable.  Consult at later date should she decide to return to treatment.       Time Calculation: 7576-8954                   Magdalena Aguero, PT  2019

## 2019-07-29 ENCOUNTER — TELEPHONE (OUTPATIENT)
Dept: FAMILY MEDICINE CLINIC | Facility: CLINIC | Age: 74
End: 2019-07-29

## 2019-08-12 ENCOUNTER — TELEPHONE (OUTPATIENT)
Dept: FAMILY MEDICINE CLINIC | Facility: CLINIC | Age: 74
End: 2019-08-12

## 2019-08-12 ENCOUNTER — OFFICE VISIT (OUTPATIENT)
Dept: ENDOCRINOLOGY | Facility: CLINIC | Age: 74
End: 2019-08-12

## 2019-08-12 VITALS
HEART RATE: 83 BPM | WEIGHT: 163.7 LBS | SYSTOLIC BLOOD PRESSURE: 132 MMHG | HEIGHT: 62 IN | BODY MASS INDEX: 30.12 KG/M2 | DIASTOLIC BLOOD PRESSURE: 80 MMHG | OXYGEN SATURATION: 98 %

## 2019-08-12 DIAGNOSIS — E11.59 HYPERTENSION ASSOCIATED WITH DIABETES (HCC): ICD-10-CM

## 2019-08-12 DIAGNOSIS — N18.2 CKD (CHRONIC KIDNEY DISEASE), STAGE II: ICD-10-CM

## 2019-08-12 DIAGNOSIS — E11.9 CONTROLLED TYPE 2 DIABETES MELLITUS WITHOUT COMPLICATION, WITHOUT LONG-TERM CURRENT USE OF INSULIN (HCC): Primary | ICD-10-CM

## 2019-08-12 DIAGNOSIS — I15.2 HYPERTENSION ASSOCIATED WITH DIABETES (HCC): ICD-10-CM

## 2019-08-12 PROBLEM — I10 ESSENTIAL HYPERTENSION: Status: ACTIVE | Noted: 2019-08-12

## 2019-08-12 LAB
GLUCOSE BLDC GLUCOMTR-MCNC: 150 MG/DL (ref 70–130)
HBA1C MFR BLD: 6.7 %

## 2019-08-12 PROCEDURE — 82962 GLUCOSE BLOOD TEST: CPT | Performed by: INTERNAL MEDICINE

## 2019-08-12 PROCEDURE — 83036 HEMOGLOBIN GLYCOSYLATED A1C: CPT | Performed by: INTERNAL MEDICINE

## 2019-08-12 PROCEDURE — 99214 OFFICE O/P EST MOD 30 MIN: CPT | Performed by: INTERNAL MEDICINE

## 2019-08-12 RX ORDER — DULAGLUTIDE 1.5 MG/.5ML
1.5 INJECTION, SOLUTION SUBCUTANEOUS WEEKLY
Qty: 4 PEN | Refills: 11 | Status: SHIPPED | OUTPATIENT
Start: 2019-08-12 | End: 2020-06-08 | Stop reason: SDUPTHER

## 2019-08-12 RX ORDER — CHLORTHALIDONE 25 MG/1
25 TABLET ORAL DAILY
COMMUNITY
End: 2020-06-08

## 2019-08-12 NOTE — PROGRESS NOTES
" Sophia Neff is a 74 y.o. female who presents for  evaluation of   Chief Complaint   Patient presents with   • Diabetes       Referring provider    No referring provider defined for this encounter.    Primary Care Provider    Jah Whitaker MD    Duration 10 years    Timing - Diabetes is Constant    Quality -  Well controlled    Severity -  moderate    Complications - CKD stage III    Current symptoms/problems  none     Alleviating Factors: Compliance       Side Effects  Metformin     Januvia too weak     Current diet  in general, a \"healthy\" diet      Current exercise walking    Current monitoring regimen: home blood tests - checking 1 x daily   Home blood sugar records:     Hypoglycemia if skipped meals     Past Medical History:   Diagnosis Date   • Anemia    • Arthritis    • Bell's palsy    • Controlled type 2 diabetes mellitus without complication, without long-term current use of insulin (CMS/[facility]) 2019   • Diabetes mellitus (CMS/[facility])    • Fibromyalgia    • High cholesterol    • Hypertension    • 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    • Plantar fasciitis     Bilateral     Family History   Problem Relation Age of Onset   • Diabetes Sister    • Hyperlipidemia Sister    • Arthritis Sister    • Diabetes Brother    • Cancer Brother    • Cancer Paternal Aunt    • Depression Paternal Grandfather      Social History     Tobacco Use   • Smoking status: Former Smoker     Last attempt to quit: 1980     Years since quittin.6   • Smokeless tobacco: Never Used   Substance Use Topics   • Alcohol use: Yes     Comment: 6x per year   • Drug use: Defer         Current Outpatient Medications:   •  acetaminophen (TYLENOL) 325 MG tablet, Tylenol 325 mg tablet  Take 2 by mouth, Disp: , Rfl:   •  calcium carbonate (OS-JESICA) 600 MG tablet, Take 600 mg by mouth 2 (Two) Times a Day With Meals., Disp: , Rfl:   •  diclofenac (VOLTAREN) 1 % gel gel, Apply 4 g topically to the appropriate area as " directed 2 (Two) Times a Day., Disp: , Rfl:   •  docusate sodium (COLACE) 100 MG capsule, 100 mg 3 (Three) Times a Day., Disp: , Rfl:   •  FREESTYLE LITE test strip, , Disp: , Rfl:   •  hydrALAZINE (APRESOLINE) 10 MG tablet, 10 mg 3 (Three) Times a Day., Disp: , Rfl:   •  Lancets (FREESTYLE) lancets, , Disp: , Rfl:   •  loratadine (CLARITIN) 10 MG tablet, loratadine 10 mg tablet  Take 1 by mouth, Disp: , Rfl:   •  losartan (COZAAR) 100 MG tablet, Take 100 mg by mouth Daily., Disp: , Rfl:   •  Multiple Vitamins-Minerals (ONE-A-DAY WOMENS 50+ ADVANTAGE PO), One-A-Day Womens Formula 18 mg iron-400 mcg-500 mg tablet  Take 1 by mouth once a day, Disp: , Rfl:   •  RESTASIS 0.05 % ophthalmic emulsion, , Disp: , Rfl:   •  triamcinolone (KENALOG) 0.1 % ointment, triamcinolone acetonide 0.1 % topical ointment, Disp: , Rfl:   •  TRULICITY 1.5 MG/0.5ML solution pen-injector, 1.5 mg 1 (One) Time Per Week., Disp: , Rfl:     Review of Systems    Review of Systems   Constitutional: Negative for activity change, appetite change, chills, diaphoresis, fatigue, fever and unexpected weight change.   HENT: Negative for congestion, dental problem, drooling, ear discharge, ear pain, facial swelling, mouth sores, postnasal drip, rhinorrhea, sinus pressure, sore throat, tinnitus, trouble swallowing and voice change.    Eyes: Negative for photophobia, pain, discharge, redness, itching and visual disturbance.   Respiratory: Negative for apnea, cough, choking, chest tightness, shortness of breath, wheezing and stridor.    Cardiovascular: Negative for chest pain, palpitations and leg swelling.   Gastrointestinal: Negative for abdominal distention, abdominal pain, constipation, diarrhea, nausea and vomiting.   Endocrine: Negative for cold intolerance, heat intolerance, polydipsia, polyphagia and polyuria.   Genitourinary: Negative for decreased urine volume, difficulty urinating, dysuria, flank pain, frequency, hematuria and urgency.  "  Musculoskeletal: Negative for arthralgias, back pain, gait problem, joint swelling, myalgias, neck pain and neck stiffness.   Skin: Negative for color change, pallor, rash and wound.   Allergic/Immunologic: Negative for immunocompromised state.   Neurological: Negative for dizziness, tremors, seizures, syncope, facial asymmetry, speech difficulty, weakness, light-headedness, numbness and headaches.   Hematological: Negative for adenopathy.   Psychiatric/Behavioral: Negative for agitation, behavioral problems, confusion, decreased concentration, dysphoric mood, hallucinations, self-injury, sleep disturbance and suicidal ideas. The patient is not nervous/anxious and is not hyperactive.         Objective:   /80   Pulse 83   Ht 157.5 cm (62\")   Wt 74.3 kg (163 lb 11.2 oz)   SpO2 98%   BMI 29.94 kg/m²     Physical Exam   Constitutional: She is oriented to person, place, and time. She appears well-developed.   HENT:   Head: Normocephalic.   Right Ear: External ear normal.   Left Ear: External ear normal.   Nose: Nose normal.   Eyes: Conjunctivae and EOM are normal. No scleral icterus.   Neck: Normal range of motion. Neck supple. No tracheal deviation present. No thyromegaly present.   Cardiovascular: Normal rate, regular rhythm, normal heart sounds and intact distal pulses. Exam reveals no gallop and no friction rub.   No murmur heard.  Pulmonary/Chest: Effort normal and breath sounds normal. No stridor. No respiratory distress. She has no wheezes. She has no rales. She exhibits no tenderness.   Abdominal: Soft. Bowel sounds are normal. She exhibits no distension and no mass. There is no tenderness. There is no rebound and no guarding.   Musculoskeletal: Normal range of motion. She exhibits no tenderness or deformity.   Lymphadenopathy:     She has no cervical adenopathy.   Neurological: She is alert and oriented to person, place, and time. She displays normal reflexes. She exhibits normal muscle tone. " Coordination normal.   Skin: No rash noted. No erythema. No pallor.   Psychiatric: She has a normal mood and affect. Her behavior is normal. Judgment and thought content normal.       Lab Review    Results for orders placed or performed in visit on 08/12/19   POC Glucose   Result Value Ref Range    Glucose 150 (A) 70 - 130 mg/dL   POC Glycosylated Hemoglobin (Hb A1C)   Result Value Ref Range    Hemoglobin A1C 6.7 %         Assessment/Plan       ICD-10-CM ICD-9-CM   1. Controlled type 2 diabetes mellitus without complication, without long-term current use of insulin (CMS/[facility]) E11.9 250.00   2. Hypertension associated with diabetes (CMS/[facility]) E11.59 250.80    I10 401.9         I reviewed and summarized records from Jah Whitaker MD from current year  and I reviewed / ordered labs.   From review of records :    Pt has type 2 diabetes and was having hypoglycemia   Glycemic Management:   Lab Results   Component Value Date    HGBA1C 6.7 08/12/2019     No results found for: GLUCOSE, BUN, CREATININE, EGFRIFNONA, EGFRIFAFRI, BCR, K, CO2, CALCIUM, PROTENTOTREF, ALBUMIN, LABIL2, AST, ALT, ANIONGAP  No results found for: WBC, HGB, HCT, MCV, PLT    Keep trulicity     I stopped glipizide in the past    I am adding invokana 100 mg daily since based on [employer] data a pt with CKD stage II to III with proteinuria have improved renal and CV outcomes.  In addition she has increased fasting glucose , above 130         Lipid Management  No results found for: CHOL  No results found for: TRIG  No results found for: HDL  No components found for: LDLCALC  No results found for: LDL  No results found for: LDLDIRECT    Not on statin   Prefers not to take      Blood Pressure Management:    Vitals:    08/12/19 1122   BP: 132/80   Pulse: 83   SpO2: 98%     No results found for: GLUCOSE, CALCIUM, NA, K, CO2, CL, BUN, CREATININE, EGFRIFAFRI, EGFRIFNONA, BCR, ANIONGAP        Nl bp on hydralazine and losartan    On chlorthalidone, stop and start  invokana     Microvascular Complication Monitoring:      Eye Exam Evaluation  Within 1 year   -----------    Last Microalbumin-Proteinuria Assessment    No results found for: MALBCRERATIO    No results found for: UTPCR    -----------      Neuropathy, none            Weight Related:   Wt Readings from Last 3 Encounters:   08/12/19 74.3 kg (163 lb 11.2 oz)   04/22/19 78.5 kg (173 lb)   04/09/19 80.6 kg (177 lb 12.8 oz)     Body mass index is 29.94 kg/m².        Diet interventions: moderate (500 kCal/d) deficit diet.      Bone Health    No results found for: PTH, CALCIUM, CAION, PHOS, [id number]    Thyroid Health    No results found for: TSH         Other Diabetes Related Aspects       No results found for: [id number]         Orders Placed This Encounter   Procedures   • POC Glucose   • POC Glycosylated Hemoglobin (Hb A1C)         A copy of my note was sent to Jah Whitaker MD    Please see my above opinion and suggestions.

## 2019-08-14 ENCOUNTER — TELEPHONE (OUTPATIENT)
Dept: ENDOCRINOLOGY | Facility: CLINIC | Age: 74
End: 2019-08-14

## 2019-08-22 ENCOUNTER — TELEPHONE (OUTPATIENT)
Dept: FAMILY MEDICINE CLINIC | Facility: CLINIC | Age: 74
End: 2019-08-22

## 2019-08-22 NOTE — TELEPHONE ENCOUNTER
Pt says her Invokana 100 has not been filled because they have not received a PA. She is out of the samples and wants to know what he wants her to do. Please call, thanks!

## 2019-08-22 NOTE — TELEPHONE ENCOUNTER
Pt says her Invokana 100 has not been filled because they have not received a PA. She is out of the samples and wants to know what he wants her to do. Please call, thanks!               Please check on this.

## 2019-09-03 ENCOUNTER — TELEPHONE (OUTPATIENT)
Dept: FAMILY MEDICINE CLINIC | Facility: CLINIC | Age: 74
End: 2019-09-03

## 2019-09-03 ENCOUNTER — TELEPHONE (OUTPATIENT)
Dept: ENDOCRINOLOGY | Facility: CLINIC | Age: 74
End: 2019-09-03

## 2019-09-03 NOTE — TELEPHONE ENCOUNTER
Pt needs her Trulicity sent to [facility] in [facility], phone # [phone]. Pharmacy will call pt when script arrives, thanks!

## 2019-11-18 ENCOUNTER — OFFICE VISIT (OUTPATIENT)
Dept: ENDOCRINOLOGY | Facility: CLINIC | Age: 74
End: 2019-11-18

## 2019-11-18 VITALS
OXYGEN SATURATION: 98 % | DIASTOLIC BLOOD PRESSURE: 74 MMHG | SYSTOLIC BLOOD PRESSURE: 128 MMHG | WEIGHT: 157.9 LBS | HEIGHT: 62 IN | BODY MASS INDEX: 29.06 KG/M2 | HEART RATE: 97 BPM

## 2019-11-18 DIAGNOSIS — E11.22 TYPE 2 DIABETES MELLITUS WITH STAGE 3 CHRONIC KIDNEY DISEASE, WITHOUT LONG-TERM CURRENT USE OF INSULIN (HCC): Primary | ICD-10-CM

## 2019-11-18 DIAGNOSIS — I15.2 HYPERTENSION ASSOCIATED WITH DIABETES (HCC): ICD-10-CM

## 2019-11-18 DIAGNOSIS — N18.2 CKD (CHRONIC KIDNEY DISEASE), STAGE II: ICD-10-CM

## 2019-11-18 DIAGNOSIS — N18.30 TYPE 2 DIABETES MELLITUS WITH STAGE 3 CHRONIC KIDNEY DISEASE, WITHOUT LONG-TERM CURRENT USE OF INSULIN (HCC): Primary | ICD-10-CM

## 2019-11-18 DIAGNOSIS — E11.59 HYPERTENSION ASSOCIATED WITH DIABETES (HCC): ICD-10-CM

## 2019-11-18 LAB — HBA1C MFR BLD: 5.9 %

## 2019-11-18 PROCEDURE — 99214 OFFICE O/P EST MOD 30 MIN: CPT | Performed by: INTERNAL MEDICINE

## 2019-11-18 RX ORDER — LANCETS 28 GAUGE
EACH MISCELLANEOUS
Qty: 300 EACH | Refills: 3 | Status: SHIPPED | OUTPATIENT
Start: 2019-11-18 | End: 2021-07-15

## 2019-11-18 RX ORDER — BLOOD-GLUCOSE METER
KIT MISCELLANEOUS
Qty: 300 EACH | Refills: 3 | Status: SHIPPED | OUTPATIENT
Start: 2019-11-18 | End: 2021-07-15

## 2019-11-18 NOTE — PROGRESS NOTES
" Sophia Neff is a 74 y.o. female who presents for  evaluation of   Chief Complaint   Patient presents with   • Diabetes       Referring provider    No referring provider defined for this encounter.    Primary Care Provider    Antonietta Singh MD    Duration 10 years    Timing - Diabetes is Constant    Quality -  Well controlled    Severity -  moderate    Complications - CKD stage III    Current symptoms/problems  none     Alleviating Factors: Compliance       Side Effects  Metformin     Januvia too weak     Current diet  in general, a \"healthy\" diet      Current exercise walking    Current monitoring regimen: home blood tests - checking 1 x daily   Home blood sugar records:     Hypoglycemia if skipped meals     Past Medical History:   Diagnosis Date   • Anemia    • Arthritis    • Bell's palsy    • Controlled type 2 diabetes mellitus without complication, without long-term current use of insulin (CMS/[facility]) 2019   • Diabetes mellitus (CMS/[facility])    • Fibromyalgia    • High cholesterol    • Hypertension    • 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    • Plantar fasciitis     Bilateral     Family History   Problem Relation Age of Onset   • Diabetes Sister    • Hyperlipidemia Sister    • Arthritis Sister    • Diabetes Brother    • Cancer Brother    • Cancer Paternal Aunt    • Depression Paternal Grandfather      Social History     Tobacco Use   • Smoking status: Former Smoker     Last attempt to quit: 1980     Years since quittin.9   • Smokeless tobacco: Never Used   Substance Use Topics   • Alcohol use: Yes     Comment: 6x per year   • Drug use: Defer         Current Outpatient Medications:   •  acetaminophen (TYLENOL) 325 MG tablet, Tylenol 325 mg tablet  Take 2 by mouth, Disp: , Rfl:   •  calcium carbonate (OS-JESICA) 600 MG tablet, Take 600 mg by mouth 2 (Two) Times a Day With Meals., Disp: , Rfl:   •  chlorthalidone (HYGROTON) 25 MG tablet, Take 25 mg by mouth Daily., " Disp: , Rfl:   •  diclofenac (VOLTAREN) 1 % gel gel, Apply 4 g topically to the appropriate area as directed 2 (Two) Times a Day., Disp: , Rfl:   •  docusate sodium (COLACE) 100 MG capsule, 100 mg 3 (Three) Times a Day., Disp: , Rfl:   •  Empagliflozin (JARDIANCE) 10 MG tablet, Take 10 mg by mouth Daily., Disp: 90 tablet, Rfl: 3  •  hydrALAZINE (APRESOLINE) 10 MG tablet, 10 mg 3 (Three) Times a Day., Disp: , Rfl:   •  loratadine (CLARITIN) 10 MG tablet, loratadine 10 mg tablet  Take 1 by mouth, Disp: , Rfl:   •  losartan (COZAAR) 100 MG tablet, Take 100 mg by mouth Daily., Disp: , Rfl:   •  Multiple Vitamins-Minerals (ONE-A-DAY WOMENS 50+ ADVANTAGE PO), One-A-Day Womens Formula 18 mg iron-400 mcg-500 mg tablet  Take 1 by mouth once a day, Disp: , Rfl:   •  RESTASIS 0.05 % ophthalmic emulsion, , Disp: , Rfl:   •  triamcinolone (KENALOG) 0.1 % ointment, triamcinolone acetonide 0.1 % topical ointment, Disp: , Rfl:   •  TRULICITY 1.5 MG/0.5ML solution pen-injector, Inject 1.5 mg under the skin into the appropriate area as directed 1 (One) Time Per Week., Disp: 4 pen, Rfl: 11  •  FREESTYLE LITE test strip, Use to test sugar 3 times daily, Disp: 300 each, Rfl: 3  •  Lancets (FREESTYLE) lancets, Use to test sugar 3 times daily, Disp: 300 each, Rfl: 3    Review of Systems    Review of Systems   Constitutional: Negative for activity change, appetite change, chills, diaphoresis, fatigue, fever and unexpected weight change.   HENT: Negative for congestion, dental problem, drooling, ear discharge, ear pain, facial swelling, mouth sores, postnasal drip, rhinorrhea, sinus pressure, sore throat, tinnitus, trouble swallowing and voice change.    Eyes: Negative for photophobia, pain, discharge, redness, itching and visual disturbance.   Respiratory: Negative for apnea, cough, choking, chest tightness, shortness of breath, wheezing and stridor.    Cardiovascular: Negative for chest pain, palpitations and leg swelling.  "  Gastrointestinal: Negative for abdominal distention, abdominal pain, constipation, diarrhea, nausea and vomiting.   Endocrine: Negative for cold intolerance, heat intolerance, polydipsia, polyphagia and polyuria.   Genitourinary: Negative for decreased urine volume, difficulty urinating, dysuria, flank pain, frequency, hematuria and urgency.   Musculoskeletal: Negative for arthralgias, back pain, gait problem, joint swelling, myalgias, neck pain and neck stiffness.   Skin: Negative for color change, pallor, rash and wound.   Allergic/Immunologic: Negative for immunocompromised state.   Neurological: Negative for dizziness, tremors, seizures, syncope, facial asymmetry, speech difficulty, weakness, light-headedness, numbness and headaches.   Hematological: Negative for adenopathy.   Psychiatric/Behavioral: Negative for agitation, behavioral problems, confusion, decreased concentration, dysphoric mood, hallucinations, self-injury, sleep disturbance and suicidal ideas. The patient is not nervous/anxious and is not hyperactive.         Objective:   /74   Pulse 97   Ht 157.5 cm (62\")   Wt 71.6 kg (157 lb 14.4 oz)   SpO2 98%   BMI 28.88 kg/m²     Physical Exam   Constitutional: She is oriented to person, place, and time. She appears well-developed.   HENT:   Head: Normocephalic.   Right Ear: External ear normal.   Left Ear: External ear normal.   Nose: Nose normal.   Eyes: Conjunctivae and EOM are normal. No scleral icterus.   Neck: Normal range of motion. Neck supple. No tracheal deviation present. No thyromegaly present.   Cardiovascular: Normal rate, regular rhythm, normal heart sounds and intact distal pulses. Exam reveals no gallop and no friction rub.   No murmur heard.  Pulmonary/Chest: Effort normal and breath sounds normal. No stridor. No respiratory distress. She has no wheezes. She has no rales. She exhibits no tenderness.   Abdominal: Soft. Bowel sounds are normal. She exhibits no distension and no " mass. There is no tenderness. There is no rebound and no guarding.   Musculoskeletal: Normal range of motion. She exhibits no tenderness or deformity.   Lymphadenopathy:     She has no cervical adenopathy.   Neurological: She is alert and oriented to person, place, and time. She displays normal reflexes. She exhibits normal muscle tone. Coordination normal.   Skin: No rash noted. No erythema. No pallor.   Psychiatric: She has a normal mood and affect. Her behavior is normal. Judgment and thought content normal.       Lab Review    Results for orders placed or performed in visit on 11/18/19   Hemoglobin A1c   Result Value Ref Range    Hemoglobin A1C 5.9          Assessment/Plan       ICD-10-CM ICD-9-CM   1. Type 2 diabetes mellitus with stage 3 chronic kidney disease, without long-term current use of insulin (CMS/[facility]) E11.22 250.40    N18.3 585.3   2. Hypertension associated with diabetes (CMS/[facility]) E11.59 250.80    I10 401.9   3. CKD (chronic kidney disease), stage II N18.2 585.2         I reviewed and summarized records from Antonietta Singh MD from current year  and I reviewed / ordered labs.   From review of records :    Pt has type 2 diabetes and was having hypoglycemia   Glycemic Management:   Lab Results   Component Value Date    HGBA1C 5.9 10/21/2019    HGBA1C 6.7 08/12/2019     No results found for: GLUCOSE, BUN, CREATININE, EGFRIFNONA, EGFRIFAFRI, BCR, K, CO2, CALCIUM, PROTENTOTREF, ALBUMIN, LABIL2, AST, ALT, ANIONGAP  No results found for: WBC, HGB, HCT, MCV, PLT    trulicity and jardiance ( I wanted invokana due to [employer] but insurance paid for jardiance )      Lipid Management  No results found for: CHOL  No results found for: TRIG  No results found for: HDL  No components found for: LDLCALC  No results found for: LDL  No results found for: LDLDIRECT    Not on statin   Prefers not to take      Blood Pressure Management:    Vitals:    11/18/19 1403   BP: 128/74   Pulse: 97   SpO2: 98%      No results found for: GLUCOSE, CALCIUM, NA, K, CO2, CL, BUN, CREATININE, EGFRIFAFRI, EGFRIFNONA, BCR, ANIONGAP        Nl bp on hydralazine and losartan    On chlorthalidone, stopped before by me when I added jardiance     Microvascular Complication Monitoring:      Eye Exam Evaluation  Within 1 year   -----------    Last Microalbumin-Proteinuria Assessment    No results found for: MALBCRERATIO    No results found for: UTPCR    -----------      Neuropathy, none            Weight Related:   Wt Readings from Last 3 Encounters:   11/18/19 71.6 kg (157 lb 14.4 oz)   08/12/19 74.3 kg (163 lb 11.2 oz)   04/22/19 78.5 kg (173 lb)     Body mass index is 28.88 kg/m².        Diet interventions: moderate (500 kCal/d) deficit diet.      Bone Health    No results found for: PTH, CALCIUM, CAION, PHOS, [id number]    Thyroid Health    No results found for: TSH         Other Diabetes Related Aspects       No results found for: [id number]         Orders Placed This Encounter   Procedures   • Hemoglobin A1c     This order was created through External Result Entry         A copy of my note was sent to Antonietta Singh MD    Please see my above opinion and suggestions.

## 2020-06-08 ENCOUNTER — OFFICE VISIT (OUTPATIENT)
Dept: ENDOCRINOLOGY | Facility: CLINIC | Age: 75
End: 2020-06-08

## 2020-06-08 VITALS
HEIGHT: 62 IN | SYSTOLIC BLOOD PRESSURE: 125 MMHG | BODY MASS INDEX: 31.47 KG/M2 | OXYGEN SATURATION: 98 % | DIASTOLIC BLOOD PRESSURE: 80 MMHG | WEIGHT: 171 LBS | HEART RATE: 83 BPM

## 2020-06-08 DIAGNOSIS — E11.59 HYPERTENSION ASSOCIATED WITH DIABETES (HCC): ICD-10-CM

## 2020-06-08 DIAGNOSIS — E11.69 MIXED DIABETIC HYPERLIPIDEMIA ASSOCIATED WITH TYPE 2 DIABETES MELLITUS (HCC): ICD-10-CM

## 2020-06-08 DIAGNOSIS — E11.22 TYPE 2 DIABETES MELLITUS WITH STAGE 3 CHRONIC KIDNEY DISEASE, WITHOUT LONG-TERM CURRENT USE OF INSULIN (HCC): Primary | ICD-10-CM

## 2020-06-08 DIAGNOSIS — N18.2 CKD (CHRONIC KIDNEY DISEASE), STAGE II: ICD-10-CM

## 2020-06-08 DIAGNOSIS — E55.9 VITAMIN D DEFICIENCY: ICD-10-CM

## 2020-06-08 DIAGNOSIS — I15.2 HYPERTENSION ASSOCIATED WITH DIABETES (HCC): ICD-10-CM

## 2020-06-08 DIAGNOSIS — N18.30 TYPE 2 DIABETES MELLITUS WITH STAGE 3 CHRONIC KIDNEY DISEASE, WITHOUT LONG-TERM CURRENT USE OF INSULIN (HCC): Primary | ICD-10-CM

## 2020-06-08 DIAGNOSIS — E78.2 MIXED DIABETIC HYPERLIPIDEMIA ASSOCIATED WITH TYPE 2 DIABETES MELLITUS (HCC): ICD-10-CM

## 2020-06-08 PROCEDURE — 99214 OFFICE O/P EST MOD 30 MIN: CPT | Performed by: INTERNAL MEDICINE

## 2020-06-08 RX ORDER — PRAVASTATIN SODIUM 40 MG
TABLET ORAL
COMMUNITY

## 2020-06-08 RX ORDER — DULAGLUTIDE 1.5 MG/.5ML
1.5 INJECTION, SOLUTION SUBCUTANEOUS WEEKLY
Qty: 12 PEN | Refills: 3
Start: 2020-06-08 | End: 2020-06-10 | Stop reason: SDUPTHER

## 2020-06-08 NOTE — PROGRESS NOTES
" Sophia Neff is a 75 y.o. female who presents for  evaluation of   Chief Complaint   Patient presents with   • Follow-up                                       This was a Telehealth Encounter. Benefits and Disadvantages of a Telehealth Visit were discussed and accepted by patient. .  Patient agreed to receive service through Telehealth visit as patient is being compliant with social distancing recommendations imparted by CDC.     You have chosen to receive care through a telehealth visit.  Do you consent to use a video/audio connection for your medical care today? Yes        Referring provider    Primary Care Provider    Antonietta Singh MD    Duration 10 years    Timing - Diabetes is Constant    Quality -  Well controlled    Severity -  moderate    Complications - CKD stage III    Current symptoms/problems  none     Alleviating Factors: Compliance       Side Effects  Metformin     Januvia too weak     Current diet  in general, a \"healthy\" diet      Current exercise walking    Current monitoring regimen: home blood tests - checking 1 x daily   Home blood sugar records:     Hypoglycemia if skipped meals     Objective:   /80 (BP Location: Right arm, Patient Position: Sitting, Cuff Size: Large Adult)   Pulse 83   Ht 157.5 cm (62\")   Wt 77.6 kg (171 lb)   SpO2 98%   BMI 31.28 kg/m²     Physical Exam   HENT:   Head: Normocephalic.   Pulmonary/Chest: Effort normal.   Musculoskeletal:      Right lower leg: No edema.      Left lower leg: No edema.   Neurological: She is alert.       Lab Review          Assessment/Plan       ICD-10-CM ICD-9-CM   1. Type 2 diabetes mellitus with stage 3 chronic kidney disease, without long-term current use of insulin (CMS/[facility])  E11.22 250.40    N18.3 585.3   2. Hypertension associated with diabetes (CMS/[facility])  E11.59 250.80    I10 401.9   3. CKD (chronic kidney disease), stage II  N18.2 585.2   4. Vitamin D deficiency  E55.9 268.9   5. Mixed diabetic " hyperlipidemia associated with type 1 diabetes mellitus (CMS/[facility])  E10.69 250.81    E78.2 272.2         I reviewed and summarized records from Antonietta Singh MD from current year  and I reviewed / ordered labs.   From review of records :   Glycemic Management:   Lab Results   Component Value Date    HGBA1C 6.36 (H) 03/12/2021    HGBA1C 6.4 11/16/2020    HGBA1C 5.9 10/21/2019       trulicity 3 mg weekly  and jardiance 10 mg daily      Lipid Management  Lab Results   Component Value Date    CHOL 147 03/12/2021     Lab Results   Component Value Date    TRIG 131 03/12/2021     Lab Results   Component Value Date    HDL 47 03/12/2021     No components found for: LDLCALC  Lab Results   Component Value Date    LDL 77 03/12/2021     No results found for: LDLDIRECT    Pravastatin 40 mg qhs     Blood Pressure Management:    Vitals:    06/08/20 1535   BP: 125/80   Pulse: 83   SpO2: 98%         Nl bp on hydralazine and losartan    I stopped chlorthalidone before when I added jardiance     Microvascular Complication Monitoring:      Eye Exam Evaluation  Within 1 year   -----------    Last Microalbumin-Proteinuria Assessment    No results found for: MALBCRERATIO    No results found for: UTPCR    -----------      Neuropathy, none            Bone Health    Lab Results   Component Value Date    CALCIUM 9.3 03/12/2021    GGAG06EQ 51.8 03/12/2021       Thyroid Health    Lab Results   Component Value Date    TSH 1.490 03/12/2021            Other Diabetes Related Aspects       Lab Results   Component Value Date    [id number] 554 03/12/2021            Orders Placed This Encounter   Procedures   • CBC Auto Differential   • Comprehensive Metabolic Panel   • Hemoglobin A1c   • Lipid Panel   • Microalbumin / Creatinine Urine Ratio - Urine, Clean Catch   • TSH   • Vitamin B12   • Vitamin D 25 Hydroxy         A copy of my note was sent to Antonietta Singh MD    Please see my above opinion and suggestions.     I  spent 15 minutes reviewing patient electronic chart , reviewing medications , past history , active problems.   I provided advice regarding management of medical conditions, refilled prescriptions , ordered labs and arranged for future appointment.   Patient was advised to contact us if there were any unanswered questions or ongoing concerns.

## 2020-06-09 ENCOUNTER — TELEPHONE (OUTPATIENT)
Dept: ENDOCRINOLOGY | Facility: CLINIC | Age: 75
End: 2020-06-09

## 2020-06-09 NOTE — TELEPHONE ENCOUNTER
Pt called about the ozempic [facility] doesn't carry it so she needs the trulicity 90 days and with 3 [place]    [place] [facility] [place]   Thank You

## 2020-06-10 RX ORDER — DULAGLUTIDE 1.5 MG/.5ML
1.5 INJECTION, SOLUTION SUBCUTANEOUS WEEKLY
Qty: 12 PEN | Refills: 3
Start: 2020-06-10 | End: 2020-06-12 | Stop reason: SDUPTHER

## 2020-06-12 RX ORDER — DULAGLUTIDE 1.5 MG/.5ML
1.5 INJECTION, SOLUTION SUBCUTANEOUS WEEKLY
Qty: 12 PEN | Refills: 3
Start: 2020-06-12 | End: 2020-10-14 | Stop reason: SDUPTHER

## 2020-06-12 NOTE — TELEPHONE ENCOUNTER
Pt called requesting a Trulicity 90 day supply, [place]  [place] [facility] [place] She said they cannot do the ozempic.

## 2020-09-24 ENCOUNTER — TELEPHONE (OUTPATIENT)
Dept: ENDOCRINOLOGY | Facility: CLINIC | Age: 75
End: 2020-09-24

## 2020-10-05 ENCOUNTER — TELEPHONE (OUTPATIENT)
Dept: ENDOCRINOLOGY | Facility: CLINIC | Age: 75
End: 2020-10-05

## 2020-10-05 NOTE — TELEPHONE ENCOUNTER
Pt needs sent to [facility] in [place] 90 days with 3 refills     Trulicity  1.5 mg

## 2020-10-06 NOTE — TELEPHONE ENCOUNTER
I sent over a fax requesting a refill for this medication due to not being able to get anyone to answer the phone

## 2020-10-07 NOTE — TELEPHONE ENCOUNTER
[facility] - [place], [place] - [address] - [phone]  - [phone] FX    Left a vm stating that the faxed scripts are legally not acceptable, it was not signed, they do not accept faxed, ,it will need to be sent as an escript. They do not take phoned in scripts either, stated that we can send a paper copy with the patient if needed.

## 2020-10-14 RX ORDER — DULAGLUTIDE 1.5 MG/.5ML
1.5 INJECTION, SOLUTION SUBCUTANEOUS WEEKLY
Qty: 12 PEN | Refills: 3 | Status: SHIPPED | OUTPATIENT
Start: 2020-10-14 | End: 2020-11-16

## 2020-10-14 NOTE — TELEPHONE ENCOUNTER
Pt left a vm stating that she is needing the trulicity script to be sent to     [facility] - [place], [place] - [address] - [phone]  - [phone] (Pharmacy)    This is the pharmacy that was not accepting Rama's scripts last week

## 2020-10-28 ENCOUNTER — TREATMENT (OUTPATIENT)
Dept: PHYSICAL THERAPY | Facility: CLINIC | Age: 75
End: 2020-10-28

## 2020-10-28 ENCOUNTER — TRANSCRIBE ORDERS (OUTPATIENT)
Dept: PHYSICAL THERAPY | Facility: CLINIC | Age: 75
End: 2020-10-28

## 2020-10-28 DIAGNOSIS — S62.521D CLOSED DISPLACED FRACTURE OF DISTAL PHALANX OF RIGHT THUMB WITH ROUTINE HEALING, SUBSEQUENT ENCOUNTER: Primary | ICD-10-CM

## 2020-10-28 PROCEDURE — 97162 PT EVAL MOD COMPLEX 30 MIN: CPT | Performed by: PHYSICAL THERAPIST

## 2020-10-29 ENCOUNTER — TREATMENT (OUTPATIENT)
Dept: PHYSICAL THERAPY | Facility: CLINIC | Age: 75
End: 2020-10-29

## 2020-10-29 DIAGNOSIS — S62.521D CLOSED DISPLACED FRACTURE OF DISTAL PHALANX OF RIGHT THUMB WITH ROUTINE HEALING, SUBSEQUENT ENCOUNTER: Primary | ICD-10-CM

## 2020-10-29 PROCEDURE — 97530 THERAPEUTIC ACTIVITIES: CPT | Performed by: PHYSICAL THERAPIST

## 2020-10-29 PROCEDURE — 97110 THERAPEUTIC EXERCISES: CPT | Performed by: PHYSICAL THERAPIST

## 2020-10-29 NOTE — PROGRESS NOTES
Physical Therapy Daily Progress Note    Patient: Sophia Neff   : 1945  Diagnosis/ICD-10 Code:     Diagnosis Plan   1. Closed displaced fracture of distal phalanx of right thumb with routine healing, subsequent encounter       Referring practitioner: [facility]*  Date of Initial Visit: Type: THERAPY  Noted: 10/28/2020  Today's Date: 10/29/2020  Patient seen for 2 sessions      PT Recheck Due: 2020  PT MD Visit: 2020       Sophia Neff        Subjective Evaluation    History of Present Illness    Subjective comment: states that she doesn't have much pain when first presenting. states that she has been using heat on her hand next visit. with activitiy in treatment today states that its more of a soreness than pain/pain. Pain  Current pain ratin             Objective   See Exercise, Manual, and Modality Logs for complete treatment.       Assessment & Plan     Assessment  Assessment details: Patient instructed to only utilize the rigid wrist splint when she is completing heavy activities at home that require a lot of lifting. To be out of the brace and using her hand for simple activities of daily living, opening door knobs, etc.  Able to complete oppositon of thumb to digits 2,3 and 4 with no difficulty, but painful with opposition to 5th digit. By end of treatment patent is able to complete composit fist of 90% or greater and Patient is able to oppose thumb to medial aspect of 5th digit improving ability to complete functional tasks at home.     Goals  Plan Goals: Short Term Goals:  1) I with HEP and have additions/changes by next re-certification. (met)    2) AROM R wrist flexion>=75°.    3) AROM R wrist extension>=55°.    4) AROM R wrist UD >= 30°.    5) AROM R wrist RD >= 20°.    6) R wrist MMT >= 4/5.    7) R  at the #2 setting >= 10#.    8) Patient able to make a full composite fist with digits 2-5.    Long Term goals:  1) AROM for the R wrist all WNL, no  increase in pain.    2) B wrist >= 4+/5, no increase in pain.    3) R  at the #2 setting >= 20#.    4) R Thumb oppostion to 5th finger tip.    5) T Thumb abduction >= 75°.    6) Patient able to perform 5 minutes of fine motor activities with no increase in pain.    7) R pinch strengths within 1# of the L.    8) I with final HEP    Plan  Plan details: Putty for gripping and pinching next visit      Progress per Plan of Care and Progress strengthening /stabilization /functional activity            Timed:  Manual Therapy:         mins  96329;  Therapeutic Exercise:    35     mins  89981;   Aquatic Therex :        mins  14766    Neuromuscular Rohith:        mins  60326;    Therapeutic Activity:    28      mins  80194;     Gait Training:           mins  30249;     Ultrasound:          mins  74848;    Electrical Stimulation:         mins  16365 ( );    Untimed:  Electrical Stimulation:         mins  42800 ( );  Mechanical Traction:         mins  23845;   Orthotic fit/train:         mins  10013    Timed Treatment:   62   mins   Total Treatment:     62   mins  Paula Ahumada PTA  Physical Therapist Assistant

## 2020-11-02 ENCOUNTER — TREATMENT (OUTPATIENT)
Dept: PHYSICAL THERAPY | Facility: CLINIC | Age: 75
End: 2020-11-02

## 2020-11-02 DIAGNOSIS — S62.521D CLOSED DISPLACED FRACTURE OF DISTAL PHALANX OF RIGHT THUMB WITH ROUTINE HEALING, SUBSEQUENT ENCOUNTER: Primary | ICD-10-CM

## 2020-11-02 PROCEDURE — 97530 THERAPEUTIC ACTIVITIES: CPT | Performed by: PHYSICAL THERAPIST

## 2020-11-02 PROCEDURE — 97110 THERAPEUTIC EXERCISES: CPT | Performed by: PHYSICAL THERAPIST

## 2020-11-02 NOTE — PROGRESS NOTES
Physical Therapy Daily Progress Note    Patient: Sophia Neff   : 1945  Diagnosis/ICD-10 Code:     Diagnosis Plan   1. Closed displaced fracture of distal phalanx of right thumb with routine healing, subsequent encounter       Referring practitioner: [facility]*  Date of Initial Visit: Type: THERAPY  Noted: 10/28/2020  Today's Date: 2020  Patient seen for 3 sessions      PT Recheck Due: 2020  PT MD Visit: 2020       Sophia Neff        Subjective Evaluation    History of Present Illness    Subjective comment: states that she is sore. have been using the keyboard, using the computer mouse on the right sidePain  Current pain rating: 3             Objective   See Exercise, Manual, and Modality Logs for complete treatment.       Assessment & Plan     Assessment  Assessment details: Patient has complaints that she hasn't been able to start functional tasks at home because of the hand such as driving, or opening doors, and signing paperwork. Utilized treatment to work on strength and flexibility and then worked on functional activities to facilitate increased independence with functional tasks at home. Patient is able to complete handwriting for 4 minutes demonstrating the ability to sign paperwork related to her accident that she did not think she was able to do previously. Patient is able to wring a towel demonstrating the ability to wring a wash rag and such out at home for household chores.     Goals  Plan Goals: Short Term Goals:  1) I with HEP and have additions/changes by next re-certification. (met)    2) AROM R wrist flexion>=75°.    3) AROM R wrist extension>=55°.    4) AROM R wrist UD >= 30°.    5) AROM R wrist RD >= 20°.    6) R wrist MMT >= 4/5.    7) R  at the #2 setting >= 10#.    8) Patient able to make a full composite fist with digits 2-5. (met)    Long Term goals:  1) AROM for the R wrist all WNL, no increase in pain.    2) B wrist >= 4+/5, no  increase in pain.    3) R  at the #2 setting >= 20#.    4) R Thumb oppostion to 5th finger tip. (met)    5) T Thumb abduction >= 75°.    6) Patient able to perform 5 minutes of fine motor activities with no increase in pain.    7) R pinch strengths within 1# of the L.    8) I with final HEP    Plan  Plan details: Putty pinching, gripping next visit      Progress per Plan of Care and Progress strengthening /stabilization /functional activity            Timed:  Manual Therapy:         mins  27672;  Therapeutic Exercise:    30     mins  88943;   Aquatic Therex :        mins  02625    Neuromuscular Rohith:        mins  70799;    Therapeutic Activity:     35     mins  12740;     Gait Training:           mins  75382;     Ultrasound:          mins  17016;    Electrical Stimulation:         mins  29497 ( );    Untimed:  Electrical Stimulation:         mins  98339 ( );  Mechanical Traction:         mins  71872;   Orthotic fit/train:         mins  75095    Timed Treatment:   65   mins   Total Treatment:     65   mins  Paula Ahumada PTA  Physical Therapist Assistant

## 2020-11-06 ENCOUNTER — TREATMENT (OUTPATIENT)
Dept: PHYSICAL THERAPY | Facility: CLINIC | Age: 75
End: 2020-11-06

## 2020-11-06 DIAGNOSIS — S62.521D CLOSED DISPLACED FRACTURE OF DISTAL PHALANX OF RIGHT THUMB WITH ROUTINE HEALING, SUBSEQUENT ENCOUNTER: Primary | ICD-10-CM

## 2020-11-06 PROCEDURE — 97110 THERAPEUTIC EXERCISES: CPT | Performed by: PHYSICAL THERAPIST

## 2020-11-06 NOTE — PROGRESS NOTES
"   Physical Therapy Daily Progress Note      Patient: Sophia Neff   : 1945  Referring practitioner: LILIBETH Chavez  Date of Initial Visit: Type: THERAPY  Noted: 10/28/2020  Today's Date: 2020  Patient seen for 4 sessions    Recert due:   20  MD followup:  20     Sophia Neff reports:   Subjective Questionnaire:       Subjective  Reports wrist and hand is \"sore\".  Pre RX pain 0/10.     Objective   TTP R distal thumb and radial wrist.    See Exercise, Manual, and Modality Logs for complete treatment.       Assessment & Plan     Assessment  Assessment details: Pt performed all therex with no c/o pain.  Is still painful with manual stretching of thumb. Added thera putty to HEP for  and pinch exercises.     Goals  Plan Goals: Plan Goals: Short Term Goals:  1) I with HEP and have additions/changes by next re-certification. (met)    2) AROM R wrist flexion>=75°.    3) AROM R wrist extension>=55°.    4) AROM R wrist UD >= 30°.    5) AROM R wrist RD >= 20°.    6) R wrist MMT >= 4/5.    7) R  at the #2 setting >= 10#.    8) Patient able to make a full composite fist with digits 2-5. (met)    Long Term goals:  1) AROM for the R wrist all WNL, no increase in pain.    2) B wrist >= 4+/5, no increase in pain.    3) R  at the #2 setting >= 20#.    4) R Thumb oppostion to 5th finger tip. (met)    5) T Thumb abduction >= 75°.    6) Patient able to perform 5 minutes of fine motor activities with no increase in pain.    7) R pinch strengths within 1# of the L.    8) I with final HEP    Plan  Plan details: Measure ROM for goal assessment        Visit Diagnoses:    ICD-10-CM ICD-9-CM   1. Closed displaced fracture of distal phalanx of right thumb with routine healing, subsequent encounter  S62.521D V54.19                  Timed:  Manual Therapy:         mins  52030;  Therapeutic Exercise:   43      mins  72895;     Neuromuscular Rohith:        mins  02775;    Therapeutic " Activity:          mins  82332;     Gait Training:           mins  69714;     Ultrasound:          mins  33620;    Electrical Stimulation:         mins  70636 ( );    Untimed:  Electrical Stimulation:         mins  91192 ( );  Mechanical Traction:         mins  13525;     Timed Treatment:  43    mins   Total Treatment:    43    mins  Court Bose PTA  Physical Therapist Assistant

## 2020-11-09 ENCOUNTER — TREATMENT (OUTPATIENT)
Dept: PHYSICAL THERAPY | Facility: CLINIC | Age: 75
End: 2020-11-09

## 2020-11-09 DIAGNOSIS — S62.521D CLOSED DISPLACED FRACTURE OF DISTAL PHALANX OF RIGHT THUMB WITH ROUTINE HEALING, SUBSEQUENT ENCOUNTER: Primary | ICD-10-CM

## 2020-11-09 PROCEDURE — 97110 THERAPEUTIC EXERCISES: CPT | Performed by: PHYSICAL THERAPIST

## 2020-11-09 NOTE — PROGRESS NOTES
Physical Therapy Daily Progress Note    Patient: Sophia Neff   : 1945  Diagnosis/ICD-10 Code:     Diagnosis Plan   1. Closed displaced fracture of distal phalanx of right thumb with routine healing, subsequent encounter       Referring practitioner: [facility]*  Date of Initial Visit: Type: THERAPY  Noted: 10/28/2020  Today's Date: 2020  Patient seen for 5 sessions      PT Recheck Due: 2020  PT MD Visit: 2020       Sophia Neff reports: around 50% improvement       Subjective Questionnaire:       Subjective Evaluation    History of Present Illness    Subjective comment: Patient states her hand wakes up hurting.  Some nights sleeping with brace on some off.  No change in pain either way.  Pain  Current pain rating: 3             Objective   See Exercise, Manual, and Modality Logs for complete treatment.       Assessment & Plan     Assessment  Assessment details: Pt forgot to bring putty from home for today's session.  Putty exercises held this date but reviewed HEP for home use with putty.  Patient is painful with all ROM stretches and exercises.  Struggle with opposition especially to 5th digit.  Pt does report she is trying to use the hand more at home.  Complains of arm, elbow and wrist pain along with thumb during stretches and strengthening.      Goals  Plan Goals: Short Term Goals:  1) I with HEP and have additions/changes by next re-certification. (met)    2) AROM R wrist flexion>=75°.    3) AROM R wrist extension>=55°.    4) AROM R wrist UD >= 30°.    5) AROM R wrist RD >= 20°.    6) R wrist MMT >= 4/5.    7) R  at the #2 setting >= 10#.    8) Patient able to make a full composite fist with digits 2-5. (met)    Long Term goals:  1) AROM for the R wrist all WNL, no increase in pain.    2) B wrist >= 4+/5, no increase in pain.    3) R  at the #2 setting >= 20#.    4) R Thumb oppostion to 5th finger tip. (met)    5) T Thumb abduction >= 75°.    6)  Patient able to perform 5 minutes of fine motor activities with no increase in pain.    7) R pinch strengths within 1# of the L.    8) I with final HEP    Plan  Plan details: Next visit add back putty exercises if available.  Continue with ROM and strengthening.       Progress per Plan of Care and Progress strengthening /stabilization /functional activity            Timed:  Manual Therapy:         mins  82109;  Therapeutic Exercise:    44     mins  78770;   Aquatic Therex :        mins  48738    Neuromuscular Rohith:        mins  88388;    Therapeutic Activity:          mins  69243;     Gait Training:           mins  29213;     Ultrasound:          mins  83051;    Electrical Stimulation:         mins  68337 ( );    Untimed:  Electrical Stimulation:         mins  75048 ( );  Mechanical Traction:         mins  34683;   Orthotic fit/train:         mins  68578    Timed Treatment:    44  mins   Total Treatment:     44   mins  Shital Booker PTA  Physical Therapist Assistant

## 2020-11-11 ENCOUNTER — TREATMENT (OUTPATIENT)
Dept: PHYSICAL THERAPY | Facility: CLINIC | Age: 75
End: 2020-11-11

## 2020-11-11 DIAGNOSIS — S62.521D CLOSED DISPLACED FRACTURE OF DISTAL PHALANX OF RIGHT THUMB WITH ROUTINE HEALING, SUBSEQUENT ENCOUNTER: Primary | ICD-10-CM

## 2020-11-11 PROCEDURE — 97110 THERAPEUTIC EXERCISES: CPT | Performed by: PHYSICAL THERAPIST

## 2020-11-11 PROCEDURE — 97140 MANUAL THERAPY 1/> REGIONS: CPT | Performed by: PHYSICAL THERAPIST

## 2020-11-11 NOTE — PROGRESS NOTES
Physical Therapy Daily Progress Note      Patient: Sophia Neff   : 1945  Referring practitioner: LILIBETH Chavez  Date of Initial Visit: Type: THERAPY  Noted: 10/28/2020  Today's Date: 2020  Patient seen for 6 sessions    Recert due:  20  MD followup:  20     Sophia Neff reports:   Subjective Questionnaire:       Subjective  Reports that her hand is stiff today.      Objective          Active Range of Motion     Right Wrist   Wrist flexion: 40 degrees   Wrist extension: 68 degrees       See Exercise, Manual, and Modality Logs for complete treatment.       Assessment & Plan     Assessment  Assessment details: Pt with improved wrist extension, but decreased flexion today.  Getting more functional with thumb mobility.     Goals  Plan Goals: Plan Goals: Short Term Goals:  1) I with HEP and have additions/changes by next re-certification. (met)    2) AROM R wrist flexion>=75°.    3) AROM R wrist extension>=55°.    4) AROM R wrist UD >= 30°.    5) AROM R wrist RD >= 20°.    6) R wrist MMT >= 4/5.    7) R  at the #2 setting >= 10#.    8) Patient able to make a full composite fist with digits 2-5. (met)    Long Term goals:  1) AROM for the R wrist all WNL, no increase in pain.    2) B wrist >= 4+/5, no increase in pain.    3) R  at the #2 setting >= 20#.    4) R Thumb oppostion to 5th finger tip. (met)    5) T Thumb abduction >= 75°.    6) Patient able to perform 5 minutes of fine motor activities with no increase in pain.    7) R pinch strengths within 1# of the L.    8) I with final HEP    Plan  Plan details: Clothespin pinch        Visit Diagnoses:    ICD-10-CM ICD-9-CM   1. Closed displaced fracture of distal phalanx of right thumb with routine healing, subsequent encounter  S62.521D V54.19                  Timed:  Manual Therapy:  8       mins  87450;  Therapeutic Exercise:   34      mins  85897;     Neuromuscular Rohith:        mins  49749;    Therapeutic  Activity:          mins  26160;     Gait Training:           mins  67604;     Ultrasound:          mins  99115;    Electrical Stimulation:         mins  99990 ( );    Untimed:  Electrical Stimulation:         mins  38174 ( );  Mechanical Traction:         mins  48959;     Timed Treatment: 42     mins   Total Treatment:    42    mins  Court Bose PTA  Physical Therapist Assistant

## 2020-11-16 ENCOUNTER — OFFICE VISIT (OUTPATIENT)
Dept: ENDOCRINOLOGY | Facility: CLINIC | Age: 75
End: 2020-11-16

## 2020-11-16 VITALS
BODY MASS INDEX: 31.31 KG/M2 | SYSTOLIC BLOOD PRESSURE: 170 MMHG | OXYGEN SATURATION: 98 % | DIASTOLIC BLOOD PRESSURE: 90 MMHG | HEART RATE: 82 BPM | WEIGHT: 171.2 LBS

## 2020-11-16 DIAGNOSIS — N18.2 CKD (CHRONIC KIDNEY DISEASE), STAGE II: ICD-10-CM

## 2020-11-16 DIAGNOSIS — E11.22 TYPE 2 DIABETES MELLITUS WITH STAGE 3A CHRONIC KIDNEY DISEASE, WITHOUT LONG-TERM CURRENT USE OF INSULIN (HCC): Primary | ICD-10-CM

## 2020-11-16 DIAGNOSIS — E55.9 VITAMIN D DEFICIENCY: ICD-10-CM

## 2020-11-16 DIAGNOSIS — E11.59 HYPERTENSION ASSOCIATED WITH DIABETES (HCC): ICD-10-CM

## 2020-11-16 DIAGNOSIS — E78.2 MIXED DIABETIC HYPERLIPIDEMIA ASSOCIATED WITH TYPE 1 DIABETES MELLITUS (HCC): ICD-10-CM

## 2020-11-16 DIAGNOSIS — N18.31 TYPE 2 DIABETES MELLITUS WITH STAGE 3A CHRONIC KIDNEY DISEASE, WITHOUT LONG-TERM CURRENT USE OF INSULIN (HCC): Primary | ICD-10-CM

## 2020-11-16 DIAGNOSIS — I15.2 HYPERTENSION ASSOCIATED WITH DIABETES (HCC): ICD-10-CM

## 2020-11-16 DIAGNOSIS — E10.69 MIXED DIABETIC HYPERLIPIDEMIA ASSOCIATED WITH TYPE 1 DIABETES MELLITUS (HCC): ICD-10-CM

## 2020-11-16 LAB — HBA1C MFR BLD: 6.4 %

## 2020-11-16 PROCEDURE — 99214 OFFICE O/P EST MOD 30 MIN: CPT | Performed by: INTERNAL MEDICINE

## 2020-11-16 PROCEDURE — 83036 HEMOGLOBIN GLYCOSYLATED A1C: CPT | Performed by: INTERNAL MEDICINE

## 2020-11-16 RX ORDER — DULAGLUTIDE 3 MG/.5ML
3 INJECTION, SOLUTION SUBCUTANEOUS WEEKLY
Qty: 12 PEN | Refills: 3 | Status: SHIPPED | OUTPATIENT
Start: 2020-11-16 | End: 2021-07-15

## 2020-11-16 NOTE — PROGRESS NOTES
"Sophia Neff is a 75 y.o. female who presents for  evaluation of   Chief Complaint   Patient presents with   • Follow-up     4 mo recheck       Referring provider    Primary Care Provider    Antonietta Singh MD    Duration 10 years    Timing - Diabetes is Constant    Quality -  Well controlled    Severity -  moderate    Complications - CKD stage III    Current symptoms/problems  none     Alleviating Factors: Compliance       Side Effects  Metformin     Januvia too weak     Current diet  in general, a \"healthy\" diet      Current exercise walking    Current monitoring regimen: home blood tests - checking 1 x daily   Home blood sugar records:     Hypoglycemia if skipped meals     Past Medical History:   Diagnosis Date   • Allergic    • Anemia    • Arthritis    • Bell's palsy    • Chronic kidney disease    • Controlled type 2 diabetes mellitus without complication, without long-term current use of insulin (CMS/[facility]) 2019   • Diabetes mellitus (CMS/[facility])    • Fibromyalgia    • High cholesterol    • Hypertension    • 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    • Plantar fasciitis     Bilateral     Family History   Problem Relation Age of Onset   • Diabetes Sister    • Hyperlipidemia Sister    • Arthritis Sister    • Diabetes Brother    • Cancer Brother    • Cancer Paternal Aunt    • Depression Paternal Grandfather      Social History     Tobacco Use   • Smoking status: Former Smoker     Quit date:      Years since quittin.9   • Smokeless tobacco: Never Used   Substance Use Topics   • Alcohol use: Yes     Comment: 6x per year   • Drug use: Not Currently         Current Outpatient Medications:   •  acetaminophen (TYLENOL) 325 MG tablet, Tylenol 325 mg tablet  Take 2 by mouth, Disp: , Rfl:   •  calcium carbonate (OS-JESICA) 600 MG tablet, Take 600 mg by mouth 2 (Two) Times a Day With Meals., Disp: , Rfl:   •  diclofenac (VOLTAREN) 1 % gel gel, Apply 4 g topically to the " appropriate area as directed 2 (Two) Times a Day., Disp: , Rfl:   •  Empagliflozin (Jardiance) 10 MG tablet, Take 10 mg by mouth Daily., Disp: 90 tablet, Rfl: 3  •  FREESTYLE LITE test strip, Use to test sugar 3 times daily, Disp: 300 each, Rfl: 3  •  hydrALAZINE (APRESOLINE) 10 MG tablet, 10 mg 3 (Three) Times a Day., Disp: , Rfl:   •  Lancets (FREESTYLE) lancets, Use to test sugar 3 times daily, Disp: 300 each, Rfl: 3  •  loratadine (CLARITIN) 10 MG tablet, loratadine 10 mg tablet  Take 1 by mouth, Disp: , Rfl:   •  losartan (COZAAR) 100 MG tablet, Take 100 mg by mouth Daily., Disp: , Rfl:   •  Multiple Vitamins-Minerals (ONE-A-DAY WOMENS 50+ ADVANTAGE PO), One-A-Day Womens Formula 18 mg iron-400 mcg-500 mg tablet  Take 1 by mouth once a day, Disp: , Rfl:   •  pravastatin (PRAVACHOL) 40 MG tablet, pravastatin 40 mg tablet, Disp: , Rfl:   •  RESTASIS 0.05 % ophthalmic emulsion, , Disp: , Rfl:   •  triamcinolone (KENALOG) 0.1 % ointment, triamcinolone acetonide 0.1 % topical ointment, Disp: , Rfl:   •  Trulicity 1.5 MG/0.5ML solution pen-injector, Inject 1.5 mg under the skin into the appropriate area as directed 1 (One) Time Per Week., Disp: 12 pen, Rfl: 3    Review of Systems    Review of Systems   Constitutional: Negative for activity change, appetite change, chills, diaphoresis, fatigue, fever and unexpected weight change.   HENT: Negative for congestion, dental problem, drooling, ear discharge, ear pain, facial swelling, mouth sores, postnasal drip, rhinorrhea, sinus pressure, sore throat, tinnitus, trouble swallowing and voice change.    Eyes: Negative for photophobia, pain, discharge, redness, itching and visual disturbance.   Respiratory: Negative for apnea, cough, choking, chest tightness, shortness of breath, wheezing and stridor.    Cardiovascular: Negative for chest pain, palpitations and leg swelling.   Gastrointestinal: Negative for abdominal distention, abdominal pain, constipation, diarrhea, nausea  and vomiting.   Endocrine: Negative for cold intolerance, heat intolerance, polydipsia, polyphagia and polyuria.   Genitourinary: Negative for decreased urine volume, difficulty urinating, dysuria, flank pain, frequency, hematuria and urgency.   Musculoskeletal: Negative for arthralgias, back pain, gait problem, joint swelling, myalgias, neck pain and neck stiffness.        Right Thumb Pain    Skin: Negative for color change, pallor, rash and wound.   Allergic/Immunologic: Negative for immunocompromised state.   Neurological: Negative for dizziness, tremors, seizures, syncope, facial asymmetry, speech difficulty, weakness, light-headedness, numbness and headaches.   Hematological: Negative for adenopathy.   Psychiatric/Behavioral: Negative for agitation, behavioral problems, confusion, decreased concentration, dysphoric mood, hallucinations, self-injury, sleep disturbance and suicidal ideas. The patient is not nervous/anxious and is not hyperactive.         Objective:   /90   Pulse 82   Wt 77.7 kg (171 lb 3.2 oz)   SpO2 98%   BMI 31.31 kg/m²     Physical Exam   Constitutional: She is oriented to person, place, and time. She appears well-developed.   HENT:   Head: Normocephalic.   Right Ear: External ear normal.   Left Ear: External ear normal.   Nose: Nose normal.   Eyes: Conjunctivae are normal. No scleral icterus.   Neck: Normal range of motion. Neck supple. No tracheal deviation present. No thyromegaly present.   Cardiovascular: Normal rate, regular rhythm and normal heart sounds. Exam reveals no gallop and no friction rub.   No murmur heard.  Pulmonary/Chest: Effort normal and breath sounds normal. No stridor. No respiratory distress. She has no wheezes. She has no rales. She exhibits no tenderness.   Abdominal: Soft. Bowel sounds are normal. She exhibits no distension and no mass. There is no abdominal tenderness. There is no rebound and no guarding.   Musculoskeletal: Normal range of motion. No  tenderness or deformity.      Comments: Right wrist brace w thumb support    Lymphadenopathy:     She has no cervical adenopathy.   Neurological: She is alert and oriented to person, place, and time. She displays normal reflexes. She exhibits normal muscle tone. Coordination normal.   Skin: No rash noted. No erythema. No pallor.   Psychiatric: Her behavior is normal. Judgment and thought content normal.       Lab Review    Results for orders placed or performed in visit on 11/18/19   Hemoglobin A1c    Specimen: Blood   Result Value Ref Range    Hemoglobin A1C 5.9          Assessment/Plan       ICD-10-CM ICD-9-CM   1. Type 2 diabetes mellitus with stage 3a chronic kidney disease, without long-term current use of insulin (CMS/HCC)  E11.21 250.40    N18.31 585.3   2. Hypertension associated with diabetes (CMS/HCC)  E11.59 250.80    I10 401.9   3. CKD (chronic kidney disease), stage II  N18.2 585.2   4. Vitamin D deficiency  E55.9 268.9   5. Mixed diabetic hyperlipidemia associated with type 1 diabetes mellitus (CMS/HCC)  E10.69 250.81    E78.2 272.2         I reviewed and summarized records from Antonietta Singh MD from current year  and I reviewed / ordered labs.   From review of records :    Pt has type 2 diabetes and was having hypoglycemia   Glycemic Management:   Lab Results   Component Value Date    HGBA1C 5.9 10/21/2019    HGBA1C 6.7 08/12/2019     No results found for: GLUCOSE, BUN, CREATININE, EGFRIFNONA, EGFRIFAFRI, BCR, K, CO2, CALCIUM, PROTENTOTREF, ALBUMIN, LABIL2, BILIRUBIN, AST, ALT, ANIONGAP  No results found for: WBC, HGB, HCT, MCV, PLT    trulicity and jardiance ( I wanted invokana due to [facility] but insurance paid for jardiance )  Restart ozempic, gained more weight on trulicity   Maritza turcios wouldn't approve   Increase trulicity to 3 mg     Lipid Management  No results found for: CHOL  No results found for: TRIG  No results found for: HDL  No components found for: LDLCALC  No results  found for: LDL  No results found for: LDLDIRECT    Pravastatin 40 mg qhs     Blood Pressure Management:    Vitals:    11/16/20 1602   BP: 170/90   Pulse: 82   SpO2: 98%     No results found for: GLUCOSE, CALCIUM, NA, K, CO2, CL, BUN, CREATININE, EGFRIFAFRI, EGFRIFNONA, BCR, ANIONGAP        Nl bp on hydralazine and losartan    On chlorthalidone, stopped before by me when I added jardiance   Elevated today but recent thumb fracture   Checks bp at home and running     Microvascular Complication Monitoring:      Eye Exam Evaluation  Within 1 year   -----------    Last Microalbumin-Proteinuria Assessment    No results found for: MALBCRERATIO    No results found for: UTPCR    -----------      Neuropathy, none            Weight Related:   Wt Readings from Last 3 Encounters:   11/16/20 77.7 kg (171 lb 3.2 oz)   06/08/20 77.6 kg (171 lb)   11/18/19 71.6 kg (157 lb 14.4 oz)     Body mass index is 31.31 kg/m².        Diet interventions: moderate (500 kCal/d) deficit diet.      Bone Health    No results found for: PTH, CALCIUM, CAION, PHOS, [id number]    Thyroid Health    No results found for: TSH         Other Diabetes Related Aspects       No results found for: [id number]         No orders of the defined types were placed in this encounter.        A copy of my note was sent to Antonietta Singh MD    Please see my above opinion and suggestions.

## 2020-11-18 ENCOUNTER — TREATMENT (OUTPATIENT)
Dept: PHYSICAL THERAPY | Facility: CLINIC | Age: 75
End: 2020-11-18

## 2020-11-18 DIAGNOSIS — S62.521D CLOSED DISPLACED FRACTURE OF DISTAL PHALANX OF RIGHT THUMB WITH ROUTINE HEALING, SUBSEQUENT ENCOUNTER: Primary | ICD-10-CM

## 2020-11-18 PROCEDURE — 97110 THERAPEUTIC EXERCISES: CPT | Performed by: PHYSICAL THERAPIST

## 2020-11-18 NOTE — PROGRESS NOTES
Physical Therapy Daily Progress Note    Patient: Sophia Neff   : 1945  Diagnosis/ICD-10 Code:     Diagnosis Plan   1. Closed displaced fracture of distal phalanx of right thumb with routine healing, subsequent encounter       Referring practitioner: SHAYY Chavez*  Date of Initial Visit: Type: THERAPY  Noted: 10/28/2020  Today's Date: 2020  Patient seen for 7 sessions      PT Recheck Due: 2020  PT MD Visit: 2020       Sophia Neff reports: some days 60% improved         Subjective Evaluation    History of Present Illness    Subjective comment: Pt states she has had a bad day so far due to scheduling issues and car rides.  No pain reported in hand at this time.  Pain  Current pain ratin             Objective   See Exercise, Manual, and Modality Logs for complete treatment.       Assessment & Plan     Assessment  Assessment details: Pt does not have putty this date for treatment.  Reminded to bring every visit for progressing strength.  Pt states she is using the putty at home most days.  Pt states her hand was really painful yesterday without cause.  Tylenol did not help the pain.  No pain this date to report.  Occasional pain during session that is fleeting.  Pt tolerated new additions to treatment well.  Good challenge with tbar wringing.  Pt states she is only wear splint when out of the house.      Goals  Plan Goals: Short Term Goals:  1) I with HEP and have additions/changes by next re-certification. (met)    2) AROM R wrist flexion>=75°.    3) AROM R wrist extension>=55°.    4) AROM R wrist UD >= 30°.    5) AROM R wrist RD >= 20°.    6) R wrist MMT >= 4/5.    7) R  at the #2 setting >= 10#.    8) Patient able to make a full composite fist with digits 2-5. (met)    Long Term goals:  1) AROM for the R wrist all WNL, no increase in pain.    2) B wrist >= 4+/5, no increase in pain.    3) R  at the #2 setting >= 20#.    4) R Thumb oppostion to 5th  finger tip. (met)    5) T Thumb abduction >= 75°.    6) Patient able to perform 5 minutes of fine motor activities with no increase in pain.    7) R pinch strengths within 1# of the L.    8) I with final HEP    Plan  Plan details: Next visit add theraputty exercises to HEP if pt has putty available.  Consider flat hand weight shifts for joint mobilization through wrist.        Progress per Plan of Care and Progress strengthening /stabilization /functional activity            Timed:  Manual Therapy:         mins  05537;  Therapeutic Exercise:    43     mins  90098;   Aquatic Therex :        mins  04316    Neuromuscular Rohith:        mins  66310;    Therapeutic Activity:          mins  73968;     Gait Training:           mins  12173;     Ultrasound:          mins  78592;    Electrical Stimulation:         mins  13948 ( );    Untimed:  Electrical Stimulation:         mins  87018 ( );  Mechanical Traction:         mins  22368;   Orthotic fit/train:         mins  17935    Timed Treatment:   43   mins   Total Treatment:     43   mins  Shital Booker PTA  Physical Therapist Assistant

## 2020-11-23 ENCOUNTER — TREATMENT (OUTPATIENT)
Dept: PHYSICAL THERAPY | Facility: CLINIC | Age: 75
End: 2020-11-23

## 2020-11-23 DIAGNOSIS — S62.521D CLOSED DISPLACED FRACTURE OF DISTAL PHALANX OF RIGHT THUMB WITH ROUTINE HEALING, SUBSEQUENT ENCOUNTER: Primary | ICD-10-CM

## 2020-11-23 PROCEDURE — 97140 MANUAL THERAPY 1/> REGIONS: CPT | Performed by: PHYSICAL THERAPIST

## 2020-11-23 PROCEDURE — 97110 THERAPEUTIC EXERCISES: CPT | Performed by: PHYSICAL THERAPIST

## 2020-11-23 NOTE — PROGRESS NOTES
Physical Therapy Daily Progress Note      Patient: Sophia Neff   : 1945  Referring practitioner: SHAYY Chavez*  Date of Initial Visit: Type: THERAPY  Noted: 10/28/2020  Today's Date: 2020  Patient seen for 8 sessions    PT Recheck Due: 2020  PT MD Visit: 2020       Sophia Neff reports: 60% improvement        Subjective Evaluation    History of Present Illness    Subjective comment: pt states that she has good days and bad days. Right now her thumb/hand isnt bothering her Pain  Current pain ratin           Objective           General Comments     Wrist/Hand Comments  90% composite fist     See Exercise, Manual, and Modality Logs for complete treatment.       Assessment & Plan     Assessment  Assessment details: Pt required cues for form with stretching. Did fatigue with tputty pinching. No pain reported throughout. Still has difficulty driving but is practicing at home.     Goals  Plan Goals: Short Term Goals:  1) I with HEP and have additions/changes by next re-certification. (met)    2) AROM R wrist flexion>=75°.    3) AROM R wrist extension>=55°.    4) AROM R wrist UD >= 30°.    5) AROM R wrist RD >= 20°.    6) R wrist MMT >= 4/5.    7) R  at the #2 setting >= 10#.    8) Patient able to make a full composite fist with digits 2-5. (met)    Long Term goals:  1) AROM for the R wrist all WNL, no increase in pain.    2) B wrist >= 4+/5, no increase in pain.    3) R  at the #2 setting >= 20#.    4) R Thumb oppostion to 5th finger tip. (met)    5) T Thumb abduction >= 75°.    6) Patient able to perform 5 minutes of fine motor activities with no increase in pain.    7) R pinch strengths within 1# of the L.    8) I with final HEP      Plan  Plan details: Take AROM measurements next        Visit Diagnoses:    ICD-10-CM ICD-9-CM   1. Closed displaced fracture of distal phalanx of right thumb with routine healing, subsequent encounter  S62.521D V54.19        Progress per Plan of Care and Progress strengthening /stabilization /functional activity           Timed:  Manual Therapy:    8     mins  09402;  Therapeutic Exercise:    37     mins  78714;     Neuromuscular Rohith:        mins  46003;    Therapeutic Activity:          mins  25515;     Gait Training:           mins  33174;     Ultrasound:          mins  90430;    Electrical Stimulation:         mins  05479 ( );    Untimed:  Electrical Stimulation:         mins  42111 ( );  Mechanical Traction:         mins  17311;     Timed Treatment:   45   mins   Total Treatment:     45   mins  Adalgisa Landin [facility]  Physical Therapist

## 2020-12-01 ENCOUNTER — TREATMENT (OUTPATIENT)
Dept: PHYSICAL THERAPY | Facility: CLINIC | Age: 75
End: 2020-12-01

## 2020-12-01 DIAGNOSIS — S62.521D CLOSED DISPLACED FRACTURE OF DISTAL PHALANX OF RIGHT THUMB WITH ROUTINE HEALING, SUBSEQUENT ENCOUNTER: Primary | ICD-10-CM

## 2020-12-01 PROCEDURE — 97530 THERAPEUTIC ACTIVITIES: CPT | Performed by: PHYSICAL THERAPIST

## 2020-12-01 PROCEDURE — 97110 THERAPEUTIC EXERCISES: CPT | Performed by: PHYSICAL THERAPIST

## 2020-12-01 NOTE — PROGRESS NOTES
Re-Assessment / Re-Certification      Patient: Sophia Neff   : 1945  Diagnosis/ICD-10 Code:  Closed displaced fracture of distal phalanx of right thumb with routine healing, subsequent encounter [S62.521D]  Referring practitioner: SHAYY Chavez*  Date of Initial Visit: Type: THERAPY  Noted: 10/28/2020  Today's Date: 2020  Patient seen for 9 sessions    Next MD appt: 2020.  Recertification: 2020     Subjective:   Sophia Neff reports: 80% improvement  Subjective Questionnaire: QuickDASH: 27.27%  Clinical Progress: improved  Home Program Compliance: Yes  Treatment has included: therapeutic exercise, neuromuscular re-education, therapeutic activity and cryotherapy    Subjective Evaluation    History of Present Illness    Subjective comment: patient rpeorts it bothers her more on the palmar side of the wrist then in the tumb when it does bother her. Patient reports she drove again for the first time last week and it was hard but went okay.Pain  Current pain ratin         Objective          Observations     Left Wrist/Hand   Positive for atrophy and trophic changes.     Right Wrist/Hand   Positive for atrophy and trophic changes.       Tenderness     Right Wrist/Hand   Tenderness in the carpometacarpal joint.     Neurological Testing     Sensation     Wrist/Hand   Left   Intact: light touch    Right   Intact: light touch    Active Range of Motion     Left Wrist   Normal active range of motion    Right Wrist   Wrist flexion: 80 degrees   Wrist extension: 68 degrees   Radial deviation: 14 degrees   Ulnar deviation: 32 degrees     Left Thumb   Opposition: Opposition to the 5th fat pad    Right Thumb   Flexion     CMC: 75 degrees  Palmar Abduction    CMC: 88 degrees  Opposition: Opposition to the 5th fat pad    Additional Active Range of Motion Details  ~75 composite fist with digits 2-5.    Strength/Myotome Testing     Left Wrist/Hand   Normal wrist strength      (2nd hand position)     Trial 1: 32 lbs    Trial 2: 30 lbs    Trial 3: 28 lbs    Average: 30 lbs    Thumb Strength  Key/Lateral Pinch     Trial 1: 10 lbs  Tip/Two-Point Pinch     Trial 1: 7 lbs  Palmar/Three-Point Pinch     Trial 1: 8 lbs    Right Wrist/Hand      (2nd hand position)     Trial 1: 15 lbs    Trial 2: 16 lbs    Trial 3: 20 lbs    Average: 17 lbs    Thumb Strength   Key/Lateral Pinch     Trial 1: 7 lbs  Tip/Two-Point Pinch     Trial 1: 5 lbs  Palmar/Three-Point Pinch     Trial 1: 5 lbs    Ambulation     Comments   FWB, non-antalgic gait, no distress, no assistive device, no significant gait deviation, normal arm swing with gait.      Assessment & Plan     Assessment  Impairments: abnormal or restricted ROM, activity intolerance, impaired physical strength and pain with function  Assessment details: Significant improvement in both strength and ROM. All hand and digit AROM is WNL. Wrist only lacking in RD. Strength is improved but still lacking since this is her dominate hand. Did well with increase in there ex. Issued Red putty for HEP and issued RTB wrist 4-way for HEP.  Prognosis: fair  Prognosis details: Barriers to Rehab: Include significant or possible arthritic/degenerative changes that have occurred within the joint, Possible monetary/secondary gain.    Safety Issues: None noted.    Functional Limitations: carrying objects, sleeping, pulling, pushing, uncomfortable because of pain and unable to perform repetitive tasks  Goals  Plan Goals: Short Term Goals:  1) I with HEP and have additions/changes by next re-certification. (met)    2) AROM R wrist flexion>=75°. (met)    3) AROM R wrist extension>=55°. (met)    4) AROM R wrist UD >= 30°. (met)    5) AROM R wrist RD >= 20°. (not met)    6) R wrist MMT >= 4/5. (met)    7) R  at the #2 setting >= 10#. (met)    8) Patient able to make a full composite fist with digits 2-5. (met)    Long Term goals:  1) AROM for the R wrist all WNL, no increase  in pain. (partially met/ongoing)    2) B wrist >= 4+/5, no increase in pain. (met)    3) R  at the #2 setting >= 20#. (not met)    4) R Thumb oppostion to 5th finger tip. (met)    5) T Thumb abduction >= 75°. (met)    6) Patient able to perform 5 minutes of fine motor activities with no increase in pain. (ongoing)    7) R pinch strengths within 1# of the L. (ongoing)    8) I with final HEP      Plan  Therapy options: will be seen for skilled physical therapy services  Planned modality interventions: cryotherapy, fluidotherapy, thermotherapy (hydrocollator packs) and thermotherapy (paraffin bath)  Planned therapy interventions: body mechanics training, fine motor coordination training, flexibility, functional ROM exercises, home exercise program, manual therapy, soft tissue mobilization, strengthening, stretching and therapeutic activities  Duration in visits: 6  Treatment plan discussed with: patient  Plan details: Progress overall ROM, strength and endurance. Anticipate D/C in another 4-6 visits. Review 4-way wrist Tband next session.      Other therapeutic activities and/or exercises will be prescribed depending on the patients progress or lack there of.     Visit Diagnoses:    ICD-10-CM ICD-9-CM   1. Closed displaced fracture of distal phalanx of right thumb with routine healing, subsequent encounter  S62.521D V54.19       Progress toward previous goals: Partially Met        Recommendations: Continue with recommendations concentration primarily on strength/endurance  Timeframe: 3 weeks  Prognosis to achieve goals: good    PT Signature: Edna Wells, PT DPT, CSCS      Based upon review of the patient's progress and continued therapy plan, it is my medical opinion that Sophia Neff should continue physical therapy treatment at [facility]  [address] [place] [address] [phone].    Signature: __________________________________  Austin  Connor Posadas MD PhD    Timed:  Manual Therapy:         mins  54623;  Therapeutic Exercise:    32     mins  69850;     Aquatic Ther Ex:         mins  67610;     Neuromuscular Rohith:        mins  16687;    Therapeutic Activity:     25     mins  02483;     Gait Training:           mins  54306;     Ultrasound:          mins  72300;    Paraffin:        mins  80818;  Electrical Stimulation:         mins  26892 ( );    Untimed:  Electrical Stimulation:         mins  03595 ( );  Mechanical Traction:         mins  77349;     Timed Treatment:   57   mins   Total Treatment:     57   mins

## 2020-12-03 ENCOUNTER — TREATMENT (OUTPATIENT)
Dept: PHYSICAL THERAPY | Facility: CLINIC | Age: 75
End: 2020-12-03

## 2020-12-03 DIAGNOSIS — S62.521D CLOSED DISPLACED FRACTURE OF DISTAL PHALANX OF RIGHT THUMB WITH ROUTINE HEALING, SUBSEQUENT ENCOUNTER: Primary | ICD-10-CM

## 2020-12-03 PROCEDURE — 97110 THERAPEUTIC EXERCISES: CPT | Performed by: PHYSICAL THERAPIST

## 2020-12-03 NOTE — PROGRESS NOTES
Physical Therapy Daily Progress Note    Patient: Sophia Neff   : 1945  Diagnosis/ICD-10 Code:     Diagnosis Plan   1. Closed displaced fracture of distal phalanx of right thumb with routine healing, subsequent encounter       Referring practitioner: LILIBETH Chavez  Date of Initial Visit: Type: THERAPY  Noted: 10/28/2020  Today's Date: 12/3/2020  Patient seen for 10 sessions      PT Recheck Due: 2020  PT MD Visit: 2020       Sophia Neff reports: 80% improvement        Subjective Evaluation    History of Present Illness    Subjective comment: denies pain right now. reports that earlier today she had pain. Pain  Current pain ratin             Objective   See Exercise, Manual, and Modality Logs for complete treatment.       Assessment & Plan     Assessment  Assessment details: Patient completes 4 way wrist strengthening with no significant difficulties and is able to replicate with minimal to no cueing. Patient completes fine motor activities with fine motor chair with no complaints of increased pain during completion. Gives good effort throughout treatment.     Goals  Plan Goals: Short Term Goals:  1) I with HEP and have additions/changes by next re-certification. (met)    2) AROM R wrist flexion>=75°. (met)    3) AROM R wrist extension>=55°. (met)    4) AROM R wrist UD >= 30°. (met)    5) AROM R wrist RD >= 20°. (not met)    6) R wrist MMT >= 4/5. (met)    7) R  at the #2 setting >= 10#. (met)    8) Patient able to make a full composite fist with digits 2-5. (met)    Long Term goals:  1) AROM for the R wrist all WNL, no increase in pain. (partially met/ongoing)    2) B wrist >= 4+/5, no increase in pain. (met)    3) R  at the #2 setting >= 20#. (not met)    4) R Thumb oppostion to 5th finger tip. (met)    5) T Thumb abduction >= 75°. (met)    6) Patient able to perform 5 minutes of fine motor activities with no increase in pain. (met)    7) R pinch strengths  within 1# of the L. (ongoing)    8) I with final HEP    Plan  Plan details: Continue working on strength. assess      Progress per Plan of Care and Progress strengthening /stabilization /functional activity            Timed:  Manual Therapy:         mins  24137;  Therapeutic Exercise:    42     mins  45961;   Aquatic Therex :        mins  92798    Neuromuscular Rohith:        mins  81651;    Therapeutic Activity:     5     mins  98687;     Gait Training:           mins  70485;     Ultrasound:          mins  60650;    Electrical Stimulation:         mins  06649 ( );    Untimed:  Electrical Stimulation:         mins  11535 ( );  Mechanical Traction:         mins  36578;   Orthotic fit/train:         mins  14529    Timed Treatment:   47   mins   Total Treatment:     47   mins  Paula Ahumada PTA  Physical Therapist Assistant

## 2020-12-08 ENCOUNTER — TREATMENT (OUTPATIENT)
Dept: PHYSICAL THERAPY | Facility: CLINIC | Age: 75
End: 2020-12-08

## 2020-12-08 DIAGNOSIS — S62.521D CLOSED DISPLACED FRACTURE OF DISTAL PHALANX OF RIGHT THUMB WITH ROUTINE HEALING, SUBSEQUENT ENCOUNTER: Primary | ICD-10-CM

## 2020-12-08 PROCEDURE — 97110 THERAPEUTIC EXERCISES: CPT | Performed by: PHYSICAL THERAPIST

## 2020-12-08 PROCEDURE — 97530 THERAPEUTIC ACTIVITIES: CPT | Performed by: PHYSICAL THERAPIST

## 2020-12-08 NOTE — PROGRESS NOTES
Physical Therapy Daily Progress Note    Patient: Sophia Neff   : 1945  Diagnosis/ICD-10 Code:     Diagnosis Plan   1. Closed displaced fracture of distal phalanx of right thumb with routine healing, subsequent encounter       Referring practitioner: SHAYY Chavez*  Date of Initial Visit: Type: THERAPY  Noted: 10/28/2020  Today's Date: 2020  Patient seen for 11 sessions      PT Recheck Due: 2020  PT MD Visit: 2020       Sophia Neff reports: 85% improvement      Subjective Evaluation    History of Present Illness    Subjective comment: patient denies pain right this moment, states that it get sore along her thumb some. Pain  Current pain ratin             Objective          Active Range of Motion     Left Wrist   Wrist flexion: 70 degrees   Wrist extension: 74 degrees   Radial deviation: 22 degrees   Ulnar deviation: 34 degrees     Right Wrist   Wrist flexion: 74 degrees   Wrist extension: 70 degrees   Radial deviation: 22 degrees   Ulnar deviation: 36 degrees     Strength/Myotome Testing     Left Wrist/Hand   Normal wrist strength  Wrist extension: 5  Wrist flexion: 5  Radial deviation: 5  Ulnar deviation: 5     (2nd hand position)    Trial 1: 30 lbs    Trial 2: 30 lbs    Trial 3: 25 lbs    Average: 28.33 lbs    Thumb Strength  Key/Lateral Pinch     Trial 1: 11 lbs    Trial 2: 12 lbs    Trial 3: 11 lbs    Average: 11.33 lbs  Tip/Two-Point Pinch     Trial 1: 8 lbs    Trial 2: 8 lbs    Trial 3: 8 lbs    Average: 8 lbs  Palmar/Three-Point Pinch     Trial 1: 8 lbs    Trial 2: 8 lbs    Trial 3: 10 lbs    Average: 8.67 lbs    Right Wrist/Hand   Normal wrist strength  Wrist extension: 5  Wrist flexion: 5  Radial deviation: 5  Ulnar deviation: 5     (2nd hand position)     Trial 1: 30 lbs    Trial 2: 32 lbs    Trial 3: 35 lbs    Average: 32.33 lbs    Thumb Strength   Key/Lateral Pinch     Trial 1: 8 lbs    Trial 2: 9 lbs    Trial 3: 8 lbs    Average: 8.33  lbs  Tip/Two-Point Pinch     Trial 1: 6 lbs    Trial 2: 6 lbs    Trial 3: 6 lbs    Average: 6 lbs  Palmar/Three-Point Pinch     Trial 1: 4 lbs    Trial 2: 5 lbs    Trial 3: 6 lbs    Average: 5 lbs      See Exercise, Manual, and Modality Logs for complete treatment.       Assessment & Plan     Assessment  Assessment details: Patient has improved  strength this treatment with right  now greater than left  with patient being right handed. Patient still has difficulty with pinching and has 2-3# difference between each hand in each position. Patient appears independent with her HEP at this time. Patient has equal wrist ROM bilaterally. Appears to be independent and compliant and HEP    Goals  Plan Goals: Short Term Goals:  1) I with HEP and have additions/changes by next re-certification. (met)    2) AROM R wrist flexion>=75°. (met)    3) AROM R wrist extension>=55°. (met)    4) AROM R wrist UD >= 30°. (met)    5) AROM R wrist RD >= 20°. (met)    6) R wrist MMT >= 4/5. (met)    7) R  at the #2 setting >= 10#. (met)    8) Patient able to make a full composite fist with digits 2-5. (met)    Long Term goals:  1) AROM for the R wrist all WNL, no increase in pain. (met)    2) B wrist >= 4+/5, no increase in pain. (met)    3) R  at the #2 setting >= 20#. (met)    4) R Thumb oppostion to 5th finger tip. (met)    5) T Thumb abduction >= 75°. (met)    6) Patient able to perform 5 minutes of fine motor activities with no increase in pain. (met)    7) R pinch strengths within 1# of the L. (ongoing)    8) I with final HEP ( met)    Plan  Plan details: Anticipate discharge at next visit.       Anticipate DC next Visit            Timed:  Manual Therapy:         mins  46827;  Therapeutic Exercise:    43     mins  91617;   Aquatic Therex :        mins  33711    Neuromuscular Rohith:        mins  38848;    Therapeutic Activity:     20     mins  08669;     Gait Training:           mins  24819;     Ultrasound:           mins  80547;    Electrical Stimulation:         mins  34058 ( );    Untimed:  Electrical Stimulation:         mins  94293 ( );  Mechanical Traction:         mins  09724;   Orthotic fit/train:         mins  60466    Timed Treatment:   63   mins   Total Treatment:     63   mins  Paula Ahumada PTA  Physical Therapist Assistant

## 2020-12-10 ENCOUNTER — DOCUMENTATION (OUTPATIENT)
Dept: PHYSICAL THERAPY | Facility: CLINIC | Age: 75
End: 2020-12-10

## 2020-12-10 DIAGNOSIS — S62.521D CLOSED DISPLACED FRACTURE OF DISTAL PHALANX OF RIGHT THUMB WITH ROUTINE HEALING, SUBSEQUENT ENCOUNTER: Primary | ICD-10-CM

## 2020-12-10 NOTE — PROGRESS NOTES
Discharge Summary  Discharge Summary from Physical Therapy Report      Dates of last PT visit: 12-8-2020  Number of Visits: 11 visits     Discharge Status of Patient: See PT Note dated 12-    Goals: All Met    Discharge Plan: patient's exercises were reviewed over the telephone this date with patient verbalizing understanding of her HEP     Date of Discharge: 12-        Paula Ahumada, PTA  Physical Therapist Assistant

## 2021-03-12 ENCOUNTER — LAB (OUTPATIENT)
Dept: LAB | Facility: HOSPITAL | Age: 76
End: 2021-03-12

## 2021-03-12 ENCOUNTER — TELEMEDICINE (OUTPATIENT)
Dept: ENDOCRINOLOGY | Facility: CLINIC | Age: 76
End: 2021-03-12

## 2021-03-12 DIAGNOSIS — E11.22 TYPE 2 DIABETES MELLITUS WITH STAGE 3A CHRONIC KIDNEY DISEASE, WITHOUT LONG-TERM CURRENT USE OF INSULIN (HCC): Primary | ICD-10-CM

## 2021-03-12 DIAGNOSIS — N18.31 TYPE 2 DIABETES MELLITUS WITH STAGE 3A CHRONIC KIDNEY DISEASE, WITHOUT LONG-TERM CURRENT USE OF INSULIN (HCC): Primary | ICD-10-CM

## 2021-03-12 DIAGNOSIS — E78.2 MIXED DIABETIC HYPERLIPIDEMIA ASSOCIATED WITH TYPE 1 DIABETES MELLITUS (HCC): ICD-10-CM

## 2021-03-12 DIAGNOSIS — E55.9 VITAMIN D DEFICIENCY: ICD-10-CM

## 2021-03-12 DIAGNOSIS — E10.69 MIXED DIABETIC HYPERLIPIDEMIA ASSOCIATED WITH TYPE 1 DIABETES MELLITUS (HCC): ICD-10-CM

## 2021-03-12 DIAGNOSIS — E11.59 HYPERTENSION ASSOCIATED WITH DIABETES (HCC): ICD-10-CM

## 2021-03-12 DIAGNOSIS — I15.2 HYPERTENSION ASSOCIATED WITH DIABETES (HCC): ICD-10-CM

## 2021-03-12 PROCEDURE — 99214 OFFICE O/P EST MOD 30 MIN: CPT | Performed by: INTERNAL MEDICINE

## 2021-03-12 PROCEDURE — 85025 COMPLETE CBC W/AUTO DIFF WBC: CPT | Performed by: INTERNAL MEDICINE

## 2021-03-12 PROCEDURE — 84443 ASSAY THYROID STIM HORMONE: CPT | Performed by: INTERNAL MEDICINE

## 2021-03-12 PROCEDURE — 83036 HEMOGLOBIN GLYCOSYLATED A1C: CPT | Performed by: INTERNAL MEDICINE

## 2021-03-12 PROCEDURE — 82306 VITAMIN D 25 HYDROXY: CPT | Performed by: INTERNAL MEDICINE

## 2021-03-12 PROCEDURE — 80053 COMPREHEN METABOLIC PANEL: CPT | Performed by: INTERNAL MEDICINE

## 2021-03-12 PROCEDURE — 80061 LIPID PANEL: CPT | Performed by: INTERNAL MEDICINE

## 2021-03-12 PROCEDURE — 82607 VITAMIN B-12: CPT | Performed by: INTERNAL MEDICINE

## 2021-03-12 NOTE — PROGRESS NOTES
" Sophia Neff is a 75 y.o. female who presents for  evaluation of   Diabetes                                      This was a Telehealth Encounter. Benefits and Disadvantages of a Telehealth Visit were discussed and accepted by patient. .  Patient agreed to receive service through Telehealth visit as patient is being compliant with social distancing recommendations imparted by CDC.     You have chosen to receive care through a telehealth visit.  Do you consent to use a video/audio connection for your medical care today? Yes      Referring provider    Primary Care Provider    Antonietta Singh MD    Duration 10 years    Timing - Diabetes is Constant    Quality -  Well controlled    Severity -  moderate    Complications - CKD stage III    Current symptoms/problems  none     Alleviating Factors: Compliance       Side Effects  Metformin     Januvia too weak     Current diet  in general, a \"healthy\" diet      Current exercise walking    Current monitoring regimen: home blood tests - checking 1 x daily   Home blood sugar records:     Hypoglycemia if skipped meals     Past Medical History:   Diagnosis Date   • Allergic    • Anemia    • Arthritis    • Bell's palsy    • Chronic kidney disease    • Controlled type 2 diabetes mellitus without complication, without long-term current use of insulin (CMS/[facility]) 2019   • Diabetes mellitus (CMS/[facility])    • Fibromyalgia    • High cholesterol    • Hypertension    • 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    • Plantar fasciitis     Bilateral     Family History   Problem Relation Age of Onset   • Diabetes Sister    • Hyperlipidemia Sister    • Arthritis Sister    • Diabetes Brother    • Cancer Brother    • Cancer Paternal Aunt    • Depression Paternal Grandfather      Social History     Tobacco Use   • Smoking status: Former Smoker     Quit date:      Years since quittin.2   • Smokeless tobacco: Never Used   Substance Use Topics   • " Alcohol use: Yes     Comment: 6x per year   • Drug use: Not Currently         Current Outpatient Medications:   •  acetaminophen (TYLENOL) 325 MG tablet, Tylenol 325 mg tablet  Take 2 by mouth, Disp: , Rfl:   •  calcium carbonate (OS-JESICA) 600 MG tablet, Take 600 mg by mouth 2 (Two) Times a Day With Meals., Disp: , Rfl:   •  diclofenac (VOLTAREN) 1 % gel gel, Apply 4 g topically to the appropriate area as directed 2 (Two) Times a Day., Disp: , Rfl:   •  Dulaglutide (Trulicity) 3 MG/0.5ML solution pen-injector, Inject 3 mg under the skin into the appropriate area as directed 1 (One) Time Per Week., Disp: 12 pen, Rfl: 3  •  Empagliflozin (Jardiance) 10 MG tablet, Take 10 mg by mouth Daily., Disp: 90 tablet, Rfl: 3  •  FREESTYLE LITE test strip, Use to test sugar 3 times daily, Disp: 300 each, Rfl: 3  •  hydrALAZINE (APRESOLINE) 10 MG tablet, 10 mg 3 (Three) Times a Day., Disp: , Rfl:   •  Lancets (FREESTYLE) lancets, Use to test sugar 3 times daily, Disp: 300 each, Rfl: 3  •  loratadine (CLARITIN) 10 MG tablet, loratadine 10 mg tablet  Take 1 by mouth, Disp: , Rfl:   •  losartan (COZAAR) 100 MG tablet, Take 100 mg by mouth Daily., Disp: , Rfl:   •  Multiple Vitamins-Minerals (ONE-A-DAY WOMENS 50+ ADVANTAGE PO), One-A-Day Womens Formula 18 mg iron-400 mcg-500 mg tablet  Take 1 by mouth once a day, Disp: , Rfl:   •  pravastatin (PRAVACHOL) 40 MG tablet, pravastatin 40 mg tablet, Disp: , Rfl:   •  RESTASIS 0.05 % ophthalmic emulsion, , Disp: , Rfl:   •  triamcinolone (KENALOG) 0.1 % ointment, triamcinolone acetonide 0.1 % topical ointment, Disp: , Rfl:     Review of Systems    Review of Systems     Objective:   There were no vitals taken for this visit.    Physical Exam   HENT:   Head: Normocephalic.   Abdominal: Normal appearance.   Musculoskeletal:      Right lower leg: No edema.      Left lower leg: No edema.   Neurological: She is alert.       Lab Review    Results for orders placed or performed in visit on 11/16/20    POC Glycosylated Hemoglobin (Hb A1C)    Specimen: Blood   Result Value Ref Range    Hemoglobin A1C 6.4 %         Assessment/Plan       ICD-10-CM ICD-9-CM   1. Type 2 diabetes mellitus with stage 3a chronic kidney disease, without long-term current use of insulin (CMS/HCC)  E11.22 250.40    N18.31 585.3   2. Hypertension associated with diabetes (CMS/HCC)  E11.59 250.80    I15.2 401.9   3. Mixed diabetic hyperlipidemia associated with type 1 diabetes mellitus (CMS/[facility])  E10.69 250.81    E78.2 272.2   4. Vitamin D deficiency  E55.9 268.9         I reviewed and summarized records from Antonietta Singh MD from current year  and I reviewed / ordered labs.   From review of records :    Pt has type 2 diabetes and was having hypoglycemia   Glycemic Management:   Lab Results   Component Value Date    HGBA1C 6.4 11/16/2020    HGBA1C 5.9 10/21/2019    HGBA1C 6.7 08/12/2019     No results found for: GLUCOSE, BUN, CREATININE, EGFRIFNONA, EGFRIFAFRI, BCR, K, CO2, CALCIUM, PROTENTOTREF, ALBUMIN, LABIL2, BILIRUBIN, AST, ALT, ANIONGAP  No results found for: WBC, HGB, HCT, MCV, PLT    trulicity and jardiance ( I wanted invokana due to [employer] but insurance paid for jardiance )  Restart ozempic, gained more weight on trulicity   Maritza turcios wouldn't approve   Increase trulicity to 3 mg     Lipid Management  No results found for: CHOL  No results found for: TRIG  No results found for: HDL  No components found for: LDLCALC  No results found for: LDL  No results found for: LDLDIRECT    Pravastatin 40 mg qhs     Blood Pressure Management:    There were no vitals filed for this visit.  No results found for: GLUCOSE, CALCIUM, NA, K, CO2, CL, BUN, CREATININE, EGFRIFAFRI, EGFRIFNONA, BCR, ANIONGAP        Nl bp on hydralazine and losartan    On chlorthalidone, stopped before by me when I added jardiance   Elevated today but recent thumb fracture   Checks bp at home and running     Microvascular Complication Monitoring:       Eye Exam Evaluation  Within 1 year   -----------    Last Microalbumin-Proteinuria Assessment    No results found for: MALBCRERATIO    No results found for: UTPCR    -----------      Neuropathy, none            Weight Related:   Wt Readings from Last 3 Encounters:   11/16/20 77.7 kg (171 lb 3.2 oz)   06/08/20 77.6 kg (171 lb)   11/18/19 71.6 kg (157 lb 14.4 oz)     There is no height or weight on file to calculate BMI.        Diet interventions: moderate (500 kCal/d) deficit diet.      Bone Health    No results found for: PTH, CALCIUM, CAION, PHOS, [id number]    Thyroid Health    No results found for: TSH         Other Diabetes Related Aspects       No results found for: [id number]         No orders of the defined types were placed in this encounter.        A copy of my note was sent to Antonietta Singh MD    Please see my above opinion and suggestions.       I spent 15 minutes reviewing patient electronic chart , reviewing medications , past history , active problems.   I provided advice regarding management of medical conditions, refilled prescriptions , ordered labs and arranged for future appointment.   Patient was advised to contact us if there were any unanswered questions or ongoing concerns.

## 2021-03-13 LAB
25(OH)D3 SERPL-MCNC: 51.8 NG/ML (ref 30–100)
ALBUMIN SERPL-MCNC: 4.2 G/DL (ref 3.5–5.2)
ALBUMIN/GLOB SERPL: 1.6 G/DL
ALP SERPL-CCNC: 100 U/L (ref 39–117)
ALT SERPL W P-5'-P-CCNC: 18 U/L (ref 1–33)
ANION GAP SERPL CALCULATED.3IONS-SCNC: 10.3 MMOL/L (ref 5–15)
AST SERPL-CCNC: 25 U/L (ref 1–32)
BASOPHILS # BLD AUTO: 0.05 10*3/MM3 (ref 0–0.2)
BASOPHILS NFR BLD AUTO: 0.9 % (ref 0–1.5)
BILIRUB SERPL-MCNC: 0.3 MG/DL (ref 0–1.2)
BUN SERPL-MCNC: 20 MG/DL (ref 8–23)
BUN/CREAT SERPL: 19.2 (ref 7–25)
CALCIUM SPEC-SCNC: 9.3 MG/DL (ref 8.6–10.5)
CHLORIDE SERPL-SCNC: 104 MMOL/L (ref 98–107)
CHOLEST SERPL-MCNC: 147 MG/DL (ref 0–200)
CO2 SERPL-SCNC: 25.7 MMOL/L (ref 22–29)
CREAT SERPL-MCNC: 1.04 MG/DL (ref 0.57–1)
DEPRECATED RDW RBC AUTO: 41.2 FL (ref 37–54)
EOSINOPHIL # BLD AUTO: 0.11 10*3/MM3 (ref 0–0.4)
EOSINOPHIL NFR BLD AUTO: 2 % (ref 0.3–6.2)
ERYTHROCYTE [DISTWIDTH] IN BLOOD BY AUTOMATED COUNT: 13.3 % (ref 12.3–15.4)
GFR SERPL CREATININE-BSD FRML MDRD: 52 ML/MIN/1.73
GLOBULIN UR ELPH-MCNC: 2.7 GM/DL
GLUCOSE SERPL-MCNC: 101 MG/DL (ref 65–99)
HBA1C MFR BLD: 6.36 % (ref 4.8–5.6)
HCT VFR BLD AUTO: 36.4 % (ref 34–46.6)
HDLC SERPL-MCNC: 47 MG/DL (ref 40–60)
HGB BLD-MCNC: 12.2 G/DL (ref 12–15.9)
IMM GRANULOCYTES # BLD AUTO: 0 10*3/MM3 (ref 0–0.05)
IMM GRANULOCYTES NFR BLD AUTO: 0 % (ref 0–0.5)
LDLC SERPL CALC-MCNC: 77 MG/DL (ref 0–100)
LDLC/HDLC SERPL: 1.57 {RATIO}
LYMPHOCYTES # BLD AUTO: 1.39 10*3/MM3 (ref 0.7–3.1)
LYMPHOCYTES NFR BLD AUTO: 24.7 % (ref 19.6–45.3)
MCH RBC QN AUTO: 29 PG (ref 26.6–33)
MCHC RBC AUTO-ENTMCNC: 33.5 G/DL (ref 31.5–35.7)
MCV RBC AUTO: 86.7 FL (ref 79–97)
MONOCYTES # BLD AUTO: 0.45 10*3/MM3 (ref 0.1–0.9)
MONOCYTES NFR BLD AUTO: 8 % (ref 5–12)
NEUTROPHILS NFR BLD AUTO: 3.62 10*3/MM3 (ref 1.7–7)
NEUTROPHILS NFR BLD AUTO: 64.4 % (ref 42.7–76)
NRBC BLD AUTO-RTO: 0.4 /100 WBC (ref 0–0.2)
PLATELET # BLD AUTO: 191 10*3/MM3 (ref 140–450)
PMV BLD AUTO: 11.7 FL (ref 6–12)
POTASSIUM SERPL-SCNC: 4.2 MMOL/L (ref 3.5–5.2)
PROT SERPL-MCNC: 6.9 G/DL (ref 6–8.5)
RBC # BLD AUTO: 4.2 10*6/MM3 (ref 3.77–5.28)
SODIUM SERPL-SCNC: 140 MMOL/L (ref 136–145)
TRIGL SERPL-MCNC: 131 MG/DL (ref 0–150)
TSH SERPL DL<=0.05 MIU/L-ACNC: 1.49 UIU/ML (ref 0.27–4.2)
VIT B12 BLD-MCNC: 554 PG/ML (ref 211–946)
VLDLC SERPL-MCNC: 23 MG/DL (ref 5–40)
WBC # BLD AUTO: 5.62 10*3/MM3 (ref 3.4–10.8)

## 2021-03-15 ENCOUNTER — LAB (OUTPATIENT)
Dept: LAB | Facility: HOSPITAL | Age: 76
End: 2021-03-15

## 2021-03-15 DIAGNOSIS — I15.2 HYPERTENSION ASSOCIATED WITH DIABETES (HCC): ICD-10-CM

## 2021-03-15 DIAGNOSIS — E11.59 HYPERTENSION ASSOCIATED WITH DIABETES (HCC): ICD-10-CM

## 2021-03-15 DIAGNOSIS — E78.2 MIXED DIABETIC HYPERLIPIDEMIA ASSOCIATED WITH TYPE 2 DIABETES MELLITUS (HCC): ICD-10-CM

## 2021-03-15 DIAGNOSIS — N18.2 CKD (CHRONIC KIDNEY DISEASE), STAGE II: ICD-10-CM

## 2021-03-15 DIAGNOSIS — E55.9 VITAMIN D DEFICIENCY: ICD-10-CM

## 2021-03-15 DIAGNOSIS — E11.69 MIXED DIABETIC HYPERLIPIDEMIA ASSOCIATED WITH TYPE 2 DIABETES MELLITUS (HCC): ICD-10-CM

## 2021-03-15 DIAGNOSIS — E11.22 TYPE 2 DIABETES MELLITUS WITH STAGE 3 CHRONIC KIDNEY DISEASE, WITHOUT LONG-TERM CURRENT USE OF INSULIN (HCC): ICD-10-CM

## 2021-03-15 DIAGNOSIS — N18.30 TYPE 2 DIABETES MELLITUS WITH STAGE 3 CHRONIC KIDNEY DISEASE, WITHOUT LONG-TERM CURRENT USE OF INSULIN (HCC): ICD-10-CM

## 2021-03-15 LAB
ALBUMIN UR-MCNC: 15.9 MG/DL
CREAT UR-MCNC: 51.8 MG/DL
MICROALBUMIN/CREAT UR: 306.9 MG/G

## 2021-03-15 PROCEDURE — 82043 UR ALBUMIN QUANTITATIVE: CPT | Performed by: INTERNAL MEDICINE

## 2021-03-15 PROCEDURE — 82570 ASSAY OF URINE CREATININE: CPT | Performed by: INTERNAL MEDICINE

## 2021-07-15 ENCOUNTER — OFFICE VISIT (OUTPATIENT)
Dept: ENDOCRINOLOGY | Facility: CLINIC | Age: 76
End: 2021-07-15

## 2021-07-15 VITALS
WEIGHT: 173 LBS | SYSTOLIC BLOOD PRESSURE: 148 MMHG | OXYGEN SATURATION: 97 % | BODY MASS INDEX: 31.83 KG/M2 | HEIGHT: 62 IN | HEART RATE: 85 BPM | DIASTOLIC BLOOD PRESSURE: 70 MMHG

## 2021-07-15 DIAGNOSIS — N18.31 TYPE 2 DIABETES MELLITUS WITH STAGE 3A CHRONIC KIDNEY DISEASE, WITHOUT LONG-TERM CURRENT USE OF INSULIN (HCC): Primary | ICD-10-CM

## 2021-07-15 DIAGNOSIS — E11.22 TYPE 2 DIABETES MELLITUS WITH STAGE 3A CHRONIC KIDNEY DISEASE, WITHOUT LONG-TERM CURRENT USE OF INSULIN (HCC): Primary | ICD-10-CM

## 2021-07-15 DIAGNOSIS — E78.2 MIXED DIABETIC HYPERLIPIDEMIA ASSOCIATED WITH TYPE 1 DIABETES MELLITUS (HCC): ICD-10-CM

## 2021-07-15 DIAGNOSIS — E10.69 MIXED DIABETIC HYPERLIPIDEMIA ASSOCIATED WITH TYPE 1 DIABETES MELLITUS (HCC): ICD-10-CM

## 2021-07-15 DIAGNOSIS — E55.9 VITAMIN D DEFICIENCY: ICD-10-CM

## 2021-07-15 DIAGNOSIS — N18.2 CKD (CHRONIC KIDNEY DISEASE), STAGE II: ICD-10-CM

## 2021-07-15 DIAGNOSIS — I15.2 HYPERTENSION ASSOCIATED WITH DIABETES (HCC): ICD-10-CM

## 2021-07-15 DIAGNOSIS — E11.59 HYPERTENSION ASSOCIATED WITH DIABETES (HCC): ICD-10-CM

## 2021-07-15 PROCEDURE — 99214 OFFICE O/P EST MOD 30 MIN: CPT | Performed by: INTERNAL MEDICINE

## 2021-07-15 RX ORDER — HYDRALAZINE HYDROCHLORIDE 25 MG/1
25 TABLET, FILM COATED ORAL 3 TIMES DAILY
COMMUNITY

## 2021-07-15 RX ORDER — DULAGLUTIDE 4.5 MG/.5ML
4.5 INJECTION, SOLUTION SUBCUTANEOUS WEEKLY
Qty: 4 PEN | Refills: 11 | Status: SHIPPED | OUTPATIENT
Start: 2021-07-15 | End: 2022-01-09 | Stop reason: SDUPTHER

## 2021-07-15 NOTE — PROGRESS NOTES
" Sophia Neff is a 76 y.o. female who presents for  evaluation of   Diabetes                                    Referring provider    Primary Care Provider    Antonietta Singh MD    Duration 10 years    Timing - Diabetes is Constant    Quality -  Well controlled    Severity -  moderate    Complications - CKD stage III    Current symptoms/problems  none     Alleviating Factors: Compliance       Side Effects  Metformin     Januvia too weak     Current diet  in general, a \"healthy\" diet      Current exercise walking    Current monitoring regimen: home blood tests - checking 1 x daily   Home blood sugar records:     Hypoglycemia if skipped meals     Past Medical History:   Diagnosis Date   • Allergic    • Anemia    • Arthritis    • Bell's palsy    • Chronic kidney disease    • Controlled type 2 diabetes mellitus without complication, without long-term current use of insulin (CMS/[facility]) 2019   • Diabetes mellitus (CMS/[facility])    • Fibromyalgia    • High cholesterol    • Hypertension    • 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    • Plantar fasciitis     Bilateral     Family History   Problem Relation Age of Onset   • Diabetes Sister    • Hyperlipidemia Sister    • Arthritis Sister    • Diabetes Brother    • Cancer Brother    • Cancer Paternal Aunt    • Depression Paternal Grandfather      Social History     Tobacco Use   • Smoking status: Former Smoker     Quit date:      Years since quittin.5   • Smokeless tobacco: Never Used   Substance Use Topics   • Alcohol use: Yes     Comment: 6x per year   • Drug use: Not Currently         Current Outpatient Medications:   •  acetaminophen (TYLENOL) 325 MG tablet, Tylenol 325 mg tablet  Take 2 by mouth, Disp: , Rfl:   •  calcium carbonate (OS-JESICA) 600 MG tablet, Take 600 mg by mouth 2 (Two) Times a Day With Meals., Disp: , Rfl:   •  diclofenac (VOLTAREN) 1 % gel gel, Apply 4 g topically to the appropriate area as directed 2 (Two) " "Times a Day., Disp: , Rfl:   •  Dulaglutide (Trulicity) 3 MG/0.5ML solution pen-injector, Inject 3 mg under the skin into the appropriate area as directed 1 (One) Time Per Week., Disp: 12 pen, Rfl: 3  •  Empagliflozin (Jardiance) 10 MG tablet, Take 10 mg by mouth Daily., Disp: 90 tablet, Rfl: 3  •  fluocinonide (LIDEX) 0.05 % cream, Apply  topically to the appropriate area as directed 2 (Two) Times a Day., Disp: , Rfl:   •  hydrALAZINE (APRESOLINE) 25 MG tablet, Take 25 mg by mouth 3 (Three) Times a Day., Disp: , Rfl:   •  loratadine (CLARITIN) 10 MG tablet, loratadine 10 mg tablet  Take 1 by mouth, Disp: , Rfl:   •  losartan (COZAAR) 100 MG tablet, Take 100 mg by mouth Daily., Disp: , Rfl:   •  Multiple Vitamins-Minerals (ONE-A-DAY WOMENS 50+ ADVANTAGE PO), One-A-Day Womens Formula 18 mg iron-400 mcg-500 mg tablet  Take 1 by mouth once a day, Disp: , Rfl:   •  pravastatin (PRAVACHOL) 40 MG tablet, pravastatin 40 mg tablet, Disp: , Rfl:   •  RESTASIS 0.05 % ophthalmic emulsion, , Disp: , Rfl:   •  triamcinolone (KENALOG) 0.1 % ointment, triamcinolone acetonide 0.1 % topical ointment, Disp: , Rfl:     Review of Systems    Review of Systems     Objective:   /70   Pulse 85   Ht 157.5 cm (62\")   Wt 78.5 kg (173 lb)   SpO2 97%   BMI 31.64 kg/m²     Physical Exam   HENT:   Head: Normocephalic.   Abdominal: Normal appearance.   Musculoskeletal:      Right lower leg: No edema.      Left lower leg: No edema.   Neurological: She is alert.       Lab Review    Results for orders placed or performed in visit on 11/16/20   POC Glycosylated Hemoglobin (Hb A1C)    Specimen: Blood   Result Value Ref Range    Hemoglobin A1C 6.4 %         Assessment/Plan       ICD-10-CM ICD-9-CM   1. Type 2 diabetes mellitus with stage 3a chronic kidney disease, without long-term current use of insulin (CMS/[facility])  E11.22 250.40    N18.31 585.3   2. Mixed diabetic hyperlipidemia associated with type 1 diabetes mellitus (CMS/HCC)  E10.69 " 250.81    E78.2 272.2   3. Hypertension associated with diabetes (CMS/[facility])  E11.59 250.80    I15.2 401.9   4. Vitamin D deficiency  E55.9 268.9   5. CKD (chronic kidney disease), stage II  N18.2 585.2         I reviewed and summarized records from Antonietta Singh MD from current year  and I reviewed / ordered labs.   From review of records :    Pt has type 2 diabetes and was having hypoglycemia   Glycemic Management:   Lab Results   Component Value Date    HGBA1C 6.36 (H) 03/12/2021    HGBA1C 6.4 11/16/2020    HGBA1C 5.9 10/21/2019     Lab Results   Component Value Date    GLUCOSE 101 (H) 03/12/2021    BUN 20 03/12/2021    CREATININE 1.04 (H) 03/12/2021    EGFRIFNONA 52 (L) 03/12/2021    BCR 19.2 03/12/2021    K 4.2 03/12/2021    CO2 25.7 03/12/2021    CALCIUM 9.3 03/12/2021    ALBUMIN 4.20 03/12/2021    AST 25 03/12/2021    ALT 18 03/12/2021    ANIONGAP 10.3 03/12/2021     Lab Results   Component Value Date    WBC 5.62 03/12/2021    HGB 12.2 03/12/2021    HCT 36.4 03/12/2021    MCV 86.7 03/12/2021     03/12/2021       trulicity and jardiance ( I wanted invokana due to [employer] but insurance paid for jardiance )  Restart ozempic, gained more weight on trulicity   Maritza turcios wouldn't approve   Increase trulicity to 3 mg     Lipid Management  Lab Results   Component Value Date    CHOL 147 03/12/2021     Lab Results   Component Value Date    TRIG 131 03/12/2021     Lab Results   Component Value Date    HDL 47 03/12/2021     No components found for: LDLCALC  Lab Results   Component Value Date    LDL 77 03/12/2021     No results found for: LDLDIRECT    Pravastatin 40 mg qhs     Blood Pressure Management:    Vitals:    07/15/21 1435   BP: 148/70   Pulse: 85   SpO2: 97%     Lab Results   Component Value Date    GLUCOSE 101 (H) 03/12/2021    CALCIUM 9.3 03/12/2021     03/12/2021    K 4.2 03/12/2021    CO2 25.7 03/12/2021     03/12/2021    BUN 20 03/12/2021    CREATININE 1.04 (H) 03/12/2021     EGFRIFNONA 52 (L) 03/12/2021    BCR 19.2 03/12/2021    ANIONGAP 10.3 03/12/2021           Nl bp on hydralazine and losartan    On chlorthalidone, stopped before by me when I added jardiance   Elevated today but recent thumb fracture   Checks bp at home and running     Microvascular Complication Monitoring:      Eye Exam Evaluation  Within 1 year   -----------    Last Microalbumin-Proteinuria Assessment    Lab Results   Component Value Date    MALBCRERATIO 306.9 03/15/2021       No results found for: UTPCR    -----------      Neuropathy, none            Weight Related:   Wt Readings from Last 3 Encounters:   07/15/21 78.5 kg (173 lb)   11/16/20 77.7 kg (171 lb 3.2 oz)   06/08/20 77.6 kg (171 lb)     Body mass index is 31.64 kg/m².        Diet interventions: moderate (500 kCal/d) deficit diet.      Bone Health    Lab Results   Component Value Date    CALCIUM 9.3 03/12/2021    [id number] 51.8 03/12/2021       Thyroid Health    Lab Results   Component Value Date    TSH 1.490 03/12/2021            Other Diabetes Related Aspects       Lab Results   Component Value Date    [id number] 554 03/12/2021            No orders of the defined types were placed in this encounter.        A copy of my note was sent to Antonietta Singh MD    Please see my above opinion and suggestions.       I spent 15 minutes reviewing patient electronic chart , reviewing medications , past history , active problems.   I provided advice regarding management of medical conditions, refilled prescriptions , ordered labs and arranged for future appointment.   Patient was advised to contact us if there were any unanswered questions or ongoing concerns.

## 2021-07-19 ENCOUNTER — TELEPHONE (OUTPATIENT)
Dept: ENDOCRINOLOGY | Facility: CLINIC | Age: 76
End: 2021-07-19

## 2021-07-19 NOTE — TELEPHONE ENCOUNTER
Pt left a vm stating that she is needing a refill of     Empagliflozin (Jardiance) 10 MG tablet [[id number]] (Order [id number])    Sent to     [facility] - [place], [place] - [address] - [phone]  - [phone] FX

## 2021-08-04 ENCOUNTER — TELEPHONE (OUTPATIENT)
Dept: ENDOCRINOLOGY | Facility: CLINIC | Age: 76
End: 2021-08-04

## 2021-08-04 NOTE — TELEPHONE ENCOUNTER
Pt called left VM that Pharmacy won't send request   Needs new script for     FreeStyle Lite Glucose test strips and Lancets  Pt says is completely out     Send to [facility]         and wants us to call when it has been done

## 2021-08-06 ENCOUNTER — TELEPHONE (OUTPATIENT)
Dept: ENDOCRINOLOGY | Facility: CLINIC | Age: 76
End: 2021-08-06

## 2021-08-06 NOTE — TELEPHONE ENCOUNTER
Pt called again  left VM that Pharmacy won't send request   Needs new script for      FreeStyle Lite Glucose test strips and Lancets  Pt says is completely out      Send to [facility]            and wants someone  to call when it has been done       Pt is calling again says she is out of the STRIPS and Lancets Wednesday  in a few days and has to drive to [facility] to  and needs to know when we get done

## 2021-08-08 RX ORDER — BLOOD-GLUCOSE METER
KIT MISCELLANEOUS
Qty: 120 EACH | Refills: 11 | Status: SHIPPED | OUTPATIENT
Start: 2021-08-08

## 2021-11-08 ENCOUNTER — OFFICE VISIT (OUTPATIENT)
Dept: ENDOCRINOLOGY | Facility: CLINIC | Age: 76
End: 2021-11-08

## 2021-11-08 VITALS
SYSTOLIC BLOOD PRESSURE: 130 MMHG | OXYGEN SATURATION: 96 % | HEIGHT: 62 IN | DIASTOLIC BLOOD PRESSURE: 80 MMHG | WEIGHT: 165 LBS | BODY MASS INDEX: 30.36 KG/M2 | HEART RATE: 80 BPM

## 2021-11-08 DIAGNOSIS — E78.2 MIXED DIABETIC HYPERLIPIDEMIA ASSOCIATED WITH TYPE 2 DIABETES MELLITUS (HCC): ICD-10-CM

## 2021-11-08 DIAGNOSIS — N18.31 TYPE 2 DIABETES MELLITUS WITH STAGE 3A CHRONIC KIDNEY DISEASE, WITHOUT LONG-TERM CURRENT USE OF INSULIN (HCC): Primary | ICD-10-CM

## 2021-11-08 DIAGNOSIS — E10.69 MIXED DIABETIC HYPERLIPIDEMIA ASSOCIATED WITH TYPE 1 DIABETES MELLITUS (HCC): ICD-10-CM

## 2021-11-08 DIAGNOSIS — E11.59 HYPERTENSION ASSOCIATED WITH DIABETES (HCC): ICD-10-CM

## 2021-11-08 DIAGNOSIS — E11.22 TYPE 2 DIABETES MELLITUS WITH STAGE 3A CHRONIC KIDNEY DISEASE, WITHOUT LONG-TERM CURRENT USE OF INSULIN (HCC): Primary | ICD-10-CM

## 2021-11-08 DIAGNOSIS — E11.69 MIXED DIABETIC HYPERLIPIDEMIA ASSOCIATED WITH TYPE 2 DIABETES MELLITUS (HCC): ICD-10-CM

## 2021-11-08 DIAGNOSIS — E78.2 MIXED DIABETIC HYPERLIPIDEMIA ASSOCIATED WITH TYPE 1 DIABETES MELLITUS (HCC): ICD-10-CM

## 2021-11-08 DIAGNOSIS — I15.2 HYPERTENSION ASSOCIATED WITH DIABETES (HCC): ICD-10-CM

## 2021-11-08 DIAGNOSIS — N18.32 CHRONIC KIDNEY DISEASE (CKD) STAGE G3B/A3, MODERATELY DECREASED GLOMERULAR FILTRATION RATE (GFR) BETWEEN 30-44 ML/MIN/1.73 SQUARE METER AND ALBUMINURIA CREATININE RATIO GREATER THAN 300 MG/G (C* (HCC): ICD-10-CM

## 2021-11-08 DIAGNOSIS — E55.9 VITAMIN D DEFICIENCY: ICD-10-CM

## 2021-11-08 PROCEDURE — 99214 OFFICE O/P EST MOD 30 MIN: CPT | Performed by: INTERNAL MEDICINE

## 2021-11-08 NOTE — PROGRESS NOTES
" Sophia Neff is a 76 y.o. female who presents for  evaluation of   Diabetes                                    Referring provider    Primary Care Provider    Antonietta Singh MD    Duration 10 years    Timing - Diabetes is Constant    Quality -  Well controlled    Severity -  moderate    Complications - CKD stage III    Current symptoms/problems  none     Alleviating Factors: Compliance       Side Effects  Metformin     Januvia too weak     Current diet  in general, a \"healthy\" diet      Current exercise walking    Current monitoring regimen: home blood tests - checking 1 x daily   Home blood sugar records:     Hypoglycemia if skipped meals     Past Medical History:   Diagnosis Date   • Allergic    • Anemia    • Arthritis    • Bell's palsy    • Chronic kidney disease    • Controlled type 2 diabetes mellitus without complication, without long-term current use of insulin (HCC) 2019   • Diabetes mellitus (HCC)    • Fibromyalgia    • High cholesterol    • Hypertension    • 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    • Plantar fasciitis     Bilateral     Family History   Problem Relation Age of Onset   • Diabetes Sister    • Hyperlipidemia Sister    • Arthritis Sister    • Diabetes Brother    • Cancer Brother    • Cancer Paternal Aunt    • Depression Paternal Grandfather      Social History     Tobacco Use   • Smoking status: Former Smoker     Quit date:      Years since quittin.8   • Smokeless tobacco: Never Used   Substance Use Topics   • Alcohol use: Yes     Comment: 6x per year   • Drug use: Not Currently         Current Outpatient Medications:   •  acetaminophen (TYLENOL) 325 MG tablet, Tylenol 325 mg tablet  Take 2 by mouth, Disp: , Rfl:   •  B-D ULTRA-FINE 33 LANCETS misc, Use 4 x daily , freestyle lite, E11.65, Disp: 120 each, Rfl: 11  •  calcium carbonate (OS-JESICA) 600 MG tablet, Take 600 mg by mouth 2 (Two) Times a Day With Meals., Disp: , Rfl:   •  " "diclofenac (VOLTAREN) 1 % gel gel, Apply 4 g topically to the appropriate area as directed 2 (Two) Times a Day., Disp: , Rfl:   •  Dulaglutide (Trulicity) 4.5 MG/0.5ML solution pen-injector, Inject 4.5 mg under the skin into the appropriate area as directed 1 (One) Time Per Week., Disp: 4 pen, Rfl: 11  •  empagliflozin (Jardiance) 10 MG tablet tablet, Take 1 tablet by mouth Daily., Disp: 90 tablet, Rfl: 3  •  fluocinonide (LIDEX) 0.05 % cream, Apply  topically to the appropriate area as directed 2 (Two) Times a Day., Disp: , Rfl:   •  glucose blood test strip, Freestyle lite , 4 x daily , E11.65, Disp: 120 each, Rfl: 11  •  hydrALAZINE (APRESOLINE) 25 MG tablet, Take 25 mg by mouth 3 (Three) Times a Day., Disp: , Rfl:   •  loratadine (CLARITIN) 10 MG tablet, loratadine 10 mg tablet  Take 1 by mouth, Disp: , Rfl:   •  losartan (COZAAR) 100 MG tablet, Take 100 mg by mouth Daily., Disp: , Rfl:   •  Multiple Vitamins-Minerals (ONE-A-DAY WOMENS 50+ ADVANTAGE PO), One-A-Day Womens Formula 18 mg iron-400 mcg-500 mg tablet  Take 1 by mouth once a day, Disp: , Rfl:   •  pravastatin (PRAVACHOL) 40 MG tablet, pravastatin 40 mg tablet, Disp: , Rfl:   •  RESTASIS 0.05 % ophthalmic emulsion, , Disp: , Rfl:     Review of Systems    Review of Systems     Objective:   /80   Pulse 80   Ht 157.5 cm (62\")   Wt 74.8 kg (165 lb)   SpO2 96%   BMI 30.18 kg/m²     Physical Exam   HENT:   Head: Normocephalic.   Abdominal: Normal appearance.   Musculoskeletal:      Right lower leg: No edema.      Left lower leg: No edema.   Neurological: She is alert.       Lab Review    Results for orders placed or performed in visit on 11/16/20   POC Glycosylated Hemoglobin (Hb A1C)    Specimen: Blood   Result Value Ref Range    Hemoglobin A1C 6.4 %         Assessment/Plan       ICD-10-CM ICD-9-CM   1. Type 2 diabetes mellitus with stage 3a chronic kidney disease, without long-term current use of insulin ([facility])  E11.22 250.40    N18.31 585.3   2. " Mixed diabetic hyperlipidemia associated with type 1 diabetes mellitus (HCC)  E10.69 250.81    E78.2 272.2   3. Hypertension associated with diabetes (HCC)  E11.59 250.80    I15.2 401.9   4. Vitamin D deficiency  E55.9 268.9   5. CKD (chronic kidney disease), stage II  N18.2 585.2   6. Mixed diabetic hyperlipidemia associated with type 2 diabetes mellitus (HCC)  E11.69 250.80    E78.2 272.2         I reviewed and summarized records from Antonietta Singh MD from current year  and I reviewed / ordered labs.   From review of records :    Pt has type 2 diabetes and was having hypoglycemia   Glycemic Management:   Lab Results   Component Value Date    HGBA1C 6.36 (H) 03/12/2021    HGBA1C 6.4 11/16/2020    HGBA1C 5.9 10/21/2019     Lab Results   Component Value Date    GLUCOSE 101 (H) 03/12/2021    BUN 20 03/12/2021    CREATININE 1.04 (H) 03/12/2021    EGFRIFNONA 52 (L) 03/12/2021    BCR 19.2 03/12/2021    K 4.2 03/12/2021    CO2 25.7 03/12/2021    CALCIUM 9.3 03/12/2021    ALBUMIN 4.20 03/12/2021    AST 25 03/12/2021    ALT 18 03/12/2021    ANIONGAP 10.3 03/12/2021     Lab Results   Component Value Date    WBC 5.62 03/12/2021    HGB 12.2 03/12/2021    HCT 36.4 03/12/2021    MCV 86.7 03/12/2021     03/12/2021       trulicity 4.5 and jardiance 10     Lipid Management  Lab Results   Component Value Date    CHOL 147 03/12/2021     Lab Results   Component Value Date    TRIG 131 03/12/2021     Lab Results   Component Value Date    HDL 47 03/12/2021     No components found for: LDLCALC  Lab Results   Component Value Date    LDL 77 03/12/2021     No results found for: LDLDIRECT    Pravastatin 40 mg qhs     Blood Pressure Management:    Vitals:    11/08/21 1529   BP: 130/80   Pulse: 80   SpO2: 96%     Lab Results   Component Value Date    GLUCOSE 101 (H) 03/12/2021    CALCIUM 9.3 03/12/2021     03/12/2021    K 4.2 03/12/2021    CO2 25.7 03/12/2021     03/12/2021    BUN 20 03/12/2021    CREATININE  1.04 (H) 03/12/2021    EGFRIFNONA 52 (L) 03/12/2021    BCR 19.2 03/12/2021    ANIONGAP 10.3 03/12/2021           Nl bp on hydralazine and losartan    On chlorthalidone, stopped before by me when I added jardiance        Microvascular Complication Monitoring:      Eye Exam Evaluation  Within 1 year   -----------    Last Microalbumin-Proteinuria Assessment    Lab Results   Component Value Date    DANIIBCRERATIO 306.9 03/15/2021       No results found for: UTPCR     Stage 3 b / A 3     Add kerendia 10 mg daily     -----------      Neuropathy, none            Weight Related:   Wt Readings from Last 3 Encounters:   11/08/21 74.8 kg (165 lb)   07/15/21 78.5 kg (173 lb)   11/16/20 77.7 kg (171 lb 3.2 oz)     Body mass index is 30.18 kg/m².        Diet interventions: moderate (500 kCal/d) deficit diet.      Bone Health    Lab Results   Component Value Date    CALCIUM 9.3 03/12/2021    [id number] 51.8 03/12/2021       Thyroid Health    Lab Results   Component Value Date    TSH 1.490 03/12/2021            Other Diabetes Related Aspects       Lab Results   Component Value Date    [id number] 554 03/12/2021

## 2021-11-15 ENCOUNTER — TELEPHONE (OUTPATIENT)
Dept: ENDOCRINOLOGY | Facility: CLINIC | Age: 76
End: 2021-11-15

## 2021-11-15 NOTE — TELEPHONE ENCOUNTER
Pt left VM that we sent a script to [facility] for willem and they do not carry it so needs something else or call and let her know what to do ? [phone]

## 2021-12-10 ENCOUNTER — LAB (OUTPATIENT)
Dept: LAB | Facility: HOSPITAL | Age: 76
End: 2021-12-10

## 2021-12-10 DIAGNOSIS — E55.9 VITAMIN D DEFICIENCY: ICD-10-CM

## 2021-12-10 DIAGNOSIS — N18.32 CHRONIC KIDNEY DISEASE (CKD) STAGE G3B/A3, MODERATELY DECREASED GLOMERULAR FILTRATION RATE (GFR) BETWEEN 30-44 ML/MIN/1.73 SQUARE METER AND ALBUMINURIA CREATININE RATIO GREATER THAN 300 MG/G (C* (HCC): ICD-10-CM

## 2021-12-10 DIAGNOSIS — E11.59 HYPERTENSION ASSOCIATED WITH DIABETES (HCC): ICD-10-CM

## 2021-12-10 DIAGNOSIS — N18.31 TYPE 2 DIABETES MELLITUS WITH STAGE 3A CHRONIC KIDNEY DISEASE, WITHOUT LONG-TERM CURRENT USE OF INSULIN (HCC): ICD-10-CM

## 2021-12-10 DIAGNOSIS — N18.30 TYPE 2 DIABETES MELLITUS WITH STAGE 3 CHRONIC KIDNEY DISEASE, WITHOUT LONG-TERM CURRENT USE OF INSULIN (HCC): ICD-10-CM

## 2021-12-10 DIAGNOSIS — E11.69 MIXED DIABETIC HYPERLIPIDEMIA ASSOCIATED WITH TYPE 2 DIABETES MELLITUS (HCC): ICD-10-CM

## 2021-12-10 DIAGNOSIS — E11.22 TYPE 2 DIABETES MELLITUS WITH STAGE 3 CHRONIC KIDNEY DISEASE, WITHOUT LONG-TERM CURRENT USE OF INSULIN (HCC): ICD-10-CM

## 2021-12-10 DIAGNOSIS — N18.2 CKD (CHRONIC KIDNEY DISEASE), STAGE II: ICD-10-CM

## 2021-12-10 DIAGNOSIS — E78.2 MIXED DIABETIC HYPERLIPIDEMIA ASSOCIATED WITH TYPE 2 DIABETES MELLITUS (HCC): ICD-10-CM

## 2021-12-10 DIAGNOSIS — E11.22 TYPE 2 DIABETES MELLITUS WITH STAGE 3A CHRONIC KIDNEY DISEASE, WITHOUT LONG-TERM CURRENT USE OF INSULIN (HCC): ICD-10-CM

## 2021-12-10 DIAGNOSIS — I15.2 HYPERTENSION ASSOCIATED WITH DIABETES (HCC): ICD-10-CM

## 2021-12-10 LAB
25(OH)D3 SERPL-MCNC: 54.6 NG/ML
ALBUMIN SERPL-MCNC: 4.1 G/DL (ref 3.5–5.2)
ALBUMIN/GLOB SERPL: 2 G/DL
ALP SERPL-CCNC: 83 U/L (ref 39–117)
ALT SERPL W P-5'-P-CCNC: 18 U/L (ref 1–33)
ANION GAP SERPL CALCULATED.3IONS-SCNC: 6.9 MMOL/L (ref 5–15)
AST SERPL-CCNC: 20 U/L (ref 1–32)
BASOPHILS # BLD AUTO: 0.03 10*3/MM3 (ref 0–0.2)
BASOPHILS NFR BLD AUTO: 0.6 % (ref 0–1.5)
BILIRUB SERPL-MCNC: 0.5 MG/DL (ref 0–1.2)
BUN SERPL-MCNC: 14 MG/DL (ref 8–23)
BUN/CREAT SERPL: 13.6 (ref 7–25)
CALCIUM SPEC-SCNC: 9.6 MG/DL (ref 8.6–10.5)
CHLORIDE SERPL-SCNC: 107 MMOL/L (ref 98–107)
CHOLEST SERPL-MCNC: 130 MG/DL (ref 0–200)
CO2 SERPL-SCNC: 28.1 MMOL/L (ref 22–29)
CREAT SERPL-MCNC: 1.03 MG/DL (ref 0.57–1)
DEPRECATED RDW RBC AUTO: 46.5 FL (ref 37–54)
EOSINOPHIL # BLD AUTO: 0.16 10*3/MM3 (ref 0–0.4)
EOSINOPHIL NFR BLD AUTO: 3.4 % (ref 0.3–6.2)
ERYTHROCYTE [DISTWIDTH] IN BLOOD BY AUTOMATED COUNT: 14.4 % (ref 12.3–15.4)
GFR SERPL CREATININE-BSD FRML MDRD: 52 ML/MIN/1.73
GLOBULIN UR ELPH-MCNC: 2.1 GM/DL
GLUCOSE SERPL-MCNC: 109 MG/DL (ref 65–99)
HBA1C MFR BLD: 5.73 % (ref 4.8–5.6)
HCT VFR BLD AUTO: 35.8 % (ref 34–46.6)
HDLC SERPL-MCNC: 43 MG/DL (ref 40–60)
HGB BLD-MCNC: 12.2 G/DL (ref 12–15.9)
IMM GRANULOCYTES # BLD AUTO: 0 10*3/MM3 (ref 0–0.05)
IMM GRANULOCYTES NFR BLD AUTO: 0 % (ref 0–0.5)
LDLC SERPL CALC-MCNC: 66 MG/DL (ref 0–100)
LDLC/HDLC SERPL: 1.5 {RATIO}
LYMPHOCYTES # BLD AUTO: 1.03 10*3/MM3 (ref 0.7–3.1)
LYMPHOCYTES NFR BLD AUTO: 21.7 % (ref 19.6–45.3)
MCH RBC QN AUTO: 30.4 PG (ref 26.6–33)
MCHC RBC AUTO-ENTMCNC: 34.1 G/DL (ref 31.5–35.7)
MCV RBC AUTO: 89.3 FL (ref 79–97)
MONOCYTES # BLD AUTO: 0.4 10*3/MM3 (ref 0.1–0.9)
MONOCYTES NFR BLD AUTO: 8.4 % (ref 5–12)
NEUTROPHILS NFR BLD AUTO: 3.13 10*3/MM3 (ref 1.7–7)
NEUTROPHILS NFR BLD AUTO: 65.9 % (ref 42.7–76)
NRBC BLD AUTO-RTO: 0 /100 WBC (ref 0–0.2)
PLATELET # BLD AUTO: 144 10*3/MM3 (ref 140–450)
PMV BLD AUTO: 11.2 FL (ref 6–12)
POTASSIUM SERPL-SCNC: 4.1 MMOL/L (ref 3.5–5.2)
PROT SERPL-MCNC: 6.2 G/DL (ref 6–8.5)
RBC # BLD AUTO: 4.01 10*6/MM3 (ref 3.77–5.28)
SODIUM SERPL-SCNC: 142 MMOL/L (ref 136–145)
TRIGL SERPL-MCNC: 113 MG/DL (ref 0–150)
TSH SERPL DL<=0.05 MIU/L-ACNC: 1.4 UIU/ML (ref 0.27–4.2)
VIT B12 BLD-MCNC: 567 PG/ML (ref 211–946)
VLDLC SERPL-MCNC: 21 MG/DL (ref 5–40)
WBC NRBC COR # BLD: 4.75 10*3/MM3 (ref 3.4–10.8)

## 2021-12-10 PROCEDURE — 82306 VITAMIN D 25 HYDROXY: CPT

## 2021-12-10 PROCEDURE — 85025 COMPLETE CBC W/AUTO DIFF WBC: CPT

## 2021-12-10 PROCEDURE — 80053 COMPREHEN METABOLIC PANEL: CPT

## 2021-12-10 PROCEDURE — 80061 LIPID PANEL: CPT

## 2021-12-10 PROCEDURE — 82607 VITAMIN B-12: CPT

## 2021-12-10 PROCEDURE — 83036 HEMOGLOBIN GLYCOSYLATED A1C: CPT

## 2021-12-10 PROCEDURE — 84443 ASSAY THYROID STIM HORMONE: CPT

## 2021-12-12 DIAGNOSIS — E11.22 TYPE 2 DIABETES MELLITUS WITH STAGE 3A CHRONIC KIDNEY DISEASE, WITHOUT LONG-TERM CURRENT USE OF INSULIN (HCC): Primary | ICD-10-CM

## 2021-12-12 DIAGNOSIS — N18.31 TYPE 2 DIABETES MELLITUS WITH STAGE 3A CHRONIC KIDNEY DISEASE, WITHOUT LONG-TERM CURRENT USE OF INSULIN (HCC): Primary | ICD-10-CM

## 2021-12-12 DIAGNOSIS — N18.32 CHRONIC KIDNEY DISEASE (CKD) STAGE G3B/A3, MODERATELY DECREASED GLOMERULAR FILTRATION RATE (GFR) BETWEEN 30-44 ML/MIN/1.73 SQUARE METER AND ALBUMINURIA CREATININE RATIO GREATER THAN 300 MG/G (C* (HCC): ICD-10-CM

## 2021-12-12 NOTE — PROGRESS NOTES
Please let her know that her labs look perfect    Aic is 5.7, cholesterol, vitamins, liver all normal   There is a lower than normal renal function but stable at 52 . Normal is above 90 but as long as it doesn't decline she is doing perfect.   Next time I need a urine sample as well     I sent kerendia 10 mg to [facility] and she left us a message that they don't carry it. Her insurance doesn't allow me to use any other pharmacy .   We will have to wait     Please ask her to repeat labs 1 w before next appt

## 2022-01-04 ENCOUNTER — TELEPHONE (OUTPATIENT)
Dept: ENDOCRINOLOGY | Facility: CLINIC | Age: 77
End: 2022-01-04

## 2022-01-04 NOTE — TELEPHONE ENCOUNTER
Pt called and requested that her Trulicity 4.5 prescription be changed to a 90 day with 3 refills.  Please call her at [phone] with any questions.  Thanks!

## 2022-01-07 DIAGNOSIS — E11.22 TYPE 2 DIABETES MELLITUS WITH STAGE 3A CHRONIC KIDNEY DISEASE, WITHOUT LONG-TERM CURRENT USE OF INSULIN: Primary | ICD-10-CM

## 2022-01-07 DIAGNOSIS — N18.31 TYPE 2 DIABETES MELLITUS WITH STAGE 3A CHRONIC KIDNEY DISEASE, WITHOUT LONG-TERM CURRENT USE OF INSULIN: Primary | ICD-10-CM

## 2022-01-09 RX ORDER — DULAGLUTIDE 4.5 MG/.5ML
4.5 INJECTION, SOLUTION SUBCUTANEOUS WEEKLY
Qty: 12 PEN | Refills: 3 | Status: SHIPPED | OUTPATIENT
Start: 2022-01-09 | End: 2022-02-17 | Stop reason: SDUPTHER

## 2022-02-17 ENCOUNTER — TELEMEDICINE (OUTPATIENT)
Dept: ENDOCRINOLOGY | Facility: CLINIC | Age: 77
End: 2022-02-17

## 2022-02-17 DIAGNOSIS — E11.59 HYPERTENSION ASSOCIATED WITH DIABETES: ICD-10-CM

## 2022-02-17 DIAGNOSIS — E11.22 TYPE 2 DIABETES MELLITUS WITH STAGE 3A CHRONIC KIDNEY DISEASE, WITHOUT LONG-TERM CURRENT USE OF INSULIN: Primary | ICD-10-CM

## 2022-02-17 DIAGNOSIS — I15.2 HYPERTENSION ASSOCIATED WITH DIABETES: ICD-10-CM

## 2022-02-17 DIAGNOSIS — N18.32 CHRONIC KIDNEY DISEASE (CKD) STAGE G3B/A3, MODERATELY DECREASED GLOMERULAR FILTRATION RATE (GFR) BETWEEN 30-44 ML/MIN/1.73 SQUARE METER AND ALBUMINURIA CREATININE RATIO GREATER THAN 300 MG/G (C*: ICD-10-CM

## 2022-02-17 DIAGNOSIS — N18.31 TYPE 2 DIABETES MELLITUS WITH STAGE 3A CHRONIC KIDNEY DISEASE, WITHOUT LONG-TERM CURRENT USE OF INSULIN: Primary | ICD-10-CM

## 2022-02-17 DIAGNOSIS — E78.2 MIXED DIABETIC HYPERLIPIDEMIA ASSOCIATED WITH TYPE 1 DIABETES MELLITUS: ICD-10-CM

## 2022-02-17 DIAGNOSIS — E10.69 MIXED DIABETIC HYPERLIPIDEMIA ASSOCIATED WITH TYPE 1 DIABETES MELLITUS: ICD-10-CM

## 2022-02-17 DIAGNOSIS — E55.9 VITAMIN D DEFICIENCY: ICD-10-CM

## 2022-02-17 PROCEDURE — 99214 OFFICE O/P EST MOD 30 MIN: CPT | Performed by: INTERNAL MEDICINE

## 2022-02-17 RX ORDER — DULAGLUTIDE 4.5 MG/.5ML
4.5 INJECTION, SOLUTION SUBCUTANEOUS WEEKLY
Qty: 12 PEN | Refills: 3 | Status: SHIPPED | OUTPATIENT
Start: 2022-02-17 | End: 2022-05-27 | Stop reason: SDUPTHER

## 2022-02-17 NOTE — PROGRESS NOTES
Sophia Neff is a 76 y.o. female who presents for  evaluation of   Diabetes                                        This was a Telehealth Encounter. Benefits and Disadvantages of a Telehealth Visit were discussed and accepted by patient. .  Patient agreed to receive service through Telehealth visit as patient is being compliant with social distancing recommendations imparted by CDC.     You have chosen to receive care through a telehealth visit.  Do you consent to use a video/audio connection for your medical care today? Yes        HPI     77 yo female with T2DM for more than 10 years complicated by stage 3 CKD  Diabetes well controlled    PE    There were no vitals taken for this visit.  AOx3  No visible goiter  Normal Respiratory Effort   No Edema    Labs    Lab Results   Component Value Date    WBC 4.75 12/10/2021    HGB 12.2 12/10/2021    HCT 35.8 12/10/2021    MCV 89.3 12/10/2021     12/10/2021     Lab Results   Component Value Date    GLUCOSE 109 (H) 12/10/2021    BUN 14 12/10/2021    CREATININE 1.03 (H) 12/10/2021    EGFRIFNONA 52 (L) 12/10/2021    BCR 13.6 12/10/2021     12/10/2021    K 4.1 12/10/2021    CO2 28.1 12/10/2021    CALCIUM 9.6 12/10/2021    ALBUMIN 4.10 12/10/2021    AST 20 12/10/2021    ALT 18 12/10/2021                   Assessment/Plan       ICD-10-CM ICD-9-CM   1. Type 2 diabetes mellitus with stage 3a chronic kidney disease, without long-term current use of insulin ([facility])  E11.22 250.40    N18.31 585.3   2. Mixed diabetic hyperlipidemia associated with type 1 diabetes mellitus ([facility])  E10.69 250.81    E78.2 272.2   3. Hypertension associated with diabetes ([facility])  E11.59 250.80    I15.2 401.9   4. Chronic kidney disease (CKD) stage G3b/A3, moderately decreased glomerular filtration rate (GFR) between 30-44 mL/min/1.73 square meter and albuminuria creatinine ratio greater than 300 mg/g (C* ([facility])  N18.32 585.3   5. Vitamin D deficiency  E55.9 268.9            T2DM    Glycemic Management:   Lab Results   Component Value Date    HGBA1C 5.73 (H) 12/10/2021    HGBA1C 6.36 (H) 03/12/2021    HGBA1C 6.4 11/16/2020         trulicity 4.5 and jardiance 10     Lipid Management    Lab Results   Component Value Date    CHOL 130 12/10/2021    TRIG 113 12/10/2021    HDL 43 12/10/2021    LDL 66 12/10/2021         Pravastatin 40 mg qhs - now taking 20 mg         Blood Pressure Management:    There were no vitals filed for this visit.  Lab Results   Component Value Date    GLUCOSE 109 (H) 12/10/2021    CALCIUM 9.6 12/10/2021     12/10/2021    K 4.1 12/10/2021    CO2 28.1 12/10/2021     12/10/2021    BUN 14 12/10/2021    CREATININE 1.03 (H) 12/10/2021    EGFRIFNONA 52 (L) 12/10/2021    BCR 13.6 12/10/2021    ANIONGAP 6.9 12/10/2021         Nl bp on hydralazine and losartan 100 mg daily     On chlorthalidone, stopped before by me when I added jardiance        Microvascular Complication Monitoring:      Eye Exam Evaluation  Within 1 year   -----------    Last Microalbumin-Proteinuria Assessment    Lab Results   Component Value Date    MALBCRERATIO 306.9 03/15/2021       No results found for: UTPCR     Stage 3 a / A 3     Add kerendia 10 mg daily ,     -----------      Neuropathy, none            Weight Related:   Wt Readings from Last 3 Encounters:   11/08/21 74.8 kg (165 lb)   07/15/21 78.5 kg (173 lb)   11/16/20 77.7 kg (171 lb 3.2 oz)     There is no height or weight on file to calculate BMI.        Diet interventions: moderate (500 kCal/d) deficit diet.      Bone Health    Lab Results   Component Value Date    CALCIUM 9.6 12/10/2021    [id number] 54.6 12/10/2021       Thyroid Health    Lab Results   Component Value Date    TSH 1.400 12/10/2021    TSH 1.490 03/12/2021            Other Diabetes Related Aspects       Lab Results   Component Value Date    [id number] 567 12/10/2021

## 2022-02-18 ENCOUNTER — SPECIALTY PHARMACY (OUTPATIENT)
Dept: ENDOCRINOLOGY | Facility: CLINIC | Age: 77
End: 2022-02-18

## 2022-02-18 RX ORDER — FINERENONE 10 MG/1
10 TABLET, FILM COATED ORAL DAILY
Qty: 30 TABLET | Refills: 11 | Status: SHIPPED | OUTPATIENT
Start: 2022-02-18 | End: 2022-03-18 | Stop reason: SDUPTHER

## 2022-02-18 NOTE — PROGRESS NOTES
Specialty Pharmacy Patient Management Program  Endocrinology Initial Assessment     Sophia Neff is a 76 y.o. female with Type 2 Diabetes seen by an Endocrinology provider and enrolled in the Endocrinology Patient Management program offered by [facility].  An initial outreach was conducted, including assessment of therapy appropriateness and specialty medication education for Kerendia. The patient was introduced to services offered by [facility], including: regular assessments, refill coordination, curbside pick-up or mail order delivery options, prior authorization maintenance, and financial assistance programs as applicable. The patient was also provided with contact information for the pharmacy team.     Insurance Coverage & Financial Support  Kerendia bridge card    Relevant Past Medical History and Comorbidities  Relevant medical history and concomitant health conditions were discussed with the patient. The patient's chart has been reviewed for relevant past medical history and comorbid health conditions and updated as necessary.   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 The patient's chart has been reviewed for  allergy information and updated as necessary.   Valium [diazepam], Adalat [nifedipine], Benadryl [diphenhydramine], Aciphex [rabeprazole sodium], Bextra [valdecoxib], Celebrex [celecoxib], Codeine, Cortisone, Excedrin back & [acetaminophen-aspirin buffered], Feldene [piroxicam], Lodine [etodolac], Maalox [calcium carbonate antacid], Maxidex [dexamethasone], Milk of magnesia [magnesium hydroxide], Mobic [meloxicam], Morphine and related, Motrin [ibuprofen], Naproxen, Periactin [cyproheptadine], Prevacid [lansoprazole], Prilosec [omeprazole], Toradol [ketorolac tromethamine], Vioxx [rofecoxib], and Voltaren [diclofenac sodium]    Current Medication List  This medication list has been reviewed with the patient and evaluated for any interactions or necessary modifications/recommendations, and updated to include all prescription medications, OTC medications, and supplements the patient is currently taking.  This list reflects what is contained in the patient's profile, which has also been marked as reviewed to communicate to other providers it is the most up to date version of the patient's current medication therapy.     Current Outpatient Medications:   •  acetaminophen (TYLENOL) 325 MG tablet, Tylenol 325 mg tablet  Take 2 by mouth, Disp: , Rfl:   •  B-D ULTRA-FINE 33 LANCETS misc, Use 4 x daily , freestyle lite, E11.65, Disp: 120 each, Rfl: 11  •  calcium carbonate (OS-JESICA) 600 MG tablet, Take 600 mg by mouth 2 (Two) Times a Day With Meals., Disp: , Rfl:   •  diclofenac (VOLTAREN) 1 % gel gel, Apply 4 g topically to the appropriate area as directed 2 (Two) Times a Day., Disp: , Rfl:   •  Dulaglutide (Trulicity) 4.5 MG/0.5ML solution pen-injector, Inject 4.5 mg under the skin into the appropriate area as directed 1 (One) Time Per Week., Disp: 12 pen, Rfl: 3  •  empagliflozin (Jardiance) 10 MG tablet tablet, Take 1 tablet by mouth Daily., Disp: 90 tablet, Rfl: 3  •   Finerenone (Kerendia) 10 MG tablet, Take 10 mg by mouth Daily., Disp: 30 tablet, Rfl: 11  •  Finerenone 10 MG tablet, Take 10 mg by mouth Daily., Disp: 30 tablet, Rfl: 11  •  fluocinonide (LIDEX) 0.05 % cream, Apply  topically to the appropriate area as directed 2 (Two) Times a Day., Disp: , Rfl:   •  glucose blood test strip, Freestyle lite , 4 x daily , E11.65, Disp: 120 each, Rfl: 11  •  hydrALAZINE (APRESOLINE) 25 MG tablet, Take 25 mg by mouth 3 (Three) Times a Day., Disp: , Rfl:   •  loratadine (CLARITIN) 10 MG tablet, loratadine 10 mg tablet  Take 1 by mouth, Disp: , Rfl:   •  losartan (COZAAR) 100 MG tablet, Take 100 mg by mouth Daily., Disp: , Rfl:   •  Multiple Vitamins-Minerals (ONE-A-DAY WOMENS 50+ ADVANTAGE PO), One-A-Day Womens Formula 18 mg iron-400 mcg-500 mg tablet  Take 1 by mouth once a day, Disp: , Rfl:   •  pravastatin (PRAVACHOL) 40 MG tablet, pravastatin 40 mg tablet, Disp: , Rfl:   •  RESTASIS 0.05 % ophthalmic emulsion, , Disp: , Rfl:     Drug Interactions  Ace inhibitors or angiotensin receptor blockers taken with Kerendia can increase the risk of hyperkalemia    Recommended Medications Assessment  • ACEi/ARB - Currently Taking    Relevant Laboratory Values  A1C Last 3 Results    HGBA1C Last 3 Results 3/12/21 12/10/21   Hemoglobin A1C 6.36 (A) 5.73 (A)   (A) Abnormal value            Lab Results   Component Value Date    HGBA1C 5.73 (H) 12/10/2021     Lab Results   Component Value Date    GLUCOSE 109 (H) 12/10/2021    CALCIUM 9.6 12/10/2021     12/10/2021    K 4.1 12/10/2021    CO2 28.1 12/10/2021     12/10/2021    BUN 14 12/10/2021    CREATININE 1.03 (H) 12/10/2021    EGFRIFNONA 52 (L) 12/10/2021    BCR 13.6 12/10/2021    ANIONGAP 6.9 12/10/2021     Lab Results   Component Value Date    CHOL 130 12/10/2021    TRIG 113 12/10/2021    HDL 43 12/10/2021    LDL 66 12/10/2021     Microalbumin    Microalbumin 3/15/21   Microalbumin, Urine 15.9             Initial Education Provided  for Specialty Medication  The patient has been provided with the following education and any applicable administration techniques (i.e. self-injection) have been demonstrated for the therapies indicated. All questions and concerns have been addressed prior to the patient receiving the medication, and the patient has verbalized understanding of the education and any materials provided.  Additional patient education shall be provided and documented upon request by the patient, provider or payer.      KERENDIA® (finerenone)  Medication Expectations   Why am I taking this medication? This medication helps reduce the progression of kidney disease, death from cardiovascular disease, and the risk of heart failure hospitalization in patients with chronic kidney disease and type 2 diabetes.    What should I expect while on this medication? You may see a slight increase in your potassium level when you get lab work.    How does the medication work? This medication blocks some sodium from being reabsorbed and some of the mineralocorticoid receptors in your body from being over activated. This is thought to decrease inflammation and fibrosis.    How long will I be on this medication? This is a maintenance medication meaning it will hopefully be beneficial for you going forward. If your potassium level or kidney function change we may need to adjust or stop the medication.     How do I take this medication? Take as directed on your prescription label. This medication may be taken anytime during the day and with or without food.   What are some possible side effects? The most common side effects include high potassium, low blood pressure, and low sodium. Call your doctor if you notice swelling in your face or hands, confusion, weakness, muscle twitching, lightheadedness, fainting, or an uneven heartbeat.    What happens if I miss a dose? If you miss a dose, take it as soon as you remember. If it is close to your next dose, skip  it (do not take 2 doses at once)     Medication Safety   What are things I should warn my doctor immediately about? Tell your doctor if you have ever been diagnosed with adrenal insufficiency. Let your provider know if you take potassium supplements or vitamins that may not be listed on your medication list. Also let your provider know if you take amiodarone or erythromycin. Also tell your doctor if you notice any signs/symptoms of an allergic reaction (rash, hives, difficulty breathing, etc.)    What are things that I should be cautious or aware of? Be cautious of any side effects from this medication or any new medications/supplements you start   What are some medications that can interact with this one? Do not eat grapefruit or drink grapefruit juice while taking this medication. The side effects of this medication may be increased when taken with other medications that increase potassium.       Medication Storage/Handling   How should I handle this medication? Keep this medication out of reach of pets/children in tightly sealed container.   How does this medication need to be stored? Store at room temperature and away from heat, moisture, and direct light.   How should I dispose of this medication? Take to your local police station or health department for proper disposal. Some pharmacies have take-back bins for medication drop-off.      Resources/Support   How can I remind myself to take this medication? You can download reminder apps to help you manage your refills. You may also set an alarm on your phone to remind you. The pharmacy carries pill boxes that you can place next to an area you pass everyday (such as where you place your car keys or where you charge your phone)   Is financial support available?  [employer] can provide co-pay cards if you have commercial insurance or other patient assistance if you qualify. Ask your pharmacist for details.     Which vaccines are recommended for me? Talk to your doctor  about these vaccines: Flu, Coronavirus (COVID-19), Pneumococcal (pneumonia), Tdap, Hepatitis B, Zoster (shingles)        Adherence and Self-Administration  • Barriers to Patient Adherence and/or Self-Administration: None, cost/access may be an issue in the future with  insurance  • Methods for Supporting Patient Adherence and/or Self-Administration: Kerendia bridge/copay card for now    Goals of Therapy  • Patient Goals of Therapy:  Take medication daily  • Clinical Goals or Therapeutic Targets, If Applicable: We hope to see a potassium within normal limits at the 1 month lab check. We would like to see an overall improvement in the amount of protein in the urine    Reassessment Plan & Follow-Up  1. Medication Therapy Changes: Add Kerenda  2. Additional Plans, Therapy Recommendations, or Therapy Problems to Be Addressed: None, we will continue to follow and assist with other medications as needed  3. Pharmacist to perform regular reassessments no more than (6) months from the previous assessment.  4. Welcome information and patient satisfaction survey to be sent by retail team with patient's initial fill.  5. Care Coordinator to set up future refill outreaches, coordinate prescription delivery, and escalate clinical questions to pharmacist.     Attestation  I attest that the initiated specialty medication(s) are appropriate for the patient based on my assessment.  If the prescribed therapy is at any point deemed not appropriate based on the current or future assessments, a consultation will be initiated with the patient's specialty care provider to determine the best course of action. The revised plan of therapy will be documented along with any additional patient education provided.     Cornell Bloom, Robbie, MPH, BCPS    2/18/2022  10:57 CST

## 2022-03-14 ENCOUNTER — LAB (OUTPATIENT)
Dept: LAB | Facility: HOSPITAL | Age: 77
End: 2022-03-14

## 2022-03-14 LAB
ALBUMIN SERPL-MCNC: 4.1 G/DL (ref 3.5–5.2)
ALBUMIN/GLOB SERPL: 1.6 G/DL
ALP SERPL-CCNC: 96 U/L (ref 39–117)
ALT SERPL W P-5'-P-CCNC: 16 U/L (ref 1–33)
ANION GAP SERPL CALCULATED.3IONS-SCNC: 7 MMOL/L (ref 5–15)
AST SERPL-CCNC: 22 U/L (ref 1–32)
BASOPHILS # BLD AUTO: 0.05 10*3/MM3 (ref 0–0.2)
BASOPHILS NFR BLD AUTO: 0.8 % (ref 0–1.5)
BILIRUB SERPL-MCNC: 0.4 MG/DL (ref 0–1.2)
BUN SERPL-MCNC: 28 MG/DL (ref 8–23)
BUN/CREAT SERPL: 24.1 (ref 7–25)
CALCIUM SPEC-SCNC: 9.3 MG/DL (ref 8.6–10.5)
CHLORIDE SERPL-SCNC: 104 MMOL/L (ref 98–107)
CO2 SERPL-SCNC: 28 MMOL/L (ref 22–29)
CREAT SERPL-MCNC: 1.16 MG/DL (ref 0.57–1)
DEPRECATED RDW RBC AUTO: 41 FL (ref 37–54)
EGFRCR SERPLBLD CKD-EPI 2021: 48.7 ML/MIN/1.73
EOSINOPHIL # BLD AUTO: 0.14 10*3/MM3 (ref 0–0.4)
EOSINOPHIL NFR BLD AUTO: 2.3 % (ref 0.3–6.2)
ERYTHROCYTE [DISTWIDTH] IN BLOOD BY AUTOMATED COUNT: 12.3 % (ref 12.3–15.4)
GLOBULIN UR ELPH-MCNC: 2.6 GM/DL
GLUCOSE SERPL-MCNC: 120 MG/DL (ref 65–99)
HBA1C MFR BLD: 5.6 % (ref 4.8–5.6)
HCT VFR BLD AUTO: 38.9 % (ref 34–46.6)
HGB BLD-MCNC: 12.8 G/DL (ref 12–15.9)
IMM GRANULOCYTES # BLD AUTO: 0.02 10*3/MM3 (ref 0–0.05)
IMM GRANULOCYTES NFR BLD AUTO: 0.3 % (ref 0–0.5)
LYMPHOCYTES # BLD AUTO: 1.28 10*3/MM3 (ref 0.7–3.1)
LYMPHOCYTES NFR BLD AUTO: 21 % (ref 19.6–45.3)
MCH RBC QN AUTO: 29.9 PG (ref 26.6–33)
MCHC RBC AUTO-ENTMCNC: 32.9 G/DL (ref 31.5–35.7)
MCV RBC AUTO: 90.9 FL (ref 79–97)
MONOCYTES # BLD AUTO: 0.52 10*3/MM3 (ref 0.1–0.9)
MONOCYTES NFR BLD AUTO: 8.5 % (ref 5–12)
NEUTROPHILS NFR BLD AUTO: 4.09 10*3/MM3 (ref 1.7–7)
NEUTROPHILS NFR BLD AUTO: 67.1 % (ref 42.7–76)
NRBC BLD AUTO-RTO: 0 /100 WBC (ref 0–0.2)
PLATELET # BLD AUTO: 191 10*3/MM3 (ref 140–450)
PMV BLD AUTO: 11.4 FL (ref 6–12)
POTASSIUM SERPL-SCNC: 4.5 MMOL/L (ref 3.5–5.2)
PROT SERPL-MCNC: 6.7 G/DL (ref 6–8.5)
RBC # BLD AUTO: 4.28 10*6/MM3 (ref 3.77–5.28)
SODIUM SERPL-SCNC: 139 MMOL/L (ref 136–145)
WBC NRBC COR # BLD: 6.1 10*3/MM3 (ref 3.4–10.8)

## 2022-03-14 PROCEDURE — 82043 UR ALBUMIN QUANTITATIVE: CPT | Performed by: INTERNAL MEDICINE

## 2022-03-14 PROCEDURE — 36415 COLL VENOUS BLD VENIPUNCTURE: CPT | Performed by: INTERNAL MEDICINE

## 2022-03-14 PROCEDURE — 82570 ASSAY OF URINE CREATININE: CPT | Performed by: INTERNAL MEDICINE

## 2022-03-14 PROCEDURE — 85025 COMPLETE CBC W/AUTO DIFF WBC: CPT | Performed by: INTERNAL MEDICINE

## 2022-03-14 PROCEDURE — 80053 COMPREHEN METABOLIC PANEL: CPT | Performed by: INTERNAL MEDICINE

## 2022-03-14 PROCEDURE — 83036 HEMOGLOBIN GLYCOSYLATED A1C: CPT | Performed by: INTERNAL MEDICINE

## 2022-03-15 LAB
ALBUMIN UR-MCNC: 9.1 MG/DL
CREAT UR-MCNC: 61.3 MG/DL
MICROALBUMIN/CREAT UR: 148.5 MG/G

## 2022-03-17 DIAGNOSIS — I15.2 HYPERTENSION ASSOCIATED WITH DIABETES: ICD-10-CM

## 2022-03-17 DIAGNOSIS — E55.9 VITAMIN D DEFICIENCY: ICD-10-CM

## 2022-03-17 DIAGNOSIS — E11.59 HYPERTENSION ASSOCIATED WITH DIABETES: ICD-10-CM

## 2022-03-17 DIAGNOSIS — E11.22 TYPE 2 DIABETES MELLITUS WITH STAGE 3A CHRONIC KIDNEY DISEASE, WITHOUT LONG-TERM CURRENT USE OF INSULIN: Primary | ICD-10-CM

## 2022-03-17 DIAGNOSIS — E78.2 MIXED DIABETIC HYPERLIPIDEMIA ASSOCIATED WITH TYPE 1 DIABETES MELLITUS: ICD-10-CM

## 2022-03-17 DIAGNOSIS — E10.69 MIXED DIABETIC HYPERLIPIDEMIA ASSOCIATED WITH TYPE 1 DIABETES MELLITUS: ICD-10-CM

## 2022-03-17 DIAGNOSIS — N18.31 TYPE 2 DIABETES MELLITUS WITH STAGE 3A CHRONIC KIDNEY DISEASE, WITHOUT LONG-TERM CURRENT USE OF INSULIN: Primary | ICD-10-CM

## 2022-03-17 DIAGNOSIS — N18.32 CHRONIC KIDNEY DISEASE (CKD) STAGE G3B/A3, MODERATELY DECREASED GLOMERULAR FILTRATION RATE (GFR) BETWEEN 30-44 ML/MIN/1.73 SQUARE METER AND ALBUMINURIA CREATININE RATIO GREATER THAN 300 MG/G (C*: ICD-10-CM

## 2022-03-18 ENCOUNTER — SPECIALTY PHARMACY (OUTPATIENT)
Dept: ENDOCRINOLOGY | Facility: CLINIC | Age: 77
End: 2022-03-18

## 2022-03-18 RX ORDER — FINERENONE 20 MG/1
20 TABLET, FILM COATED ORAL DAILY
Qty: 30 TABLET | Refills: 11 | Status: SHIPPED | OUTPATIENT
Start: 2022-03-18 | End: 2022-07-05 | Stop reason: HOSPADM

## 2022-03-18 RX ORDER — FINERENONE 20 MG/1
20 TABLET, FILM COATED ORAL DAILY
Qty: 30 TABLET | Refills: 11 | Status: SHIPPED | OUTPATIENT
Start: 2022-03-18

## 2022-03-18 NOTE — PROGRESS NOTES
Specialty Pharmacy Refill Coordination Note     Sophia is a 76 y.o. female contacted today regarding refills of  Kerendia specialty medication(s).    Reviewed and verified with patient:         Specialty medication(s) and dose(s) confirmed: yes    Refill Questions    Flowsheet Row Most Recent Value   Changes to allergies? No   Changes to medications? No   New conditions since last clinic visit No   Unplanned office visit, urgent care, ED, or hospital admission in the last 4 weeks  No   How does patient/caregiver feel medication is working? Very good   Financial problems or insurance changes  No   If yes, describe changes in insurance or financial issues. na   How many doses of your specialty medications were missed in the last 4 weeks? 0   Why were doses missed? na   Does this patient require a clinical escalation to a pharmacist? No          Delivery Questions    Flowsheet Row Most Recent Value   Delivery method [employer]   Delivery address correct? Yes   Delivery phone number [phone]   Number of medications in delivery 1   Medication being filled and delivered kerendia   Is there any medication that is due not being filled? No   Supplies needed? No supplies needed   Cooler needed? No   Do any medications need mixed or dated? No   Additional comments voucher   Questions or concerns for the pharmacist? No   Are any medications first time fills? No        Labs reviewed and kerendia is increased per Dr Avila.  20mg tablets were mailed out today.          Follow-up: 1 month(s)     Tom L. Henderson  Specialty Pharmacy Technician

## 2022-04-26 ENCOUNTER — LAB (OUTPATIENT)
Dept: LAB | Facility: HOSPITAL | Age: 77
End: 2022-04-26

## 2022-04-26 LAB
25(OH)D3 SERPL-MCNC: 51.8 NG/ML (ref 30–100)
ALBUMIN SERPL-MCNC: 4 G/DL (ref 3.5–5.2)
ALBUMIN UR-MCNC: 7.8 MG/DL
ALBUMIN/GLOB SERPL: 1.5 G/DL
ALP SERPL-CCNC: 95 U/L (ref 39–117)
ALT SERPL W P-5'-P-CCNC: 17 U/L (ref 1–33)
ANION GAP SERPL CALCULATED.3IONS-SCNC: 13 MMOL/L (ref 5–15)
AST SERPL-CCNC: 23 U/L (ref 1–32)
BASOPHILS # BLD AUTO: 0.07 10*3/MM3 (ref 0–0.2)
BASOPHILS NFR BLD AUTO: 1.2 % (ref 0–1.5)
BILIRUB SERPL-MCNC: 0.6 MG/DL (ref 0–1.2)
BUN SERPL-MCNC: 19 MG/DL (ref 8–23)
BUN/CREAT SERPL: 17.4 (ref 7–25)
CALCIUM SPEC-SCNC: 9.6 MG/DL (ref 8.6–10.5)
CHLORIDE SERPL-SCNC: 103 MMOL/L (ref 98–107)
CHOLEST SERPL-MCNC: 170 MG/DL (ref 0–200)
CO2 SERPL-SCNC: 22 MMOL/L (ref 22–29)
CREAT SERPL-MCNC: 1.09 MG/DL (ref 0.57–1)
CREAT UR-MCNC: 44.7 MG/DL
DEPRECATED RDW RBC AUTO: 39.5 FL (ref 37–54)
EGFRCR SERPLBLD CKD-EPI 2021: 52.4 ML/MIN/1.73
EOSINOPHIL # BLD AUTO: 0.24 10*3/MM3 (ref 0–0.4)
EOSINOPHIL NFR BLD AUTO: 4.1 % (ref 0.3–6.2)
ERYTHROCYTE [DISTWIDTH] IN BLOOD BY AUTOMATED COUNT: 12.5 % (ref 12.3–15.4)
GLOBULIN UR ELPH-MCNC: 2.7 GM/DL
GLUCOSE SERPL-MCNC: 97 MG/DL (ref 65–99)
HBA1C MFR BLD: 5.7 % (ref 4.8–5.6)
HCT VFR BLD AUTO: 36.3 % (ref 34–46.6)
HDLC SERPL-MCNC: 48 MG/DL (ref 40–60)
HGB BLD-MCNC: 12.5 G/DL (ref 12–15.9)
IMM GRANULOCYTES # BLD AUTO: 0.04 10*3/MM3 (ref 0–0.05)
IMM GRANULOCYTES NFR BLD AUTO: 0.7 % (ref 0–0.5)
LDLC SERPL CALC-MCNC: 104 MG/DL (ref 0–100)
LDLC/HDLC SERPL: 2.15 {RATIO}
LYMPHOCYTES # BLD AUTO: 1.43 10*3/MM3 (ref 0.7–3.1)
LYMPHOCYTES NFR BLD AUTO: 24.4 % (ref 19.6–45.3)
MCH RBC QN AUTO: 30 PG (ref 26.6–33)
MCHC RBC AUTO-ENTMCNC: 34.4 G/DL (ref 31.5–35.7)
MCV RBC AUTO: 87.1 FL (ref 79–97)
MICROALBUMIN/CREAT UR: 174.5 MG/G
MONOCYTES # BLD AUTO: 0.44 10*3/MM3 (ref 0.1–0.9)
MONOCYTES NFR BLD AUTO: 7.5 % (ref 5–12)
NEUTROPHILS NFR BLD AUTO: 3.64 10*3/MM3 (ref 1.7–7)
NEUTROPHILS NFR BLD AUTO: 62.1 % (ref 42.7–76)
NRBC BLD AUTO-RTO: 0.3 /100 WBC (ref 0–0.2)
PLATELET # BLD AUTO: 129 10*3/MM3 (ref 140–450)
PMV BLD AUTO: 12.2 FL (ref 6–12)
POTASSIUM SERPL-SCNC: 4.5 MMOL/L (ref 3.5–5.2)
PROT SERPL-MCNC: 6.7 G/DL (ref 6–8.5)
RBC # BLD AUTO: 4.17 10*6/MM3 (ref 3.77–5.28)
RBC MORPH BLD: NORMAL
SMALL PLATELETS BLD QL SMEAR: NORMAL
SODIUM SERPL-SCNC: 138 MMOL/L (ref 136–145)
TRIGL SERPL-MCNC: 95 MG/DL (ref 0–150)
TSH SERPL DL<=0.05 MIU/L-ACNC: 1.88 UIU/ML (ref 0.27–4.2)
VIT B12 BLD-MCNC: 556 PG/ML (ref 211–946)
VLDLC SERPL-MCNC: 18 MG/DL (ref 5–40)
WBC MORPH BLD: NORMAL
WBC NRBC COR # BLD: 5.86 10*3/MM3 (ref 3.4–10.8)

## 2022-04-26 PROCEDURE — 80061 LIPID PANEL: CPT | Performed by: INTERNAL MEDICINE

## 2022-04-26 PROCEDURE — 82607 VITAMIN B-12: CPT | Performed by: INTERNAL MEDICINE

## 2022-04-26 PROCEDURE — 82306 VITAMIN D 25 HYDROXY: CPT | Performed by: INTERNAL MEDICINE

## 2022-04-26 PROCEDURE — 82570 ASSAY OF URINE CREATININE: CPT | Performed by: INTERNAL MEDICINE

## 2022-04-26 PROCEDURE — 84443 ASSAY THYROID STIM HORMONE: CPT | Performed by: INTERNAL MEDICINE

## 2022-04-26 PROCEDURE — 85007 BL SMEAR W/DIFF WBC COUNT: CPT | Performed by: INTERNAL MEDICINE

## 2022-04-26 PROCEDURE — 85025 COMPLETE CBC W/AUTO DIFF WBC: CPT | Performed by: INTERNAL MEDICINE

## 2022-04-26 PROCEDURE — 80053 COMPREHEN METABOLIC PANEL: CPT | Performed by: INTERNAL MEDICINE

## 2022-04-26 PROCEDURE — 83036 HEMOGLOBIN GLYCOSYLATED A1C: CPT | Performed by: INTERNAL MEDICINE

## 2022-04-26 PROCEDURE — 82043 UR ALBUMIN QUANTITATIVE: CPT | Performed by: INTERNAL MEDICINE

## 2022-05-19 ENCOUNTER — TELEPHONE (OUTPATIENT)
Dept: ENDOCRINOLOGY | Facility: CLINIC | Age: 77
End: 2022-05-19

## 2022-05-27 ENCOUNTER — TELEMEDICINE (OUTPATIENT)
Dept: ENDOCRINOLOGY | Facility: CLINIC | Age: 77
End: 2022-05-27

## 2022-05-27 DIAGNOSIS — N18.32 CHRONIC KIDNEY DISEASE (CKD) STAGE G3B/A3, MODERATELY DECREASED GLOMERULAR FILTRATION RATE (GFR) BETWEEN 30-44 ML/MIN/1.73 SQUARE METER AND ALBUMINURIA CREATININE RATIO GREATER THAN 300 MG/G (C*: ICD-10-CM

## 2022-05-27 DIAGNOSIS — E78.2 MIXED DIABETIC HYPERLIPIDEMIA ASSOCIATED WITH TYPE 1 DIABETES MELLITUS: ICD-10-CM

## 2022-05-27 DIAGNOSIS — N18.31 TYPE 2 DIABETES MELLITUS WITH STAGE 3A CHRONIC KIDNEY DISEASE, WITHOUT LONG-TERM CURRENT USE OF INSULIN: Primary | ICD-10-CM

## 2022-05-27 DIAGNOSIS — E10.69 MIXED DIABETIC HYPERLIPIDEMIA ASSOCIATED WITH TYPE 1 DIABETES MELLITUS: ICD-10-CM

## 2022-05-27 DIAGNOSIS — E55.9 VITAMIN D DEFICIENCY: ICD-10-CM

## 2022-05-27 DIAGNOSIS — E11.59 HYPERTENSION ASSOCIATED WITH DIABETES: ICD-10-CM

## 2022-05-27 DIAGNOSIS — E11.22 TYPE 2 DIABETES MELLITUS WITH STAGE 3A CHRONIC KIDNEY DISEASE, WITHOUT LONG-TERM CURRENT USE OF INSULIN: Primary | ICD-10-CM

## 2022-05-27 DIAGNOSIS — I15.2 HYPERTENSION ASSOCIATED WITH DIABETES: ICD-10-CM

## 2022-05-27 PROCEDURE — 99214 OFFICE O/P EST MOD 30 MIN: CPT | Performed by: INTERNAL MEDICINE

## 2022-05-27 RX ORDER — DULAGLUTIDE 4.5 MG/.5ML
4.5 INJECTION, SOLUTION SUBCUTANEOUS WEEKLY
Qty: 12 PEN | Refills: 3 | Status: SHIPPED | OUTPATIENT
Start: 2022-05-27

## 2022-05-27 NOTE — PROGRESS NOTES
Sophia Neff is a 76 y.o. female who presents for  evaluation of   Diabetes                                        This was a Telehealth Encounter. Benefits and Disadvantages of a Telehealth Visit were discussed and accepted by patient. .  Patient agreed to receive service through Telehealth visit as patient is being compliant with social distancing recommendations imparted by CDC.     You have chosen to receive care through a telehealth visit.  Do you consent to use a video/audio connection for your medical care today? Yes        HPI     75 yo female with T2DM for more than 10 years complicated by stage 3 CKD  Diabetes well controlled    PE    There were no vitals taken for this visit.  AOx3  No visible goiter  Normal Respiratory Effort   No Edema    Labs    Lab Results   Component Value Date    WBC 5.86 04/26/2022    HGB 12.5 04/26/2022    HCT 36.3 04/26/2022    MCV 87.1 04/26/2022     (L) 04/26/2022     Lab Results   Component Value Date    GLUCOSE 97 04/26/2022    BUN 19 04/26/2022    CREATININE 1.09 (H) 04/26/2022    EGFRIFNONA 52 (L) 12/10/2021    BCR 17.4 04/26/2022     04/26/2022    K 4.5 04/26/2022    CO2 22.0 04/26/2022    CALCIUM 9.6 04/26/2022    ALBUMIN 4.00 04/26/2022    AST 23 04/26/2022    ALT 17 04/26/2022                   Assessment & Plan       ICD-10-CM ICD-9-CM   1. Type 2 diabetes mellitus with stage 3a chronic kidney disease, without long-term current use of insulin ([facility])  E11.22 250.40    N18.31 585.3   2. Mixed diabetic hyperlipidemia associated with type 1 diabetes mellitus ([facility])  E10.69 250.81    E78.2 272.2   3. Hypertension associated with diabetes ([facility])  E11.59 250.80    I15.2 401.9   4. Chronic kidney disease (CKD) stage G3b/A3, moderately decreased glomerular filtration rate (GFR) between 30-44 mL/min/1.73 square meter and albuminuria creatinine ratio greater than 300 mg/g (C* ([facility])  N18.32 585.3   5. Vitamin D deficiency  E55.9 268.9            T2DM    Glycemic Management:   Lab Results   Component Value Date    HGBA1C 5.70 (H) 04/26/2022    HGBA1C 5.60 03/14/2022    HGBA1C 5.73 (H) 12/10/2021         trulicity 4.5 and jardiance 10     Lipid Management    Lab Results   Component Value Date    CHOL 170 04/26/2022    TRIG 95 04/26/2022    HDL 48 04/26/2022     (H) 04/26/2022         Pravastatin 40 mg qhs - now taking 20 mg         Blood Pressure Management:    There were no vitals filed for this visit.  Lab Results   Component Value Date    GLUCOSE 97 04/26/2022    CALCIUM 9.6 04/26/2022     04/26/2022    K 4.5 04/26/2022    CO2 22.0 04/26/2022     04/26/2022    BUN 19 04/26/2022    CREATININE 1.09 (H) 04/26/2022    EGFRIFNONA 52 (L) 12/10/2021    BCR 17.4 04/26/2022    ANIONGAP 13.0 04/26/2022         Nl bp on hydralazine and losartan 100 mg daily     On chlorthalidone, stopped before by me when I added jardiance        Microvascular Complication Monitoring:      Eye Exam Evaluation  Within 1 year   -----------    Last Microalbumin-Proteinuria Assessment    Lab Results   Component Value Date    MALBCRERATIO 174.5 04/26/2022    MALBCRERATIO 148.5 03/14/2022    MALBCRERATIO 306.9 03/15/2021       No results found for: UTPCR     Stage 3 a / A 3 -- now A2    On kerendia 20 mg daily , jardiance and losartan     -----------      Neuropathy, none            Weight Related:   Wt Readings from Last 3 Encounters:   11/08/21 74.8 kg (165 lb)   07/15/21 78.5 kg (173 lb)   11/16/20 77.7 kg (171 lb 3.2 oz)     There is no height or weight on file to calculate BMI.        Diet interventions: moderate (500 kCal/d) deficit diet.      Bone Health    Lab Results   Component Value Date    CALCIUM 9.6 04/26/2022    [id number] 51.8 04/26/2022       Thyroid Health    Lab Results   Component Value Date    TSH 1.880 04/26/2022    TSH 1.400 12/10/2021    TSH 1.490 03/12/2021            Other Diabetes Related Aspects       Lab Results   Component Value Date     [id number] 556 04/26/2022

## 2022-07-05 ENCOUNTER — SPECIALTY PHARMACY (OUTPATIENT)
Dept: ENDOCRINOLOGY | Facility: CLINIC | Age: 77
End: 2022-07-05

## 2022-08-10 ENCOUNTER — TRANSCRIBE ORDERS (OUTPATIENT)
Dept: PHYSICAL THERAPY | Facility: HOSPITAL | Age: 77
End: 2022-08-10

## 2022-08-10 DIAGNOSIS — M19.012 PRIMARY OSTEOARTHRITIS OF LEFT SHOULDER: Primary | ICD-10-CM

## 2022-08-22 ENCOUNTER — HOSPITAL ENCOUNTER (OUTPATIENT)
Dept: PHYSICAL THERAPY | Facility: HOSPITAL | Age: 77
Setting detail: THERAPIES SERIES
Discharge: HOME | End: 2022-08-22

## 2022-08-22 DIAGNOSIS — M19.012 PRIMARY OSTEOARTHRITIS OF LEFT SHOULDER: Primary | ICD-10-CM

## 2022-08-22 PROCEDURE — 97161 PT EVAL LOW COMPLEX 20 MIN: CPT

## 2022-08-22 NOTE — THERAPY EVALUATION
Outpatient Physical Therapy Ortho Initial Evaluation  [facility]     Patient Name: Sophia Neff  : 1945  MRN: [id number]  Today's Date: 2022      Visit Date: 2022   % IMPROVEMENT: n/a  NUMBER OF VISITS:   RECERT DATE: 2022  RETURN TO PHYSICIAN: n/a  VISIT DX: Shoulder Impingement 2/2 primary OA of L shoulder     Allergies   Allergen Reactions   • Valium [Diazepam] Mental Status Change     Left her unable to respond   • Adalat [Nifedipine] Swelling and Other (See Comments)     Cramps, fatigue   • Benadryl [Diphenhydramine] Itching   • Aciphex [Rabeprazole Sodium] Diarrhea   • Bextra [Valdecoxib] Other (See Comments)     Recommended not to take per Nephrology.   • Celebrex [Celecoxib] Other (See Comments)     Recommended not to take per Nephrology.   • Codeine Other (See Comments)     Recommended not to take per Nephrology.   • Cortisone Other (See Comments)     Recommended not to take per Nephrology.   • Excedrin Back & [Acetaminophen-Aspirin Buffered] Other (See Comments)     Recommended not to take per Nephrology.   • Feldene [Piroxicam] Other (See Comments)     Recommended not to take per Nephrology.   • Lodine [Etodolac] Other (See Comments)     Recommended not to take per Nephrology.     • Maalox [Calcium Carbonate Antacid] Other (See Comments)     Recommended not to take per Nephrology.     • Maxidex [Dexamethasone] Other (See Comments)     Recommended not to take per Nephrology.   • Milk Of Magnesia [Magnesium Hydroxide] Other (See Comments)     Recommended not to take per Nephrology.     • Mobic [Meloxicam] Other (See Comments)     Recommended not to take per Nephrology.     • Morphine And Related Other (See Comments)     Recommended not to take per Nephrology.   • Motrin [Ibuprofen] Other (See Comments)     Recommended not to take per Nephrology.     • Naproxen Other (See Comments)     Recommended not to take per Nephrology.     • Periactin [Cyproheptadine]  Other (See Comments)     Recommended not to take per Nephrology.   • Prevacid [Lansoprazole] Diarrhea   • Prilosec [Omeprazole] Diarrhea   • Toradol [Ketorolac Tromethamine] Other (See Comments)     Recommended not to take per Nephrology.   • Vioxx [Rofecoxib] Other (See Comments)     Recommended not to take per Nephrology.   • Voltaren [Diclofenac Sodium] Other (See Comments)     Recommended not to take per Nephrology.       Patient Active Problem List   Diagnosis   • Cystocele and rectocele with incomplete uterovaginal prolapse   • Essential hypertension   • Stage 2 chronic kidney disease        Past Medical History:   Diagnosis Date   • Allergic    • Anemia    • Arthritis    • Bell's palsy    • Chronic kidney disease    • Controlled type 2 diabetes mellitus without complication, without long-term current use of insulin ([facility]) 08/12/2019   • Diabetes mellitus ([facility])    • Fibromyalgia    • High cholesterol    • Hypertension    • Iron deficiency anemia    • 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 Primary osteoarthritis of left shoulder  M19.012 715.11          Patient History     Row Name 08/22/22 0800             History    Chief Complaint Pain;Joint stiffness  -AC      Type of Pain Shoulder pain  -AC      Date Current Problem(s) Began --  about a year ago  -AC      Brief Description of Current Complaint OA that started last year. Pt reports fibromyalgia. Pt states she does not have much muscle in her shoulder. Unsure what she think is causing the pain. Last 3-4 months pain in neck that radiates down L arm into L LE. Pt has difficulty  reaching behind her back. Feel like it started after she lost weight.  -AC      Previous treatment for THIS PROBLEM Rehabilitation  -AC      Patient/Caregiver Goals Relieve pain;Know what to do to help the symptoms  -AC      Current Tobacco Use None  -AC      Smoking Status Former Smoker, quite years ago.  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 No, family MD  -AC      What clinical tests have you had for this problem? X-ray  -AC      Results of Clinical Tests see chart review  -AC      Related/Recent Hospitalizations No  -AC      Are you or can you be pregnant No  -AC              Pain     Pain Location Shoulder  -AC      Pain at Present 0  only pain when she moves her am  -AC      Pain at Best 0  -AC      Pain at Worst 10  when she moves her arm  -AC      Pain Frequency Intermittent  -AC      Pain Description Sharp  -AC      What Performance Factors Make the Current Problem(s) WORSE? reaching behind back, moving shoulder up abover her head, reaching out to side  -AC      What Performance Factors Make the Current Problem(s) BETTER? Not moving her shoulder, heat  -AC      Is your sleep disturbed? No  -AC      What position do you sleep in? Right sidelying  -AC      Difficulties with ADL's? No  -AC              Fall Risk Assessment    Any falls in the past year: No  -AC            User Key  (r) = Recorded By, (t) = Taken By, (c) = Cosigned By    Initials Name Provider Type     Brooke Sheridan PT Physical Therapist                 PT Ortho     Row Name 08/22/22 1000       Shoulder Girdle Accessory Motions    Posterior glide of humerus Left:;Hypomobile  -AC    Inferior glide of humerus Left:;Hypomobile  -AC       Shoulder Impingement/Rotator Cuff Special Tests     Neer Impingement Test (RC Lesion vs. Bursitis) Left:;Positive  -AC       Shoulder Girdle Palpation    Subacromial Space Left:;Tender  -AC    Supraspinatus Insertion Left:;Tender  -AC    AC Joint Left:;Tender  -AC    Greater Tubercule Left:;Tender  -AC    Upper Trap Left:;Tender;Guarded/taut  -AC    Levator Scapula Left:;Tender;Guarded/taut  -AC       Right Upper Ext    Rt Shoulder Abduction AROM 130 °  -AC    Rt Shoulder Flexion AROM 130°  -AC    Rt Shoulder Internal Rotation AROM IR to T10-T11 level  -AC       Left Upper Ext    Lt Shoulder Abduction AROM 105°  -AC    Lt Shoulder Abduction PROM 140°  -AC    Lt Shoulder Flexion AROM 120°  -AC    Lt Shoulder Flexion PROM 150°  -AC    Lt Shoulder Internal Rotation AROM IR to T12-L1 level  -AC       MMT (Manual Muscle Testing)    General MMT Comments R UE 4/5 except shoulder ABD 3/5; L UE 3/5  no P! noted just soreness  -AC       Sensation    Sensation WNL? WFL  -AC       Transfers    Comment, (Transfers) I with all transfers except sup to sit required min A from PT.  -AC       Gait/Stairs (Locomotion)    Comment, (Gait/Stairs) I with ambulation into clinic. Pt demo increased fwd flexion and decreased gab/step length. No AD required.  -AC    Row Name 08/22/22 0800       Subjective Comments    Subjective Comments see subjective history  -AC       Precautions and Contraindications    Precautions/Limitations no known precautions/limitations  -AC       Subjective Pain    Able to rate subjective pain? yes  -AC    Pre-Treatment Pain Level 0  -AC    Post-Treatment Pain Level 0  -AC       Posture/Observations    Posture/Observations Comments Pt in no apparent distress. Pt demo increased forward trunk flexion and increased forward head posture. See assessment comments.  -AC       Neural Tension Signs- Upper Quarter Clearing    ULNTT 1 Negative  -AC    ULNTT 2 Negative  -AC    ULNTT 3 Negative  -AC       Sensory Screen for Light Touch- Upper Quarter Clearing    C4 (posterior  shoulder) Bilateral:;Intact  -AC    C5 (lateral upper arm) Bilateral:;Intact  -AC    C6 (tip of thumb) Bilateral:;Intact  -AC    C7 (tip of 3rd finger) Bilateral:;Intact  -AC    C8 (tip of 5th finger) Bilateral:;Intact  -AC    T1 (medial lower arm) Bilateral:;Intact  -AC       Myotomal Screen- Upper Quarter Clearing     WNL  -AC       Cervical/Shoulder ROM Screen    Cervical flexion Normal  -AC    Cervical extension Impaired  -AC    Cervical lateral flexion Impaired  -AC    Cervical rotation Impaired  R rot 40 degrees, L rot 30 degrees (P!)  -          User Key  (r) = Recorded By, (t) = Taken By, (c) = Cosigned By    Initials Name Provider Type    AC Brooke Sheridan, PT Physical Therapist                            Therapy Education  Given: HEP, Symptoms/condition management, Pain management, Posture/body mechanics  Program: New  How Provided: Verbal, Demonstration, Written  Provided to: Patient  Level of Understanding: Teach back education performed, Verbalized, Demonstrated      PT OP Goals     Row Name 08/22/22 1300          PT Short Term Goals    STG Date to Achieve 09/12/22  -     STG 1 Patient I with HEP and have additions/changes by next recertification.  -     STG 2 Pt to increase L shoulder flexion AROM to >/= 145 degrees.  -     STG 3 Pt to increase L shoulder abd AROM to >/= 130 degrees.  -     STG 4 Pt to improve MMT of B UE to 4/5 to indicate improved strength.  -     STG 5 Pt to demonstrate proper postural modifications with no cues from PT.  -            Long Term Goals    LTG 1 Pt to report >/= 75% improvement since initial eval indicating improved function.  -     LTG 2 Pt to improve B UE to 5/5.  -     LTG 3 Pt to report no pain or tenderness with palpation of L UT.  -     LTG 4 Pt to improve all cervical AROM to WFL with no pain reported.  -     LTG 5 Pt to improve B shoulder AROM to WFL.  -     LTG 6 Pt to report no difficulty with reaching behind her back indicating  improvement of symptoms.  -AC     LTG 7 Pt to be I with final HEP.  -AC     LTG 8 Pt will improve Quick DASH score to </= 10 indicating improvement in function.  -AC            Time Calculation    PT Goal Re-Cert Due Date 09/12/22  -           User Key  (r) = Recorded By, (t) = Taken By, (c) = Cosigned By    Initials Name Provider Type     Brooke Sheridan, PT Physical Therapist                 PT Assessment/Plan     Row Name 08/22/22 1000          PT Assessment    Functional Limitations Limitation in home management;Limitations in functional capacity and performance;Performance in leisure activities;Limitations in community activities  -     Impairments Impaired muscle length;Impaired muscle endurance;Impaired muscle power;Impaired flexibility;Joint integrity;Joint mobility;Muscle strength;Pain;Posture;Range of motion  -     Assessment Comments Sophia is a 77 y.o. female who presents to PT with L shoulder pain 2/2 primary OA of L shoulder. Pt demo decreased L shoulder AROM, decreased B UE strength, and impaired posture. Pt also demo difficulty with reaching behind her back and associated pain with motion. Pt ROM improved passively and able to obtain >10°. Pt likely has subacromial impingement 2/2 to primary OA of L shoulder and impaired posture. PT also has limited cervical AROM. Pt would benefit from skilled PT to address the deficits listed and improve quality of B UE function. A small HEP was established this date due to pt insurance requiring authorization.  -AC     Please refer to paper survey for additional self-reported information No  -AC     Rehab Potential Fair  -AC     Patient/caregiver participated in establishment of treatment plan and goals Yes  -AC     Patient would benefit from skilled therapy intervention Yes  -AC            PT Plan    PT Frequency 2x/week  -AC     Predicted Duration of Therapy Intervention (PT) 8-10 visits  -AC     Planned CPT's? PT EVAL LOW COMPLEXITY: 46072;PT RE-EVAL:  42975;PT THER PROC EA 15 MIN: 68941;PT THER ACT EA 15 MIN: 54339;PT MANUAL THERAPY EA 15 MIN: 36551;PT NEUROMUSC RE-EDUCATION EA 15 MIN: 17574;PT SELF CARE/HOME MGMT/TRAIN EA 15: 73124  -     PT Plan Comments Incorporate overall AROM, strength, flexibility, and postural correction exercises. Implement L shoulder mobilizations to improve shoulder IR, flexion, and ABD. STM to L UT. Cervical AROM exercises. Assess  strength next visit.  -           User Key  (r) = Recorded By, (t) = Taken By, (c) = Cosigned By    Initials Name Provider Type     Brooke Sheridan PT Physical Therapist                   OP Exercises     Row Name 08/22/22 0900 08/22/22 0800          Subjective Comments    Subjective Comments -- see subjective history  -            Subjective Pain    Able to rate subjective pain? -- yes  -AC     Pre-Treatment Pain Level -- 0  -AC     Post-Treatment Pain Level -- 0  -AC            Exercise 1    Exercise Name 1 Scapular Retractions  -AC --     Reps 1 10 reps  -AC --     Time 1 5 sec  -AC --            Exercise 2    Exercise Name 2 Doorway stretch  -AC --     Sets 2 1  -AC --     Time 2 30 sec  -AC --     Additional Comments each side  -AC --            Exercise 3    Exercise Name 3 UT S  -AC --     Time 3 30 sec  -AC --     Additional Comments each side  -AC --           User Key  (r) = Recorded By, (t) = Taken By, (c) = Cosigned By    Initials Name Provider Type     Brooke Sheridan PT Physical Therapist                              Outcome Measure Options: Quick DASH  Quick DASH  Open a tight or new jar.: Mild Difficulty  Do heavy household chores (e.g., wash walls, wash floors): No Difficulty  Carry a shopping bag or briefcase: No Difficulty  Wash your back: No Difficulty  Use a knife to cut food: No Difficulty  Recreational activities in which you take some force or impact through your arm, should or hand (e.g. golf, hammering, tennis, etc.): No Difficulty  During the past week, to what  extent has your arm, shoulder, or hand problem interfered with your normal social activites with family, friends, neighbors or groups?: Moderately  During the past week, were you limited in your work or other regular daily activities as a result of your arm, shoulder or hand problem?: Moderately Limited  Arm, Shoulder, or hand pain: Moderate  Tingling (pins and needles) in your arm, shoulder, or hand: None  During the past week, how much difficulty have you had sleeping because of the pain in your arm, shoulder or hand?: Mild Difficulty  Number of Questions Answered: 11  Quick DASH Score: 18.18         Time Calculation:     Start Time: 0829  Stop Time: 0915  Time Calculation (min): 46 min     Therapy Charges for Today     Code Description Service Date Service Provider Modifiers Qty    47761936669 HC PT EVAL LOW COMPLEXITY 3 8/22/2022 Brooke Sheridan, PT GP 1          PT G-Codes  Outcome Measure Options: Quick DASH  Quick DASH Score: 18.18         Brooke Sheridan PT , DPT 8/22/2022

## 2022-08-24 ENCOUNTER — HOSPITAL ENCOUNTER (OUTPATIENT)
Dept: PHYSICAL THERAPY | Facility: HOSPITAL | Age: 77
Setting detail: THERAPIES SERIES
Discharge: HOME | End: 2022-08-24

## 2022-08-24 DIAGNOSIS — M19.012 PRIMARY OSTEOARTHRITIS OF LEFT SHOULDER: Primary | ICD-10-CM

## 2022-08-24 PROCEDURE — 97110 THERAPEUTIC EXERCISES: CPT

## 2022-08-24 NOTE — THERAPY TREATMENT NOTE
Outpatient Physical Therapy Ortho Treatment Note  [facility]     Patient Name: Sophia Neff  : 1945  MRN: [id number]  Today's Date: 2022     Pt seen for 2 PT sessions  Reported Improvement:  N/A %  MD Visit: N/A  Recheck Date: 2022    Therapy Diagnosis:  Shoulder Impingement 2/2 primary OA of L shoulder         Visit Date: 2022    Visit Dx:    ICD-10-CM ICD-9-CM   1. Primary osteoarthritis of left shoulder  M19.012 715.11       Patient Active Problem List   Diagnosis   • Cystocele and rectocele with incomplete uterovaginal prolapse   • Essential hypertension   • Stage 2 chronic kidney disease        Past Medical History:   Diagnosis Date   • Allergic    • Anemia    • Arthritis    • Bell's palsy    • Chronic kidney disease    • Controlled type 2 diabetes mellitus without complication, without long-term current use of insulin ([facility]) 2019   • Diabetes mellitus ([facility])    • Fibromyalgia    • High cholesterol    • Hypertension    • Iron deficiency anemia    • 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  -       Subjective Pain    Able to rate subjective pain? yes  -    Pre-Treatment Pain Level 0  -        Strength Right    # Reps 3  -    Right Rung 2  -    Right  Test 1 28  -    Right  Test 2 35  -    Right  Test 3 30  -     Strength Average Right 31  -        Strength Left    # Reps 3  -    Left Rung 2  -    Left  Test 1 14  -    Left   "Test 2 20  -JW    Left  Test 3 22  -JW     Strength Average Left 18.67  -          User Key  (r) = Recorded By, (t) = Taken By, (c) = Cosigned By    Initials Name Provider Type    Shital Cadet PTA Physical Therapist Assistant                             PT Assessment/Plan     Row Name 08/24/22 1000          PT Assessment    Assessment Comments Pt with fair tolerance and effort with therapy tx this date.  Reports a little bit of pain in L shoulder at end of ABD on pulleys.  appears to fall asleep during tx session with stretches. States she worked on projects all thru the night.No reports of pain at end of tx session  -            PT Plan    PT Frequency 2x/week  -JW     PT Plan Comments Add wand AAROM ativity, and low tband rows  -           User Key  (r) = Recorded By, (t) = Taken By, (c) = Cosigned By    Initials Name Provider Type    Shital Cadet PTA Physical Therapist Assistant                   OP Exercises     Row Name 08/24/22 1000             Subjective Comments    Subjective Comments Pt states the shoulder is feeling pretty good right now.  -JW              Subjective Pain    Able to rate subjective pain? yes  -JW      Pre-Treatment Pain Level 0  -JW      Post-Treatment Pain Level 0  -JW              Exercise 1    Exercise Name 1 Pro II UE bike F/R for posture hood, ROM and endurance  -JW      Time 1 10 min  -JW      Additional Comments L 4.0  -JW              Exercise 2    Exercise Name 2 Pulley's 3 way  -JW      Reps 2 --  -JW      Time 2 3 min ea direction  -JW              Exercise 3    Exercise Name 3 seated B UT st  -JW      Reps 3 2  -JW      Time 3 30  -JW              Exercise 4    Exercise Name 4 Seated LS st  -JW      Reps 4 2  -JW      Time 4 30  -JW      Additional Comments no overpressure  -JW              Exercise 5    Exercise Name 5 Seated scap squeezes  -JW      Sets 5 2  -JW      Reps 5 10  -JW      Time 5 5\" hold  -JW              Exercise 6    Exercise Name 6 "  strength assessment  -JW              Exercise 7    Exercise Name 7 Cspine 3way AROM  -JW      Reps 7 10 ea direction  -JW              Exercise 8    Exercise Name 8 Post Shoulder rolls  -JW      Reps 8 2  -JW      Time 8 10  -JW            User Key  (r) = Recorded By, (t) = Taken By, (c) = Cosigned By    Initials Name Provider Type    Shital Cadet PTA Physical Therapist Assistant                              PT OP Goals     Row Name 08/24/22 1000          Time Calculation    PT Goal Re-Cert Due Date 09/12/22  -           User Key  (r) = Recorded By, (t) = Taken By, (c) = Cosigned By    Initials Name Provider Type    HARVINDER Shital Booker PTA Physical Therapist Assistant                Therapy Education  Education Details: Cspine AROM, LS st, UT st  Given: HEP, Posture/body mechanics  Program: New, Reinforced  How Provided: Verbal, Demonstration, Written  Provided to: Patient  Level of Understanding: Verbalized, Demonstrated              Time Calculation:   Start Time: 1026  Stop Time: 1115  Time Calculation (min): 49 min  Therapy Charges for Today     Code Description Service Date Service Provider Modifiers Qty    27919659230 HC PT THER PROC EA 15 MIN 8/24/2022 Shitla oBoker PTA GP, CQ 3    [id number] HC PT THER SUPP EA 15 MIN 8/24/2022 Shital Booker PTA GP, CQ 1                    Shital Booker PTA  8/24/2022

## 2022-08-29 ENCOUNTER — HOSPITAL ENCOUNTER (OUTPATIENT)
Dept: PHYSICAL THERAPY | Facility: HOSPITAL | Age: 77
Setting detail: THERAPIES SERIES
Discharge: HOME | End: 2022-08-29

## 2022-08-29 DIAGNOSIS — M19.012 PRIMARY OSTEOARTHRITIS OF LEFT SHOULDER: Primary | ICD-10-CM

## 2022-08-29 PROCEDURE — 97110 THERAPEUTIC EXERCISES: CPT

## 2022-08-29 NOTE — THERAPY TREATMENT NOTE
"    Outpatient Physical Therapy Ortho Treatment Note  [facility]     Patient Name: Sophia Neff  : 1945  MRN: [id number]  Today's Date: 2022      Visit Date: 2022   Pt seen for 3 PT sessions  Reported Improvement:  N/A %  MD Visit: N/A  Recheck Date: 2022     Therapy Diagnosis:  Shoulder Impingement 2/2 primary OA of L shoulder     Visit Dx:    ICD-10-CM ICD-9-CM   1. Primary osteoarthritis of left shoulder  M19.012 715.11       Patient Active Problem List   Diagnosis   • Cystocele and rectocele with incomplete uterovaginal prolapse   • Essential hypertension   • Stage 2 chronic kidney disease        Past Medical History:   Diagnosis Date   • Allergic    • Anemia    • Arthritis    • Bell's palsy    • Chronic kidney disease    • Controlled type 2 diabetes mellitus without complication, without long-term current use of insulin ([facility]) 2019   • Diabetes mellitus ([facility])    • Fibromyalgia    • High cholesterol    • Hypertension    • Iron deficiency anemia    • 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 Reports no pain in shoulder. States \"it feels good today\".  -AC       Subjective Pain    Able to rate subjective pain? yes  -AC    Pre-Treatment Pain Level 0  -AC    Post-Treatment Pain Level 0  -AC          User Key  (r) = Recorded By, (t) = Taken By, (c) = Cosigned By    Initials Name Provider Type    Brooke Carter, PT Physical Therapist                             PT Assessment/Plan     Row Name " "08/29/22 1000          PT Assessment    Assessment Comments Pt required verbal and tactile cues for tband low rows this date. Pt continues to demo poor postural awareness with continuous cues for proper posture. Pt required mod A from PT for sup > sit t/f. Unable to sit up from flat bed by herself. L shoulder soreness noted at end of session.  -AC            PT Plan    PT Frequency 2x/week  -AC     PT Plan Comments Continue shouder AAROM, strength, and scapular stabilization activities. Next visit at supine serratus punch, shoulder shrugs, and IR post capsule stretch.  -AC           User Key  (r) = Recorded By, (t) = Taken By, (c) = Cosigned By    Initials Name Provider Type    AC Brooke Sheridan, PT Physical Therapist                   OP Exercises     Row Name 08/29/22 1000             Subjective Comments    Subjective Comments Pt states she worked a lot over the weekend. Reports no pain in shoulder. States \"it feels good today\".  -AC              Subjective Pain    Able to rate subjective pain? yes  -AC      Pre-Treatment Pain Level 0  -AC      Post-Treatment Pain Level 0  -AC              Exercise 1    Exercise Name 1 Pro II UE bike F/R for posture hood, ROM and endurance  -AC      Time 1 8 mins  -AC      Additional Comments L 4.0  -AC              Exercise 2    Exercise Name 2 Pulley's 3 way  -AC      Time 2 3 min ea direction  -AC      Additional Comments flex, ABD, scaption  -AC              Exercise 3    Exercise Name 3 seated B UT st  -AC      Reps 3 2  -AC      Time 3 30  -AC              Exercise 4    Exercise Name 4 Seated LS st  -AC      Reps 4 2  -AC      Time 4 30  -AC      Additional Comments no overpressure  -AC              Exercise 5    Exercise Name 5 Seated scap squeezes  -AC      Cueing 5 Verbal;Tactile  -AC      Sets 5 2  -AC      Reps 5 10  -AC      Time 5 5\" hold  -AC              Exercise 6    Exercise Name 6 Tband low row  -AC      Cueing 6 Verbal;Tactile  -AC      Reps 6 20  -AC      " Additional Comments YTB  -              Exercise 7    Exercise Name 7 Cspine 3way AROM  -AC      Reps 7 10 ea direction  -              Exercise 8    Exercise Name 8 wanmateo AAROM  -      Cueing 8 Verbal  -AC      Reps 8 1 min each direction  -      Additional Comments flex, ABD, IR  -AC              Exercise 9    Exercise Name 9 Doorway stretch  -AC      Sets 9 2  -AC      Reps 9 --  -AC      Time 9 30 sec each  -AC            User Key  (r) = Recorded By, (t) = Taken By, (c) = Cosigned By    Initials Name Provider Type    AC Brooke Sheridan, PT Physical Therapist                              PT OP Goals     Row Name 08/29/22 1000          PT Short Term Goals    STG Date to Achieve 09/12/22  -     STG 1 Patient I with HEP and have additions/changes by next recertification.  -     STG 1 Progress Progressing  -     STG 2 Pt to increase L shoulder flexion AROM to >/= 145 degrees.  -     STG 3 Pt to increase L shoulder abd AROM to >/= 130 degrees.  -     STG 4 Pt to improve MMT of B UE to 4/5 to indicate improved strength.  -     STG 5 Pt to demonstrate proper postural modifications with no cues from PT.  -     STG 5 Progress Ongoing  -            Long Term Goals    LTG 1 Pt to report >/= 75% improvement since initial eval indicating improved function.  -     LTG 2 Pt to improve B UE to 5/5.  -     LTG 3 Pt to report no pain or tenderness with palpation of L UT.  -     LTG 4 Pt to improve all cervical AROM to WFL with no pain reported.  -     LTG 5 Pt to improve B shoulder AROM to WFL.  -     LTG 6 Pt to report no difficulty with reaching behind her back indicating improvement of symptoms.  -     LTG 7 Pt to be I with final HEP.  -     LTG 8 Pt will improve Quick DASH score to </= 10 indicating improvement in function.  -            Time Calculation    PT Goal Re-Cert Due Date 09/12/22  -           User Key  (r) = Recorded By, (t) = Taken By, (c) = Cosigned By    Initials Name  Provider Type    AC Brooke Sheridan, PT Physical Therapist                               Time Calculation:   Start Time: 1048  Stop Time: 1130  Time Calculation (min): 42 min  Therapy Charges for Today     Code Description Service Date Service Provider Modifiers Qty    43066791349 HC PT THER PROC EA 15 MIN 8/29/2022 Brooke Sheridan PT GP 3                    Brooke DEVIN Sheridan , DPT 8/29/2022

## 2022-08-31 ENCOUNTER — HOSPITAL ENCOUNTER (OUTPATIENT)
Dept: PHYSICAL THERAPY | Facility: HOSPITAL | Age: 77
Setting detail: THERAPIES SERIES
Discharge: HOME | End: 2022-08-31

## 2022-08-31 DIAGNOSIS — M19.012 PRIMARY OSTEOARTHRITIS OF LEFT SHOULDER: Primary | ICD-10-CM

## 2022-08-31 PROCEDURE — 97110 THERAPEUTIC EXERCISES: CPT

## 2022-09-07 ENCOUNTER — HOSPITAL ENCOUNTER (OUTPATIENT)
Dept: PHYSICAL THERAPY | Facility: HOSPITAL | Age: 77
Setting detail: THERAPIES SERIES
Discharge: HOME | End: 2022-09-07

## 2022-09-07 DIAGNOSIS — M19.012 PRIMARY OSTEOARTHRITIS OF LEFT SHOULDER: Primary | ICD-10-CM

## 2022-09-07 DIAGNOSIS — S62.521D CLOSED DISPLACED FRACTURE OF DISTAL PHALANX OF RIGHT THUMB WITH ROUTINE HEALING, SUBSEQUENT ENCOUNTER: ICD-10-CM

## 2022-09-07 PROCEDURE — 97110 THERAPEUTIC EXERCISES: CPT | Performed by: PHYSICAL THERAPIST

## 2022-09-07 NOTE — THERAPY TREATMENT NOTE
"    Outpatient Physical Therapy Ortho Treatment Note  [facility]     Patient Name: Sophia Neff  : 1945  MRN: [id number]  Today's Date: 2022      Visit Date: 2022     Patient seen for 5 PT sessions.  Patient reports \"some\"% of improvement.  Next MD appt: N/A.  Recertification: 2022.    Therapy Diagnosis: Shoulder Impingement secondary to primary OA of L shoulder         Visit Dx:    ICD-10-CM ICD-9-CM   1. Primary osteoarthritis of left shoulder  M19.012 715.11   2. Closed displaced fracture of distal phalanx of right thumb with routine healing, subsequent encounter  S62.521D V54.19       Patient Active Problem List   Diagnosis   • Cystocele and rectocele with incomplete uterovaginal prolapse   • Essential hypertension   • Stage 2 chronic kidney disease        Past Medical History:   Diagnosis Date   • Allergic    • Anemia    • Arthritis    • Bell's palsy    • Chronic kidney disease    • Controlled type 2 diabetes mellitus without complication, without long-term current use of insulin ([facility]) 2019   • Diabetes mellitus ([facility])    • Fibromyalgia    • High cholesterol    • Hypertension    • Iron deficiency anemia    • 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  -AJ       Subjective Pain    Able to rate subjective pain? yes  -AJ    Pre-Treatment Pain Level 5  -AJ    Post-Treatment Pain Level 3  -AJ          User Key  (r) = Recorded By, (t) = Taken By, (c) = Cosigned By    Initials Name Provider Type " "   Edna Salguero, PT DPT Physical Therapist                             PT Assessment/Plan     Row Name 09/07/22 1000          PT Assessment    Assessment Comments Patient did well with all new exercises. Near full AAROM with wall slides today. less pain leaving then when she came in. Mild cueing needed to sit upright when performing sitting exercises.  -AJ            PT Plan    PT Frequency 2x/week  -AJ     PT Plan Comments Add supine CH pulls and possibly clocks next session.  -AJ           User Key  (r) = Recorded By, (t) = Taken By, (c) = Cosigned By    Initials Name Provider Type    Edna Salguero, PT DPT Physical Therapist                   OP Exercises     Row Name 09/07/22 1000             Subjective Comments    Subjective Comments Patient reports she is tired and woke up sore all over.  -AJ              Subjective Pain    Able to rate subjective pain? yes  -AJ      Pre-Treatment Pain Level 5  -AJ      Post-Treatment Pain Level 3  -AJ              Exercise 1    Exercise Name 1 Pro II UE bike F/R for posture hood, ROM and endurance  -AJ      Time 1 10 min  -AJ      Additional Comments L 4.5  -AJ              Exercise 2    Exercise Name 2 Pulley's 3-way  -AJ      Time 2 3 min each  -AJ              Exercise 3    Exercise Name 3 B UT S  -AJ      Reps 3 2  -AJ      Time 3 30 seconds  -AJ              Exercise 4    Exercise Name 4 B LS S  -AJ      Reps 4 2  -AJ      Time 4 30 seconds  -AJ              Exercise 5    Exercise Name 5 Scap retraction  -AJ      Sets 5 1  -AJ      Reps 5 20  -AJ      Time 5 5\" hold  -AJ      Additional Comments standing  -AJ              Exercise 6    Exercise Name 6 posterior shoulder rolls between stretches and pulley directions  -AJ      Reps 6 ~10 reps each time  -AJ              Exercise 7    Exercise Name 7 No $ wih Tband  -AJ      Sets 7 2  -AJ      Reps 7 10  -AJ      Time 7 5\" hold  -AJ      Additional Comments YTB  -AJ              Exercise 8    Exercise " "Name 8 Wall walks flex/abd  -      Sets 8 2  -AJ      Reps 8 10 each  -AJ      Time 8 5\" hold end of range  -              Exercise 9    Exercise Name 9 Tband ROws: Lo, Mid  -      Sets 9 2  -AJ      Reps 9 10 each  -AJ      Additional Comments RTB  -              Exercise 10    Exercise Name 10 Tband B shoulder ext  -      Sets 10 2  -AJ      Reps 10 10  -AJ      Time 10 --  -AJ      Additional Comments RTB  -              Exercise 11    Exercise Name 11 Doorway S  -      Reps 11 2  -AJ      Time 11 30 seconds  -            User Key  (r) = Recorded By, (t) = Taken By, (c) = Cosigned By    Initials Name Provider Type    Edna Salguero, PT DPT Physical Therapist            All therapeutic interventions performed today were to address current functional limitations and/or deficits in addressing all physical therapy goals.                    PT OP Goals     Row Name 09/07/22 1000          PT Short Term Goals    STG Date to Achieve 09/12/22  -     STG 1 Patient I with HEP and have additions/changes by next recertification.  -     STG 1 Progress Progressing  -     STG 2 Pt to increase L shoulder flexion AROM to >/= 145 degrees.  -     STG 3 Pt to increase L shoulder abd AROM to >/= 130 degrees.  -     STG 4 Pt to improve MMT of B UE to 4/5 to indicate improved strength.  -     STG 5 Pt to demonstrate proper postural modifications with no cues from PT.  -     STG 5 Progress Ongoing  -            Long Term Goals    LTG 1 Pt to report >/= 75% improvement since initial eval indicating improved function.  -     LTG 2 Pt to improve B UE to 5/5.  -     LTG 3 Pt to report no pain or tenderness with palpation of L UT.  -     LTG 4 Pt to improve all cervical AROM to WFL with no pain reported.  -     LTG 5 Pt to improve B shoulder AROM to WFL.  -     LTG 6 Pt to report no difficulty with reaching behind her back indicating improvement of symptoms.  -     LTG 7 Pt to be I with " final HEP.  -     LTG 8 Pt will improve Quick DASH score to </= 10 indicating improvement in function.  -RIAZ            Time Calculation    PT Goal Re-Cert Due Date 09/12/22  -RIAZ           User Key  (r) = Recorded By, (t) = Taken By, (c) = Cosigned By    Initials Name Provider Type    Edna Salguero, PT DPT Physical Therapist                               Time Calculation:   Start Time: 1015  Stop Time: 1109  Time Calculation (min): 54 min  Total Timed Code Minutes- PT: 54 minute(s)  Therapy Charges for Today     Code Description Service Date Service Provider Modifiers Qty    34198409283  PT THER SUPP EA 15 MIN 9/7/2022 Edna Wells, PT DPT GP 1    34696638082 HC PT THER PROC EA 15 MIN 9/7/2022 Edna Wells, PT DPT GP 4                This document has been electronically signed by Edna Wells PT DPT, [facility] on September 7, 2022 11:10 CDT

## 2022-09-14 ENCOUNTER — HOSPITAL ENCOUNTER (OUTPATIENT)
Dept: PHYSICAL THERAPY | Facility: HOSPITAL | Age: 77
Setting detail: THERAPIES SERIES
Discharge: HOME | End: 2022-09-14

## 2022-09-14 DIAGNOSIS — M19.012 PRIMARY OSTEOARTHRITIS OF LEFT SHOULDER: Primary | ICD-10-CM

## 2022-09-14 PROCEDURE — 97110 THERAPEUTIC EXERCISES: CPT | Performed by: PHYSICAL THERAPIST

## 2022-09-14 NOTE — THERAPY TREATMENT NOTE
"    Outpatient Physical Therapy Ortho Treatment Note  [facility]     Patient Name: Sophia Neff  : 1945  MRN: [id number]  Today's Date: 2022      Visit Date: 2022     Patient seen for 6 PT sessions.  Patient reports \"some\"% of improvement.  Next MD appt: N/A.  Recertification: 2022.     Therapy Diagnosis: Shoulder Impingement secondary to primary OA of L shoulder     Visit Dx:    ICD-10-CM ICD-9-CM   1. Primary osteoarthritis of left shoulder  M19.012 715.11       Patient Active Problem List   Diagnosis   • Cystocele and rectocele with incomplete uterovaginal prolapse   • Essential hypertension   • Stage 2 chronic kidney disease        Past Medical History:   Diagnosis Date   • Allergic    • Anemia    • Arthritis    • Bell's palsy    • Chronic kidney disease    • Controlled type 2 diabetes mellitus without complication, without long-term current use of insulin ([facility]) 2019   • Diabetes mellitus ([facility])    • Fibromyalgia    • High cholesterol    • Hypertension    • Iron deficiency anemia    • 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 Less pain post treatment. Posgresisng well overall. Postural bony changes make it hard to improve shoulder positioning.  -            PT Plan    PT Frequency 2x/week  -     PT Plan Comments Recheck next session. Add wall chicken foot versus slides.  -RIAZ           User Key  (r) = Recorded By, (t) = Taken By, (c) = Cosigned By    Initials Name Provider Type    AJ " "Edna Wells, PT DPT Physical Therapist                   OP Exercises     Row Name 09/14/22 1400             Subjective Comments    Subjective Comments Patient rpeorts it really only bothers her is she raches behind her back like when she has to unhook her bra.  -AJ              Subjective Pain    Able to rate subjective pain? yes  -AJ      Pre-Treatment Pain Level 2  -AJ      Post-Treatment Pain Level 0  -AJ              Exercise 1    Exercise Name 1 Pro II UE bike F/R for posture hood, ROM and endurance  -AJ      Time 1 10 min  -AJ      Additional Comments L 4.5  -AJ              Exercise 2    Exercise Name 2 Pulley's 3-way  -AJ      Time 2 3 min each  -AJ              Exercise 3    Exercise Name 3 St. wand extensions  -AJ      Sets 3 2  -AJ      Reps 3 10  -AJ      Time 3 5\" hold  -AJ              Exercise 4    Exercise Name 4 St. wand IR (side up back)  -AJ      Sets 4 2  -AJ      Reps 4 10  -AJ      Time 4 5\" hold  -AJ              Exercise 5    Exercise Name 5 Wall slides: flex/abd  -AJ      Reps 5 15 each  -AJ      Time 5 5\" hold  -AJ              Exercise 6    Exercise Name 6 Tband ROws: Low  -AJ      Reps 6 20  -AJ      Additional Comments RTB  -AJ              Exercise 7    Exercise Name 7 Tband B shoulder ext  -AJ      Reps 7 20  -AJ      Additional Comments RTB  -AJ              Exercise 8    Exercise Name 8 Tband Semi-reclined CH pulls  -AJ      Sets 8 2  -AJ      Reps 8 10  -AJ      Additional Comments YTB  -AJ              Exercise 9    Exercise Name 9 Tband Semi-reclined Cocks \"X\"  -AJ      Sets 9 2  -AJ      Reps 9 10 each way  -AJ      Additional Comments YTB  -AJ              Exercise 10    Exercise Name 10 Doorway S  -AJ      Reps 10 2  -AJ      Time 10 30 seconds  -AJ            User Key  (r) = Recorded By, (t) = Taken By, (c) = Cosigned By    Initials Name Provider Type    AJ Edna Wells, PT DPT Physical Therapist            All therapeutic interventions performed today were " to address current functional limitations and/or deficits in addressing all physical therapy goals.                    PT OP Goals     Row Name 09/14/22 1400          PT Short Term Goals    STG Date to Achieve 09/12/22  -     STG 1 Patient I with HEP and have additions/changes by next recertification.  -     STG 1 Progress Progressing  -     STG 2 Pt to increase L shoulder flexion AROM to >/= 145 degrees.  -     STG 3 Pt to increase L shoulder abd AROM to >/= 130 degrees.  -     STG 4 Pt to improve MMT of B UE to 4/5 to indicate improved strength.  -     STG 5 Pt to demonstrate proper postural modifications with no cues from PT.  -     STG 5 Progress Ongoing  -            Long Term Goals    LTG 1 Pt to report >/= 75% improvement since initial eval indicating improved function.  -     LTG 2 Pt to improve B UE to 5/5.  -     LTG 3 Pt to report no pain or tenderness with palpation of L UT.  -     LTG 4 Pt to improve all cervical AROM to WFL with no pain reported.  -     LTG 5 Pt to improve B shoulder AROM to WFL.  -     LTG 6 Pt to report no difficulty with reaching behind her back indicating improvement of symptoms.  -     LTG 7 Pt to be I with final HEP.  -     LTG 8 Pt will improve Quick DASH score to </= 10 indicating improvement in function.  -            Time Calculation    PT Goal Re-Cert Due Date 09/12/22  -           User Key  (r) = Recorded By, (t) = Taken By, (c) = Cosigned By    Initials Name Provider Type    AJ Edna Wells, PT DPT Physical Therapist                               Time Calculation:   Start Time: 1420  Stop Time: 1516  Time Calculation (min): 56 min  Total Timed Code Minutes- PT: 56 minute(s)  Therapy Charges for Today     Code Description Service Date Service Provider Modifiers Qty    54481885568 HC PT THER SUPP EA 15 MIN 9/14/2022 Edna Wells, PT DPT GP 1    00953442223 HC PT THER PROC EA 15 MIN 9/14/2022 dEna Wells, PT DPT GP  4                  This document has been electronically signed by Edna Wells, PT DPT, [facility] on September 14, 2022 15:21 CDT

## 2022-09-16 ENCOUNTER — APPOINTMENT (OUTPATIENT)
Dept: PHYSICAL THERAPY | Facility: HOSPITAL | Age: 77
End: 2022-09-16

## 2022-09-19 ENCOUNTER — HOSPITAL ENCOUNTER (OUTPATIENT)
Dept: PHYSICAL THERAPY | Facility: HOSPITAL | Age: 77
Setting detail: THERAPIES SERIES
Discharge: HOME | End: 2022-09-19

## 2022-09-19 DIAGNOSIS — M19.012 PRIMARY OSTEOARTHRITIS OF LEFT SHOULDER: Primary | ICD-10-CM

## 2022-09-19 PROCEDURE — 97530 THERAPEUTIC ACTIVITIES: CPT | Performed by: PHYSICAL THERAPIST

## 2022-09-19 PROCEDURE — 97110 THERAPEUTIC EXERCISES: CPT | Performed by: PHYSICAL THERAPIST

## 2022-09-19 NOTE — THERAPY PROGRESS REPORT/RE-CERT
Outpatient Physical Therapy Ortho Progress Note  [facility]     Patient Name: Sophia Neff  : 1945  MRN: [id number]  Today's Date: 2022      Visit Date: 2022     Patient seen for 7 PT sessions.  Patient reports 90+% of improvement.  Next MD appt: N/A.  Recertification: N/A.    Therapy Diagnosis: Shoulder Impingement secondary to primary OA of L shoulder         Patient Active Problem List   Diagnosis   • Cystocele and rectocele with incomplete uterovaginal prolapse   • Essential hypertension   • Stage 2 chronic kidney disease        Past Medical History:   Diagnosis Date   • Allergic    • Anemia    • Arthritis    • Bell's palsy    • Chronic kidney disease    • Controlled type 2 diabetes mellitus without complication, without long-term current use of insulin ([facility]) 2019   • Diabetes mellitus ([facility])    • Fibromyalgia    • High cholesterol    • Hypertension    • Iron deficiency anemia    • 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 Primary osteoarthritis of left shoulder  M19.012 715.11              PT Ortho     Row Name 22 0800       Posture/Observations    Alignment Options Forward head;Cervical lordosis;Thoracic kyphosis;Rounded shoulders  -AJ    Forward Head Moderate;Increased  -AJ    Cervical Lordosis Moderate;Decreased  flattening  -AJ    Thoracic Kyphosis Moderate;Increased  -AJ    Rounded Shoulders Bilateral:;Moderate;Increased  -AJ    Posture/Observations Comments No distress, age related postural changes due to age related spinal changes.  -AJ       Shoulder Impingement/Rotator Cuff  Special Tests    Castillo-Rojelio Test (RC Lesion vs. Bursitis) Left:;Positive  -AJ    Neer Impingement Test (RC Lesion vs. Bursitis) Left:;Positive  -AJ    Full Can Test (RC Lesion) Bilateral:;Negative  -AJ    Empty Can Test (RC Lesion) Bilateral:;Negative  -AJ    Drop Arm Test (Full Thickness RC Lesion) Bilateral:;Negative  -AJ       Shoulder Laxity/Instability Special Tests    Sulcus Sign, 0 Degrees Left:;Negative  -AJ       Shoulder Girdle Palpation    AC Joint Left:;Tender  -AJ    Upper Trap Left:;Guarded/taut  -AJ    Levator Scapula Left:;Tender;Guarded/taut  -AJ       Left Upper Ext    Lt Shoulder Abduction AROM 85° pain-free, can go to 130°, just painful over 90°  -AJ    Lt Shoulder Flexion AROM 120°, can go to 135° with pain.  -AJ    Lt Shoulder Internal Rotation AROM IR to SI joint with out pain, to T8 with pain.  -       MMT (Manual Muscle Testing)    General MMT Comments B UE 5/5, but painful on the L.  -AJ       Sensation    Sensation WNL? WFL  -       Transfers    Comment, (Transfers) I with all transfers.  -       Gait/Stairs (Locomotion)    Comment, (Gait/Stairs) FWB, non-antalgic gait, no assistive device, no significant deviations noted, normal arm swing with gait.  -          User Key  (r) = Recorded By, (t) = Taken By, (c) = Cosigned By    Initials Name Provider Type    Edna Salguero, PT DPT Physical Therapist                                   PT OP Goals     Row Name 09/19/22 0800          PT Short Term Goals    STG Date to Achieve 09/12/22  -     STG 1 Patient I with HEP and have additions/changes by next recertification.  -     STG 1 Progress Met  -     STG 2 Pt to increase L shoulder flexion AROM to >/= 145 degrees.  -     STG 2 Progress Not Met  -     STG 3 Pt to increase L shoulder abd AROM to >/= 130 degrees.  -     STG 3 Progress Not Met  -     STG 4 Pt to improve MMT of B UE to 4/5 to indicate improved strength.  -     STG 5 Pt to demonstrate proper  postural modifications with no cues from PT.  -     STG 5 Progress Partially Met  -     STG 5 Progress Comments Improved awareness but still requires some cueing. Also has age related spine changes that contribute.  -            Long Term Goals    LTG 1 Pt to report >/= 75% improvement since initial eval indicating improved function.  -     LTG 1 Progress Met  -     LTG 2 Pt to improve B UE to 5/5.  -     LTG 3 Pt to report no pain or tenderness with palpation of L UT.  -     LTG 3 Progress Met  -     LTG 4 Pt to improve all cervical AROM to WFL with no pain reported.  -AJ     LTG 4 Progress Met  -     LTG 5 Pt to improve B shoulder AROM to WFL.  -     LTG 5 Progress Partially Met  -     LTG 6 Pt to report no difficulty with reaching behind her back indicating improvement of symptoms.  -     LTG 6 Progress Not Met  -     LTG 7 Pt to be I with final HEP.  -     LTG 8 Pt will improve Quick DASH score to </= 10 indicating improvement in function.  -     LTG 8 Progress Not Met  -     LTG 8 Progress Comments Subjective goal  -           User Key  (r) = Recorded By, (t) = Taken By, (c) = Cosigned By    Initials Name Provider Type    Edna Salguero, PT DPT Physical Therapist              Barriers to Rehab: Include significant or possible arthritic/degenerative changes that have occurred within the joint and spine, The chronicity of this issue.       PT Assessment/Plan     Row Name 09/19/22 0800          PT Assessment    Functional Limitations Limitation in home management;Limitations in functional capacity and performance;Performance in leisure activities;Limitations in community activities  -     Impairments Impaired muscle endurance;Impaired muscle power;Impaired flexibility;Joint integrity;Joint mobility;Pain;Posture;Range of motion;Impaired muscle length  -     Assessment Comments Patient is still limited by pain as primary vomplaint. Minimal improvement with AROM due o  pain, but improved overall strength with no strength deficits noted. Patient also has WFL AROM for the cervical spine. Patient is alos reporting high subjective improvement.  -AJ     Please refer to paper survey for additional self-reported information No  -AJ     Rehab Potential Fair  -AJ     Patient/caregiver participated in establishment of treatment plan and goals Yes  -AJ            PT Plan    PT Frequency --  -AJ     Predicted Duration of Therapy Intervention (PT) 1 more visit to ensur I with HEP and then D/C to an I program.  -AJ     PT Plan Comments D/C at next visit with a final HEP. Review wand exercises and remaining scapular stab ther ex.  -AJ           User Key  (r) = Recorded By, (t) = Taken By, (c) = Cosigned By    Initials Name Provider Type    Edna Salguero, PT DPT Physical Therapist            Other therapeutic activities and/or exercises will be prescribed depending on the patient's progress or lack thereof.         OP Exercises     Row Name 09/19/22 0800             Subjective Comments    Subjective Comments Patient reports her pain is comeand go. She reports it depends on what she is doing.  -AJ              Subjective Pain    Able to rate subjective pain? yes  -AJ      Pre-Treatment Pain Level 0  -AJ      Post-Treatment Pain Level 0  -AJ              Exercise 1    Exercise Name 1 Pro II UE bike F/R for posture hood, ROM and endurance  -AJ      Time 1 10 min  -AJ      Additional Comments L 5.0  -AJ              Exercise 2    Exercise Name 2 measurements  -AJ              Exercise 3    Exercise Name 3 wall slides Chicken foot: flex/abd  -AJ      Reps 3 10 each  -AJ              Exercise 4    Exercise Name 4 Tband ROws: Low  -AJ      Sets 4 2  -AJ      Reps 4 10  -AJ      Additional Comments RTB  -AJ              Exercise 5    Exercise Name 5 Tband B shoulder ext  -AJ      Sets 5 2  -AJ      Reps 5 10  -AJ      Additional Comments RTB  -AJ              Exercise 6    Exercise Name 6  "Tband L shoulder abd/add/flex/IR/ER  -      Sets 6 2  -AJ      Reps 6 10  -AJ      Additional Comments RTB  -              Exercise 7    Exercise Name 7 Semireclined CH pulls  -      Sets 7 2  -AJ      Reps 7 10  -AJ      Additional Comments YTB  -              Exercise 8    Exercise Name 8 Doorway S  -      Sets 8 2  -AJ      Reps 8 10  -AJ      Time 8 5\" hold  -            User Key  (r) = Recorded By, (t) = Taken By, (c) = Cosigned By    Initials Name Provider Type    Edna Salguero, PT DPT Physical Therapist            All therapeutic interventions performed today were to address current functional limitations and/or deficits in addressing all physical therapy goals.                    Outcome Measure Options: Quick DASH  Quick DASH  Open a tight or new jar.: No Difficulty  Do heavy household chores (e.g., wash walls, wash floors): No Difficulty  Carry a shopping bag or briefcase: No Difficulty  Wash your back: No Difficulty  Use a knife to cut food: No Difficulty  Recreational activities in which you take some force or impact through your arm, should or hand (e.g. golf, hammering, tennis, etc.): Mild Difficulty  During the past week, to what extent has your arm, shoulder, or hand problem interfered with your normal social activites with family, friends, neighbors or groups?: Slightly  During the past week, were you limited in your work or other regular daily activities as a result of your arm, shoulder or hand problem?: Slightly Limited  Arm, Shoulder, or hand pain: Moderate  Tingling (pins and needles) in your arm, shoulder, or hand: None  During the past week, how much difficulty have you had sleeping because of the pain in your arm, shoulder or hand?: Mild Difficulty  Number of Questions Answered: 11  Quick DASH Score: 13.64         Time Calculation:     Start Time: 0833  Stop Time: 0918  Time Calculation (min): 45 min  Total Timed Code Minutes- PT: 45 minute(s)     Therapy Charges for " Today     Code Description Service Date Service Provider Modifiers Qty    63150468278  PT THER SUPP EA 15 MIN 9/19/2022 Edna Wells, PT DPT GP 1    69773862534 HC PT THERAPEUTIC ACT EA 15 MIN 9/19/2022 Edna Wells, PT DPT GP 1    71249182381  PT THER PROC EA 15 MIN 9/19/2022 Edna Wells, PT DPT GP 2          PT G-Codes  Outcome Measure Options: Quick DASH  Quick DASH Score: 13.64       This document has been electronically signed by Edna Wells, PT DPT, [facility] on September 19, 2022 10:17 CDT

## 2022-09-21 ENCOUNTER — HOSPITAL ENCOUNTER (OUTPATIENT)
Dept: PHYSICAL THERAPY | Facility: HOSPITAL | Age: 77
Setting detail: THERAPIES SERIES
Discharge: HOME | End: 2022-09-21

## 2022-09-21 DIAGNOSIS — M19.012 PRIMARY OSTEOARTHRITIS OF LEFT SHOULDER: Primary | ICD-10-CM

## 2022-09-21 PROCEDURE — 97110 THERAPEUTIC EXERCISES: CPT

## 2022-09-21 NOTE — THERAPY DISCHARGE NOTE
Outpatient Physical Therapy Ortho Treatment Note/Discharge Summary  [facility]     Patient Name: Sophia Neff  : 1945  MRN: [id number]  Today's Date: 2022     Pt seen for 8 PT sessions  Reported Improvement:  90+ %  MD Visit: N/A  Recheck Date: N/A    Therapy Diagnosis:  Shoulder Impingement secondary to primary OA of L shoulder         Visit Date: 2022    Visit Dx:    ICD-10-CM ICD-9-CM   1. 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  States she has previously issued handouts for written reference at home.  States the shoulder still hurts her at times and is stiff.  Pt education provided on realist expectations and outcomes as well as importance of postural awareness for managing s/s.  Pt verbalizes understanding.  Issued green tband with educaiton on proper progression of  HEP.  -JW            PT Plan    PT Plan Comments DC with goals partially met.  -JW           User Key  (r) = Recorded By, (t) = Taken By, (c) = Cosigned By    Initials Name Provider Type    Shital Cadet PTA Physical Therapist Assistant                     OP Exercises     Row Name 09/21/22 0900             Subjective Comments    Subjective Comments Feeling stiff this morning  -JW              Subjective Pain    Able to rate subjective pain? yes  -JW      Pre-Treatment Pain Level 3  -JW      Post-Treatment Pain Level --  leaves in no distress  -JW              Exercise 1    Exercise Name 1 Pro II UE bike F/R for posture hood, ROM and endurance  -JW      Time 1 10 min  -JW      Additional Comments L 5.0  -JW              Exercise 2    Exercise Name 2 Pulley's 3 way  -JW      Time 2 3 min ea direction  -JW              Exercise 3    Exercise Name 3 Tband Rows - low  -JW      Reps 3 10  -JW      Additional Comments RTB  -JW              Exercise 4    Exercise Name 4 Tband B shoulder ext  -JW      Reps 4 10  -JW      Additional Comments RTB  -JW              Exercise 5    Exercise Name 5 Tband Rows - Mid  -JW      Reps 5 10  -JW      Time 5 RTB  -JW              Exercise 6    Exercise Name 6 Standing Wand flexion  -JW      Reps 6 10  -JW              Exercise 7    Exercise Name 7 Standing Wand ABD  -JW      Reps 7 10  -JW              Exercise 8    Exercise Name 8 Standingwand shoulder extensoin  -JW      Reps 8 10  -JW              Exercise 9    Exercise Name 9 Standing Wand IR (slide up back)  -JW      Reps 9 10  -JW              Exercise 10    Exercise Name 10 Scap retraction  -JW      Time 10 10  -JW            User Key  (r) = Recorded By, (t) = Taken By, (c) = Cosigned By    Initials Name Provider Type    Shital Cadet PTA Physical Therapist Assistant                                PT OP Goals     Row Name 09/21/22 0900          PT Short Term Goals    STG Date to Achieve 09/12/22  -JW     STG 1 Patient I  with HEP and have additions/changes by next recertification.  -     STG 1 Progress Met  -JW     STG 2 Pt to increase L shoulder flexion AROM to >/= 145 degrees.  -JW     STG 2 Progress Not Met  -JW     STG 3 Pt to increase L shoulder abd AROM to >/= 130 degrees.  -JW     STG 3 Progress Not Met  -JW     STG 4 Pt to improve MMT of B UE to 4/5 to indicate improved strength.  -JW     STG 4 Progress Not Met  -JW     STG 5 Pt to demonstrate proper postural modifications with no cues from PT.  -JW     STG 5 Progress Partially Met  -JW            Long Term Goals    LTG 1 Pt to report >/= 75% improvement since initial eval indicating improved function.  -JW     LTG 1 Progress Met  -JW     LTG 2 Pt to improve B UE to 5/5.  -JW     LTG 2 Progress Not Met  -JW     LTG 3 Pt to report no pain or tenderness with palpation of L UT.  -JW     LTG 3 Progress Met  -JW     LTG 4 Pt to improve all cervical AROM to WFL with no pain reported.  -     LTG 4 Progress Met  -JW     LTG 5 Pt to improve B shoulder AROM to WFL.  -JW     LTG 5 Progress Partially Met  -JW     LTG 6 Pt to report no difficulty with reaching behind her back indicating improvement of symptoms.  -JW     LTG 6 Progress Not Met  -JW     LTG 7 Pt to be I with final HEP.  -     LTG 7 Progress Met  -JW     LTG 8 Pt will improve Quick DASH score to </= 10 indicating improvement in function.  -     LTG 8 Progress Not Met  -JW           User Key  (r) = Recorded By, (t) = Taken By, (c) = Cosigned By    Initials Name Provider Type    Shital Cadet PTA Physical Therapist Assistant                               Time Calculation:   Start Time: 0915  Stop Time: 0958  Time Calculation (min): 43 min  Therapy Charges for Today     Code Description Service Date Service Provider Modifiers Qty    40162825197 HC PT THER PROC EA 15 MIN 9/21/2022 Shital Booker PTA GP, CQ 3    48334783366 HC PT THER SUPP EA 15 MIN 9/21/2022 Shital Booker PTA GP, CQ 1                        Shital Booker, PTA  9/21/2022

## 2022-11-28 ENCOUNTER — TELEMEDICINE (OUTPATIENT)
Dept: ENDOCRINOLOGY | Facility: CLINIC | Age: 77
End: 2022-11-28

## 2022-11-28 DIAGNOSIS — E10.69 MIXED DIABETIC HYPERLIPIDEMIA ASSOCIATED WITH TYPE 1 DIABETES MELLITUS: ICD-10-CM

## 2022-11-28 DIAGNOSIS — N18.32 CHRONIC KIDNEY DISEASE (CKD) STAGE G3B/A3, MODERATELY DECREASED GLOMERULAR FILTRATION RATE (GFR) BETWEEN 30-44 ML/MIN/1.73 SQUARE METER AND ALBUMINURIA CREATININE RATIO GREATER THAN 300 MG/G (C*: ICD-10-CM

## 2022-11-28 DIAGNOSIS — E11.59 HYPERTENSION ASSOCIATED WITH DIABETES: ICD-10-CM

## 2022-11-28 DIAGNOSIS — E78.2 MIXED DIABETIC HYPERLIPIDEMIA ASSOCIATED WITH TYPE 1 DIABETES MELLITUS: ICD-10-CM

## 2022-11-28 DIAGNOSIS — I15.2 HYPERTENSION ASSOCIATED WITH DIABETES: ICD-10-CM

## 2022-11-28 DIAGNOSIS — N18.31 TYPE 2 DIABETES MELLITUS WITH STAGE 3A CHRONIC KIDNEY DISEASE, WITHOUT LONG-TERM CURRENT USE OF INSULIN: Primary | ICD-10-CM

## 2022-11-28 DIAGNOSIS — E11.22 TYPE 2 DIABETES MELLITUS WITH STAGE 3A CHRONIC KIDNEY DISEASE, WITHOUT LONG-TERM CURRENT USE OF INSULIN: Primary | ICD-10-CM

## 2022-11-28 PROCEDURE — 99214 OFFICE O/P EST MOD 30 MIN: CPT | Performed by: INTERNAL MEDICINE

## 2022-11-28 NOTE — PROGRESS NOTES
Sophia Neff is a 77 y.o. female who presents for  evaluation of   Diabetes                                        Mode of Visit: Video  Location of patient: Home  You have chosen to receive care through a telehealth visit.  Does the patient consent to use a video/audio connection for your medical care today? Yes  The visit included audio and video interaction. No technical issues occurred during this visit        HPI     75 yo female with T2DM for more than 10 years complicated by stage 3 CKD  Diabetes well controlled    PE    There were no vitals taken for this visit.  AOx3  No visible goiter  Normal Respiratory Effort   No Edema    Labs    Lab Results   Component Value Date    WBC 5.86 04/26/2022    HGB 12.5 04/26/2022    HCT 36.3 04/26/2022    MCV 87.1 04/26/2022     (L) 04/26/2022     Lab Results   Component Value Date    GLUCOSE 97 04/26/2022    BUN 19 04/26/2022    CREATININE 1.09 (H) 04/26/2022    EGFRIFNONA 52 (L) 12/10/2021    BCR 17.4 04/26/2022     04/26/2022    K 4.5 04/26/2022    CO2 22.0 04/26/2022    CALCIUM 9.6 04/26/2022    ALBUMIN 4.00 04/26/2022    AST 23 04/26/2022    ALT 17 04/26/2022                   Assessment & Plan       ICD-10-CM ICD-9-CM   1. Type 2 diabetes mellitus with stage 3a chronic kidney disease, without long-term current use of insulin ([facility])  E11.22 250.40    N18.31 585.3   2. Mixed diabetic hyperlipidemia associated with type 1 diabetes mellitus ([facility])  E10.69 250.81    E78.2 272.2   3. Hypertension associated with diabetes ([facility])  E11.59 250.80    I15.2 401.9   4. Chronic kidney disease (CKD) stage G3b/A3, moderately decreased glomerular filtration rate (GFR) between 30-44 mL/min/1.73 square meter and albuminuria creatinine ratio greater than 300 mg/g (C* ([facility])  N18.32 585.3           T2DM    Glycemic Management:   Lab Results   Component Value Date    HGBA1C 5.70 (H) 04/26/2022    HGBA1C 5.60 03/14/2022    HGBA1C 5.73 (H) 12/10/2021         trulicity  4.5 and jardiance 10     Lipid Management    Lab Results   Component Value Date    CHOL 170 04/26/2022    TRIG 95 04/26/2022    HDL 48 04/26/2022     (H) 04/26/2022         Pravastatin 40 mg qhs - now taking 20 mg         Blood Pressure Management:    There were no vitals filed for this visit.  Lab Results   Component Value Date    GLUCOSE 97 04/26/2022    CALCIUM 9.6 04/26/2022     04/26/2022    K 4.5 04/26/2022    CO2 22.0 04/26/2022     04/26/2022    BUN 19 04/26/2022    CREATININE 1.09 (H) 04/26/2022    EGFRIFNONA 52 (L) 12/10/2021    BCR 17.4 04/26/2022    ANIONGAP 13.0 04/26/2022         Nl bp on hydralazine and losartan 100 mg daily     On chlorthalidone, stopped before by me when I added jardiance        Microvascular Complication Monitoring:      Eye Exam Evaluation  Within 1 year , 2022 , normal   -----------    Last Microalbumin-Proteinuria Assessment    Lab Results   Component Value Date    MALBCRERATIO 174.5 04/26/2022    MALBCRERATIO 148.5 03/14/2022    MALBCRERATIO 306.9 03/15/2021       No results found for: UTPCR     Stage 3 a / A 3 -- now A2    On kerendia 20 mg daily - not paid     On jardiance and losartan     -----------      Neuropathy, none            Weight Related:   Wt Readings from Last 3 Encounters:   11/08/21 74.8 kg (165 lb)   07/15/21 78.5 kg (173 lb)   11/16/20 77.7 kg (171 lb 3.2 oz)     There is no height or weight on file to calculate BMI.        Diet interventions: moderate (500 kCal/d) deficit diet.      Bone Health    Lab Results   Component Value Date    CALCIUM 9.6 04/26/2022    TVVQ08GB 51.8 04/26/2022       Thyroid Health    Lab Results   Component Value Date    TSH 1.880 04/26/2022    TSH 1.400 12/10/2021    TSH 1.490 03/12/2021            Other Diabetes Related Aspects       Lab Results   Component Value Date    [id number] 556 04/26/2022            This document has been electronically signed by Héctor Velazquez MD on November 28, 2022  16:56 CST

## 2022-12-06 ENCOUNTER — LAB (OUTPATIENT)
Dept: LAB | Facility: HOSPITAL | Age: 77
End: 2022-12-06

## 2022-12-06 DIAGNOSIS — N18.32 CHRONIC KIDNEY DISEASE (CKD) STAGE G3B/A3, MODERATELY DECREASED GLOMERULAR FILTRATION RATE (GFR) BETWEEN 30-44 ML/MIN/1.73 SQUARE METER AND ALBUMINURIA CREATININE RATIO GREATER THAN 300 MG/G (C*: ICD-10-CM

## 2022-12-06 DIAGNOSIS — E10.69 MIXED DIABETIC HYPERLIPIDEMIA ASSOCIATED WITH TYPE 1 DIABETES MELLITUS: ICD-10-CM

## 2022-12-06 DIAGNOSIS — E78.2 MIXED DIABETIC HYPERLIPIDEMIA ASSOCIATED WITH TYPE 1 DIABETES MELLITUS: ICD-10-CM

## 2022-12-06 DIAGNOSIS — E11.22 TYPE 2 DIABETES MELLITUS WITH STAGE 3A CHRONIC KIDNEY DISEASE, WITHOUT LONG-TERM CURRENT USE OF INSULIN: ICD-10-CM

## 2022-12-06 DIAGNOSIS — E11.59 HYPERTENSION ASSOCIATED WITH DIABETES: ICD-10-CM

## 2022-12-06 DIAGNOSIS — E55.9 VITAMIN D DEFICIENCY: ICD-10-CM

## 2022-12-06 DIAGNOSIS — N18.31 TYPE 2 DIABETES MELLITUS WITH STAGE 3A CHRONIC KIDNEY DISEASE, WITHOUT LONG-TERM CURRENT USE OF INSULIN: ICD-10-CM

## 2022-12-06 DIAGNOSIS — I15.2 HYPERTENSION ASSOCIATED WITH DIABETES: ICD-10-CM

## 2022-12-06 LAB
25(OH)D3 SERPL-MCNC: 56.5 NG/ML (ref 30–100)
ALBUMIN SERPL-MCNC: 3.9 G/DL (ref 3.5–5.2)
ALBUMIN UR-MCNC: 22.2 MG/DL
ALBUMIN/GLOB SERPL: 1.5 G/DL
ALP SERPL-CCNC: 107 U/L (ref 39–117)
ALT SERPL W P-5'-P-CCNC: 16 U/L (ref 1–33)
ANION GAP SERPL CALCULATED.3IONS-SCNC: 8 MMOL/L (ref 5–15)
AST SERPL-CCNC: 24 U/L (ref 1–32)
BASOPHILS # BLD AUTO: 0.06 10*3/MM3 (ref 0–0.2)
BASOPHILS NFR BLD AUTO: 0.9 % (ref 0–1.5)
BILIRUB SERPL-MCNC: 0.4 MG/DL (ref 0–1.2)
BUN SERPL-MCNC: 18 MG/DL (ref 8–23)
BUN/CREAT SERPL: 14.5 (ref 7–25)
CALCIUM SPEC-SCNC: 9.6 MG/DL (ref 8.6–10.5)
CHLORIDE SERPL-SCNC: 105 MMOL/L (ref 98–107)
CHOLEST SERPL-MCNC: 143 MG/DL (ref 0–200)
CO2 SERPL-SCNC: 28 MMOL/L (ref 22–29)
CREAT SERPL-MCNC: 1.24 MG/DL (ref 0.57–1)
CREAT UR-MCNC: 58.2 MG/DL
DEPRECATED RDW RBC AUTO: 44.3 FL (ref 37–54)
EGFRCR SERPLBLD CKD-EPI 2021: 44.6 ML/MIN/1.73
EOSINOPHIL # BLD AUTO: 0.19 10*3/MM3 (ref 0–0.4)
EOSINOPHIL NFR BLD AUTO: 2.8 % (ref 0.3–6.2)
ERYTHROCYTE [DISTWIDTH] IN BLOOD BY AUTOMATED COUNT: 13.7 % (ref 12.3–15.4)
GLOBULIN UR ELPH-MCNC: 2.6 GM/DL
GLUCOSE SERPL-MCNC: 117 MG/DL (ref 65–99)
HBA1C MFR BLD: 5.6 % (ref 4.8–5.6)
HCT VFR BLD AUTO: 35.4 % (ref 34–46.6)
HDLC SERPL-MCNC: 49 MG/DL (ref 40–60)
HGB BLD-MCNC: 11.1 G/DL (ref 12–15.9)
IMM GRANULOCYTES # BLD AUTO: 0.02 10*3/MM3 (ref 0–0.05)
IMM GRANULOCYTES NFR BLD AUTO: 0.3 % (ref 0–0.5)
LDLC SERPL CALC-MCNC: 80 MG/DL (ref 0–100)
LDLC/HDLC SERPL: 1.63 {RATIO}
LYMPHOCYTES # BLD AUTO: 1.18 10*3/MM3 (ref 0.7–3.1)
LYMPHOCYTES NFR BLD AUTO: 17.2 % (ref 19.6–45.3)
MCH RBC QN AUTO: 28.2 PG (ref 26.6–33)
MCHC RBC AUTO-ENTMCNC: 31.4 G/DL (ref 31.5–35.7)
MCV RBC AUTO: 89.8 FL (ref 79–97)
MICROALBUMIN/CREAT UR: 381.4 MG/G
MONOCYTES # BLD AUTO: 0.51 10*3/MM3 (ref 0.1–0.9)
MONOCYTES NFR BLD AUTO: 7.4 % (ref 5–12)
NEUTROPHILS NFR BLD AUTO: 4.89 10*3/MM3 (ref 1.7–7)
NEUTROPHILS NFR BLD AUTO: 71.4 % (ref 42.7–76)
NRBC BLD AUTO-RTO: 0 /100 WBC (ref 0–0.2)
PLATELET # BLD AUTO: 232 10*3/MM3 (ref 140–450)
PMV BLD AUTO: 10.9 FL (ref 6–12)
POTASSIUM SERPL-SCNC: 4.9 MMOL/L (ref 3.5–5.2)
PROT SERPL-MCNC: 6.5 G/DL (ref 6–8.5)
RBC # BLD AUTO: 3.94 10*6/MM3 (ref 3.77–5.28)
SODIUM SERPL-SCNC: 141 MMOL/L (ref 136–145)
TRIGL SERPL-MCNC: 71 MG/DL (ref 0–150)
TSH SERPL DL<=0.05 MIU/L-ACNC: 1.71 UIU/ML (ref 0.27–4.2)
VIT B12 BLD-MCNC: 675 PG/ML (ref 211–946)
VLDLC SERPL-MCNC: 14 MG/DL (ref 5–40)
WBC NRBC COR # BLD: 6.85 10*3/MM3 (ref 3.4–10.8)

## 2022-12-06 PROCEDURE — 80053 COMPREHEN METABOLIC PANEL: CPT

## 2022-12-06 PROCEDURE — 80061 LIPID PANEL: CPT

## 2022-12-06 PROCEDURE — 83036 HEMOGLOBIN GLYCOSYLATED A1C: CPT

## 2022-12-06 PROCEDURE — 82043 UR ALBUMIN QUANTITATIVE: CPT

## 2022-12-06 PROCEDURE — 82306 VITAMIN D 25 HYDROXY: CPT

## 2022-12-06 PROCEDURE — 85025 COMPLETE CBC W/AUTO DIFF WBC: CPT

## 2022-12-06 PROCEDURE — 82570 ASSAY OF URINE CREATININE: CPT

## 2022-12-06 PROCEDURE — 84443 ASSAY THYROID STIM HORMONE: CPT

## 2022-12-06 PROCEDURE — 82607 VITAMIN B-12: CPT

## 2022-12-13 DIAGNOSIS — E11.22 TYPE 2 DIABETES MELLITUS WITH STAGE 3A CHRONIC KIDNEY DISEASE, WITHOUT LONG-TERM CURRENT USE OF INSULIN: Primary | ICD-10-CM

## 2022-12-13 DIAGNOSIS — N18.32 CHRONIC KIDNEY DISEASE (CKD) STAGE G3B/A3, MODERATELY DECREASED GLOMERULAR FILTRATION RATE (GFR) BETWEEN 30-44 ML/MIN/1.73 SQUARE METER AND ALBUMINURIA CREATININE RATIO GREATER THAN 300 MG/G (C*: ICD-10-CM

## 2022-12-13 DIAGNOSIS — N18.31 TYPE 2 DIABETES MELLITUS WITH STAGE 3A CHRONIC KIDNEY DISEASE, WITHOUT LONG-TERM CURRENT USE OF INSULIN: Primary | ICD-10-CM

## 2022-12-19 ENCOUNTER — DOCUMENTATION (OUTPATIENT)
Dept: ENDOCRINOLOGY | Facility: CLINIC | Age: 77
End: 2022-12-19

## 2022-12-19 NOTE — PROGRESS NOTES
Patient is given lab results and told everything is good. She is told to repeat labs one week before her next appointment.

## 2023-03-06 ENCOUNTER — TRANSCRIBE ORDERS (OUTPATIENT)
Dept: PHYSICAL THERAPY | Facility: HOSPITAL | Age: 78
End: 2023-03-06
Payer: MEDICARE

## 2023-03-06 DIAGNOSIS — M75.42 IMPINGEMENT SYNDROME OF LEFT SHOULDER: Primary | ICD-10-CM

## 2023-03-23 ENCOUNTER — TRANSCRIBE ORDERS (OUTPATIENT)
Dept: PHYSICAL THERAPY | Facility: HOSPITAL | Age: 78
End: 2023-03-23
Payer: MEDICARE

## 2023-03-23 DIAGNOSIS — M75.102 ROTATOR CUFF SYNDROME OF LEFT SHOULDER: Primary | ICD-10-CM

## 2023-03-27 ENCOUNTER — HOSPITAL ENCOUNTER (OUTPATIENT)
Dept: PHYSICAL THERAPY | Facility: HOSPITAL | Age: 78
Setting detail: THERAPIES SERIES
Discharge: HOME | End: 2023-03-27
Payer: MEDICARE

## 2023-03-27 DIAGNOSIS — M25.512 CHRONIC LEFT SHOULDER PAIN: ICD-10-CM

## 2023-03-27 DIAGNOSIS — M75.102 ROTATOR CUFF SYNDROME OF LEFT SHOULDER: ICD-10-CM

## 2023-03-27 DIAGNOSIS — M75.112 NONTRAUMATIC INCOMPLETE TEAR OF LEFT ROTATOR CUFF: Primary | ICD-10-CM

## 2023-03-27 DIAGNOSIS — G89.29 CHRONIC LEFT SHOULDER PAIN: ICD-10-CM

## 2023-03-27 PROCEDURE — 97162 PT EVAL MOD COMPLEX 30 MIN: CPT | Performed by: PHYSICAL THERAPIST

## 2023-03-27 NOTE — THERAPY EVALUATION
Outpatient Physical Therapy Ortho Initial Evaluation  [facility]     Patient Name: Sophia Neff  : 1945  MRN: [id number]  Today's Date: 3/27/2023      Visit Date: 2023     Patient seen for 1 PT sessions.  Patient reports N/A% of improvement.  Next MD appt: KANE.  Recertification: 2023.    Therapy Diagnosis: S/P L RCR/DCE/ACR/Bicep tedotomoy (DOS2023)        Patient Active Problem List   Diagnosis   • Cystocele and rectocele with incomplete uterovaginal prolapse   • Essential hypertension   • Stage 2 chronic kidney disease        Past Medical History:   Diagnosis Date   • Allergic    • Anemia    • Arthritis    • Bell's palsy    • Chronic kidney disease    • Controlled type 2 diabetes mellitus without complication, without long-term current use of insulin (HCC) 2019   • Diabetes mellitus (HCC)    • Fibromyalgia    • High cholesterol    • Hypertension    • Iron deficiency anemia    • 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 Nontraumatic incomplete tear of left rotator cuff  M75.112 726.13   2. Rotator cuff syndrome of left shoulder  M75.102 726.10   3. Chronic left shoulder pain  M25.512 719.41    G89.29 338.29          Patient History     Row Name 23 1500             History    Chief Complaint Pain;Joint stiffness;Muscle weakness  -AJ      Type of Pain Shoulder pain  -AJ      Previous treatment for THIS PROBLEM Rehabilitation;Surgery  -AJ      Surgery Date: 23  -AJ      Patient/Caregiver Goals Relieve pain;Know  what to do to help the symptoms  -AJ      Current Tobacco Use None  -AJ      Smoking Status Former Smoker, quite years ago.  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 X-ray;MRI  -AJ      Results of Clinical Tests Incomplete tear of RTC  -AJ         Pain     Pain Location Shoulder  -AJ      Pain at Present 2  -AJ      Pain at Best 1  -AJ      Pain at Worst 4  -AJ      Pain Frequency Constant/continuous  -AJ      Pain Description Aching;Discomfort;Sore  -AJ      What Performance Factors Make the Current Problem(s) WORSE? Without sling  -AJ      What Performance Factors Make the Current Problem(s) BETTER? meds  -AJ      Is your sleep disturbed? Yes  -AJ      Is medication used to assist with sleep? Yes  -AJ            User Key  (r) = Recorded By, (t) = Taken By, (c) = Cosigned By    Initials Name Provider Type    AJ Edna Wells, PT DPT Physical Therapist                 PT Ortho     Row Name 03/27/23 1500       Subjective Comments    Subjective Comments see history  -AJ       Precautions and Contraindications    Precautions PER MD PROTOCOL  -AJ       Subjective Pain    Able to rate subjective pain? yes  -AJ    Pre-Treatment Pain Level 2  -AJ    Post-Treatment Pain Level 2  -AJ       Posture/Observations    Alignment Options Forward head;Thoracic kyphosis;Rounded shoulders  -AJ    Forward Head Moderate;Increased  -AJ    Thoracic Kyphosis Moderate;Increased  -AJ    Rounded Shoulders Bilateral:;Moderate;Increased  -AJ    Observations Incision healing;Ecchymosis/bruising  -AJ    Posture/Observations Comments No distress, comes in with post op sling, abduction pillow removed. Has 2 different sets of post op instructions, one for arthroscopy able to remove  sling and other more specif not to remove slong. Also has post op pictures of the anchor used in rotator cuff repair.  -AJ       Shoulder Girdle Palpation    AC Joint Left:;Tender;Swollen  -AJ       Left Upper Ext    Lt Shoulder Abduction PROM 94°  -AJ    Lt Shoulder Flexion PROM 97°  -AJ    Lt Shoulder External Rotation PROM 0° in scapular plane  -AJ    Lt Shoulder Internal Rotation PROM 32° in scapular plane  -AJ       MMT (Manual Muscle Testing)    General MMT Comments Deferred due to post status.  -AJ       Sensation    Sensation WNL? WFL  -AJ    Light Touch Partial deficits in the LUE  -AJ    Additional Comments Still has some decrease in sensation due to block.  -AJ       Transfers    Comment, (Transfers) I with all transfers.  -AJ       Gait/Stairs (Locomotion)    Comment, (Gait/Stairs) FWB, non-antalgic gait, no assistive device, no significant deviations noted, absent L arm swing with gait.  -AJ       Hand  Strength     Strength Affected Side Bilateral  -AJ          User Key  (r) = Recorded By, (t) = Taken By, (c) = Cosigned By    Initials Name Provider Type    AJ Edna Wells, PT DPT Physical Therapist                            Therapy Education  Education Details: HEP: all exercises given today  Given: HEP, Pain management, Posture/body mechanics, Edema management, Bandaging/dressing change, Other (comment) (SLing wear; POC)  Program: New  How Provided: Verbal, Demonstration, Written  Provided to: Patient  Level of Understanding: Teach back education performed, Verbalized, Demonstrated      PT OP Goals     Row Name 03/27/23 1500          PT Short Term Goals    STG 1 I with HEP and have additions/changes by next re-certification.  -AJ     STG 2 Patient to be more aware of posture and posture correction technique.  -     STG 3 Patient to be compliant with sling wear per MD protocol.  -AJ     STG 4 PROM L Shoulder flexion/abduction >=145°.  -AJ     STG 5 PROM L shoulder IR >= 55° at 90°  of shoulder abduction.  -     STG 6 L  at the #2 setting within 10# of the contralateral side with average in 3 attempts.  -     STG 7 AAROM L Shoulder flexion/abduction >=125° in standing..  -        Long Term Goals    LTG 1 Patient to have full PROM for the L shoulder in all planes.  -     LTG 2 Patient to have full L shoulder AAROM with 3-way pulley's.  -     LTG 3 AROM L Shoulder flexion/abduction >=155°.  -     LTG 4 AROM L shoulder IR >= 55° at 90° of shoulder abduction.  -     LTG 5 AROM L shoulder ER >= 75° at 90° of shoulder abduction.  -     LTG 6 L UE >= 4+/5.  -     LTG 7 Patient able to perform 3 minutes of OH activities with no increase in pain.  -     LTG 8 I with final HEP.  -        Time Calculation    PT Goal Re-Cert Due Date 04/17/23  -           User Key  (r) = Recorded By, (t) = Taken By, (c) = Cosigned By    Initials Name Provider Type    Edna Salguero, PT DPT Physical Therapist              Barriers to Rehab: Include significant or possible arthritic/degenerative changes that have occurred within the joint, The chronicity of this issue.    Safety Issues: None noted.        PT Assessment/Plan     Row Name 03/27/23 1500          PT Assessment    Functional Limitations Limitation in home management;Limitations in community activities;Performance in leisure activities;Performance in self-care ADL  -     Impairments Impaired muscle endurance;Impaired muscle power;Impaired flexibility;Joint integrity;Joint mobility;Pain;Posture;Range of motion;Impaired muscle length;Impaired postural alignment;Muscle strength  -     Assessment Comments Patient is a 76yo female who presents to the clinic today with complaints of L shoulder decreased mobilty after surgery. patient comes in in post op sling with abduciton pillow removed. She also has an arthroscopy handout sling just for comfort. She has a more detailed handout from post op summary stating sling constant  except for showering and therapy, especially to wear while sleeping. Patient also has post op pictures of her rotator cuff repair. patient was instructed to place the abduction pillow back on the sling and instructed in proper placement for pillow and sling. Patient was also instructed in no AROM of the shoulder and elbow and resrictions for a rotator cuff repair. MD moscoso was also called for clarrificatin oon which size rotator cuff repair protocol to use. unable ot get an answer today so large protocol ws followed and will be until we hear otherwise. Patient had a lo of difficulty relaxing for PROM and pendulums but was isntructed, cued and demonstratedfor pendulums. Patient has loss of ROM, strength and function. Patient's insurance does not allow treatment to begin on the initial evaluation. Small HEP was established.  Skilled PT with address deficits listed.  -RIAZ     Rehab Potential Good  -AJ     Patient/caregiver participated in establishment of treatment plan and goals Yes  -AJ     Patient would benefit from skilled therapy intervention Yes  -AJ        PT Plan    PT Frequency 2x/week  -AJ     Predicted Duration of Therapy Intervention (PT) 16-20 visits  -RIAZ     Planned CPT's? PT EVAL MOD COMPLELITY: 26650;PT RE-EVAL: 48797;PT THER PROC EA 15 MIN: 18519;PT THER ACT EA 15 MIN: 97196;PT MANUAL THERAPY EA 15 MIN: 64271;PT NEUROMUSC RE-EDUCATION EA 15 MIN: 52727;PT SELF CARE/HOME MGMT/TRAIN EA 15: 87720;PT HOT OR COLD PACK TREAT MCARE;PT ELECTRICAL STIM UNATTEND: ;PT THER SUPP EA 15 MIN  -     PT Plan Comments Awaiting op note from MD office on which size tear to perform RCR protocol. Will use large tear protocol until clarrification from MD.  -RIAZ           User Key  (r) = Recorded By, (t) = Taken By, (c) = Cosigned By    Initials Name Provider Type    Edna Salguero, PT DPT Physical Therapist            Other therapeutic activities and/or exercises will be prescribed depending on the patient's  "progress or lack thereof.       Modalities     Row Name 03/27/23 1500             Ice    Patient denies application of Ice Yes  -AJ      Patient reports will apply ice at home to involved area Yes  -AJ            User Key  (r) = Recorded By, (t) = Taken By, (c) = Cosigned By    Initials Name Provider Type    Edna Salguero PT LIST Physical Therapist               OP Exercises     Row Name 03/27/23 1500             Subjective Comments    Subjective Comments see history  -AJ         Subjective Pain    Able to rate subjective pain? yes  -AJ      Pre-Treatment Pain Level 2  -AJ      Post-Treatment Pain Level 2  -AJ         Exercise 1    Exercise Name 1 Manual  -AJ         Exercise 2    Exercise Name 2 PROM L wlbow flex/ext  -AJ      Sets 2 1  -AJ      Reps 2 10 each  -AJ         Exercise 3    Exercise Name 3 AROM wrist flex/ext  -AJ      Sets 3 1  -AJ      Reps 3 10 each  -AJ         Exercise 4    Exercise Name 4 Pendulums F/R, S/S, circles: cw/ccw  -AJ      Sets 4 1  -AJ      Reps 4 ~10-15 each  -AJ         Exercise 5    Exercise Name 5 Towel squeeze  -AJ      Sets 5 1  -AJ      Reps 5 10  -AJ      Time 5 5\" hold  -AJ            User Key  (r) = Recorded By, (t) = Taken By, (c) = Cosigned By    Initials Name Provider Type    Edna Salguero, PT DPT Physical Therapist              Manual Rx (last 36 hours)     Manual Treatments     Row Name 03/27/23 1500             Manual Rx 1    Manual Rx 1 Location L Shoulder  -AJ      Manual Rx 1 Type PROM with occilations  -AJ            User Key  (r) = Recorded By, (t) = Taken By, (c) = Cosigned By    Initials Name Provider Type    Edna Salguero, PT DPT Physical Therapist                All therapeutic interventions performed today were to address current functional limitations and/or deficits in addressing all physical therapy goals.               Outcome Measure Options: Quick DASH  Quick DASH  Open a tight or new jar.: Mild Difficulty  Do heavy " household chores (e.g., wash walls, wash floors): Unable  Carry a shopping bag or briefcase: No Difficulty  Wash your back: Unable  Use a knife to cut food: Mild Difficulty  Recreational activities in which you take some force or impact through your arm, should or hand (e.g. golf, hammering, tennis, etc.): Unable  During the past week, to what extent has your arm, shoulder, or hand problem interfered with your normal social activites with family, friends, neighbors or groups?: Quite a bit  During the past week, were you limited in your work or other regular daily activities as a result of your arm, shoulder or hand problem?: Very limited  Arm, Shoulder, or hand pain: Mild  Tingling (pins and needles) in your arm, shoulder, or hand: Mild  During the past week, how much difficulty have you had sleeping because of the pain in your arm, shoulder or hand?: Moderate Difficiculty  Number of Questions Answered: 11  Quick DASH Score: 54.55         Time Calculation:     Start Time: 1546  Stop Time: 1639  Time Calculation (min): 53 min     Therapy Charges for Today     Code Description Service Date Service Provider Modifiers Qty    23721782762  PT THER SUPP EA 15 MIN 3/27/2023 Edna Wells, PT DPT GP 1    91926584610 HC PT EVAL MOD COMPLEXITY 4 3/27/2023 Edna Wells, PT DPT GP 1          PT G-Codes  Outcome Measure Options: Quick DASH  Quick DASH Score: 54.55       This document has been electronically signed by Edna Wells PT DPT, [facility] on March 27, 2023 17:26 CDT

## 2023-03-29 ENCOUNTER — HOSPITAL ENCOUNTER (OUTPATIENT)
Dept: PHYSICAL THERAPY | Facility: HOSPITAL | Age: 78
Setting detail: THERAPIES SERIES
Discharge: HOME | End: 2023-03-29
Payer: MEDICARE

## 2023-03-29 DIAGNOSIS — M75.102 ROTATOR CUFF SYNDROME OF LEFT SHOULDER: ICD-10-CM

## 2023-03-29 DIAGNOSIS — G89.29 CHRONIC LEFT SHOULDER PAIN: ICD-10-CM

## 2023-03-29 DIAGNOSIS — M75.112 NONTRAUMATIC INCOMPLETE TEAR OF LEFT ROTATOR CUFF: Primary | ICD-10-CM

## 2023-03-29 DIAGNOSIS — M19.012 PRIMARY OSTEOARTHRITIS OF LEFT SHOULDER: ICD-10-CM

## 2023-03-29 DIAGNOSIS — M25.512 CHRONIC LEFT SHOULDER PAIN: ICD-10-CM

## 2023-03-29 PROCEDURE — 97140 MANUAL THERAPY 1/> REGIONS: CPT

## 2023-03-29 PROCEDURE — 97110 THERAPEUTIC EXERCISES: CPT

## 2023-03-29 NOTE — THERAPY TREATMENT NOTE
Outpatient Physical Therapy Ortho Treatment Note  [facility]     Patient Name: Sophia Neff  : 1945  MRN: [id number]  Today's Date: 3/29/2023     Pt seen for 2 PT sessions  Reported Improvement:  N/A %  MD Visit: TBD  Recheck Date: 2023    Therapy Diagnosis:  S/P L RCR/DCE/ACR/Bicep tedotomoy (DOS2023)        Visit Date: 2023    Visit Dx:    ICD-10-CM ICD-9-CM   1. Nontraumatic incomplete tear of left rotator cuff  M75.112 726.13   2. Rotator cuff syndrome of left shoulder  M75.102 726.10   3. Chronic left shoulder pain  M25.512 719.41    G89.29 338.29   4. 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 yes  -JW    Pre-Treatment Pain Level 3  in the arm more than shoulder  -JW          User Key  (r) = Recorded  By, (t) = Taken By, (c) = Cosigned By    Initials Name Provider Type    Shital Cadet PTA Physical Therapist Assistant                             PT Assessment/Plan     Row Name 03/29/23 1300          PT Assessment    Assessment Comments Per primary PT; recieved op note and protocol.  Will follow large tear protocol.  Pt complains of pain down the arm more than in the shoulder at start of tx session.  States she is not sure if she has the sling and pillow on correctly. Pt requires mod verbal cues and reinforcement for proper form with pendulums.  States she does not like being cold and struggles to ice her shoulder at home.  Education on proper fit and wear of sling/pillow.  -JW        PT Plan    PT Frequency 2x/week  -JW     PT Plan Comments Continue per large tear protocol.  -JW           User Key  (r) = Recorded By, (t) = Taken By, (c) = Cosigned By    Initials Name Provider Type    Shital Cadet PTA Physical Therapist Assistant                 Modalities     Row Name 03/29/23 1300             Ice    Ice Applied Yes  -JW      Location L shoulder  -JW      PT Ice Rx Minutes 10  -JW      Ice S/P Rx Yes  -JW            User Key  (r) = Recorded By, (t) = Taken By, (c) = Cosigned By    Initials Name Provider Type    Shital Cadet PTA Physical Therapist Assistant               OP Exercises     Row Name 03/29/23 1300             Subjective Pain    Able to rate subjective pain? yes  -JW      Pre-Treatment Pain Level 3  in the arm more than shoulder  -JW         Exercise 1    Exercise Name 1 Pendulums F/R  -JW      Time 1 2 min  -JW         Exercise 2    Exercise Name 2 AROM Wrist flex/ext  -JW      Reps 2 20  -JW         Exercise 3    Exercise Name 3 Pendulms - lateral  -JW      Time 3 2 min  -JW         Exercise 4    Exercise Name 4 Pendulums  -JW      Time 4 2 min  -JW      Additional Comments CW/CCW  -JW         Exercise 5    Exercise Name 5 AROM Sup/pronation  -JW      Reps 5 20  -JW         Exercise  6    Exercise Name 6 Ball Squeeze  -      Time 6 3 min with 3 sec hold  -      Additional Comments seated  -         Exercise 7    Exercise Name 7 PROM  -      Time 7 10  -         Exercise 8    Exercise Name 8 Ice with elevation  -      Time 8 10 min  -            User Key  (r) = Recorded By, (t) = Taken By, (c) = Cosigned By    Initials Name Provider Type    HARVINDER Shital Booker PTA Physical Therapist Assistant                         Manual Rx (last 36 hours)     Manual Treatments     Row Name 03/29/23 1300             Manual Rx 1    Manual Rx 1 Location L Shoulder  -      Manual Rx 1 Type PROM with occilations  -      Manual Rx 1 Duration 10 min  -            User Key  (r) = Recorded By, (t) = Taken By, (c) = Cosigned By    Initials Name Provider Type    HARVINDER Shital Booker PTA Physical Therapist Assistant                 PT OP Goals     Row Name 03/29/23 1300          PT Short Term Goals    STG 1 I with HEP and have additions/changes by next re-certification.  -     STG 2 Patient to be more aware of posture and posture correction technique.  -     STG 3 Patient to be compliant with sling wear per MD protocol.  -     STG 4 PROM L Shoulder flexion/abduction >=145°.  -     STG 5 PROM L shoulder IR >= 55° at 90° of shoulder abduction.  -     STG 6 L  at the #2 setting within 10# of the contralateral side with average in 3 attempts.  -     STG 7 AAROM L Shoulder flexion/abduction >=125° in standing..  -        Long Term Goals    LTG 1 Patient to have full PROM for the L shoulder in all planes.  -     LTG 2 Patient to have full L shoulder AAROM with 3-way pulley's.  -     LTG 3 AROM L Shoulder flexion/abduction >=155°.  -     LTG 4 AROM L shoulder IR >= 55° at 90° of shoulder abduction.  -     LTG 5 AROM L shoulder ER >= 75° at 90° of shoulder abduction.  -     LTG 6 L UE >= 4+/5.  -     LT 7 Patient able to perform 3 minutes of OH activities with no increase in pain.   -HARVINDER     LTG 8 I with final HEP.  -HARVINDER        Time Calculation    PT Goal Re-Cert Due Date 04/17/23  -HARVINDER           User Key  (r) = Recorded By, (t) = Taken By, (c) = Cosigned By    Initials Name Provider Type    Shital Cadet PTA Physical Therapist Assistant                Therapy Education  Education Details: ball squeeze, pendulums  Given: HEP, Symptoms/condition management, Pain management  Program: Reinforced, Progressed  How Provided: Verbal, Demonstration  Provided to: Patient  Level of Understanding: Verbalized              Time Calculation:   Start Time: 1300  Stop Time: 1355  Time Calculation (min): 55 min  PT Non-Billable Time (min): 10 min  Total Timed Code Minutes- PT: 45 minute(s)  Untimed Charges  PT Ice Rx Minutes: 10  Total Minutes  Untimed Charges Total Minutes: 10   Total Minutes: 10  Therapy Charges for Today     Code Description Service Date Service Provider Modifiers Qty    48336331791 HC PT THER PROC EA 15 MIN 3/29/2023 Shital Booker PTA GP, CQ 2    27221617031 HC PT MANUAL THERAPY EA 15 MIN 3/29/2023 Shital Booker PTA GP, CQ 1    20860047355 HC PT THER SUPP EA 15 MIN 3/29/2023 Shital Booker PTA GP, CQ 1                    Shital Booker PTA  3/29/2023

## 2023-04-05 ENCOUNTER — HOSPITAL ENCOUNTER (OUTPATIENT)
Dept: PHYSICAL THERAPY | Facility: HOSPITAL | Age: 78
Setting detail: THERAPIES SERIES
Discharge: HOME | End: 2023-04-05
Payer: MEDICARE

## 2023-04-05 ENCOUNTER — LAB (OUTPATIENT)
Dept: LAB | Facility: HOSPITAL | Age: 78
End: 2023-04-05
Payer: MEDICARE

## 2023-04-05 DIAGNOSIS — G89.29 CHRONIC LEFT SHOULDER PAIN: ICD-10-CM

## 2023-04-05 DIAGNOSIS — M19.012 PRIMARY OSTEOARTHRITIS OF LEFT SHOULDER: ICD-10-CM

## 2023-04-05 DIAGNOSIS — M25.512 CHRONIC LEFT SHOULDER PAIN: ICD-10-CM

## 2023-04-05 DIAGNOSIS — E11.22 TYPE 2 DIABETES MELLITUS WITH STAGE 3A CHRONIC KIDNEY DISEASE, WITHOUT LONG-TERM CURRENT USE OF INSULIN: ICD-10-CM

## 2023-04-05 DIAGNOSIS — N18.31 TYPE 2 DIABETES MELLITUS WITH STAGE 3A CHRONIC KIDNEY DISEASE, WITHOUT LONG-TERM CURRENT USE OF INSULIN: ICD-10-CM

## 2023-04-05 DIAGNOSIS — M75.102 ROTATOR CUFF SYNDROME OF LEFT SHOULDER: ICD-10-CM

## 2023-04-05 DIAGNOSIS — M75.112 NONTRAUMATIC INCOMPLETE TEAR OF LEFT ROTATOR CUFF: Primary | ICD-10-CM

## 2023-04-05 DIAGNOSIS — N18.32 CHRONIC KIDNEY DISEASE (CKD) STAGE G3B/A3, MODERATELY DECREASED GLOMERULAR FILTRATION RATE (GFR) BETWEEN 30-44 ML/MIN/1.73 SQUARE METER AND ALBUMINURIA CREATININE RATIO GREATER THAN 300 MG/G (C*: ICD-10-CM

## 2023-04-05 LAB
ALBUMIN SERPL-MCNC: 3.9 G/DL (ref 3.5–5.2)
ALBUMIN/GLOB SERPL: 1.4 G/DL
ALP SERPL-CCNC: 116 U/L (ref 39–117)
ALT SERPL W P-5'-P-CCNC: 13 U/L (ref 1–33)
ANION GAP SERPL CALCULATED.3IONS-SCNC: 8 MMOL/L (ref 5–15)
AST SERPL-CCNC: 17 U/L (ref 1–32)
BASOPHILS # BLD AUTO: 0.04 10*3/MM3 (ref 0–0.2)
BASOPHILS NFR BLD AUTO: 0.8 % (ref 0–1.5)
BILIRUB SERPL-MCNC: 0.3 MG/DL (ref 0–1.2)
BUN SERPL-MCNC: 20 MG/DL (ref 8–23)
BUN/CREAT SERPL: 20 (ref 7–25)
CALCIUM SPEC-SCNC: 9.9 MG/DL (ref 8.6–10.5)
CHLORIDE SERPL-SCNC: 102 MMOL/L (ref 98–107)
CHOLEST SERPL-MCNC: 141 MG/DL (ref 0–200)
CO2 SERPL-SCNC: 28 MMOL/L (ref 22–29)
CREAT SERPL-MCNC: 1 MG/DL (ref 0.57–1)
DEPRECATED RDW RBC AUTO: 43.6 FL (ref 37–54)
EGFRCR SERPLBLD CKD-EPI 2021: 57.8 ML/MIN/1.73
EOSINOPHIL # BLD AUTO: 0.06 10*3/MM3 (ref 0–0.4)
EOSINOPHIL NFR BLD AUTO: 1.3 % (ref 0.3–6.2)
ERYTHROCYTE [DISTWIDTH] IN BLOOD BY AUTOMATED COUNT: 13.7 % (ref 12.3–15.4)
GLOBULIN UR ELPH-MCNC: 2.8 GM/DL
GLUCOSE SERPL-MCNC: 107 MG/DL (ref 65–99)
HBA1C MFR BLD: 6.1 % (ref 4.8–5.6)
HCT VFR BLD AUTO: 30.8 % (ref 34–46.6)
HDLC SERPL-MCNC: 55 MG/DL (ref 40–60)
HGB BLD-MCNC: 9.4 G/DL (ref 12–15.9)
IMM GRANULOCYTES # BLD AUTO: 0.01 10*3/MM3 (ref 0–0.05)
IMM GRANULOCYTES NFR BLD AUTO: 0.2 % (ref 0–0.5)
LDLC SERPL CALC-MCNC: 73 MG/DL (ref 0–100)
LDLC/HDLC SERPL: 1.33 {RATIO}
LYMPHOCYTES # BLD AUTO: 1.05 10*3/MM3 (ref 0.7–3.1)
LYMPHOCYTES NFR BLD AUTO: 22.1 % (ref 19.6–45.3)
MCH RBC QN AUTO: 26.5 PG (ref 26.6–33)
MCHC RBC AUTO-ENTMCNC: 30.5 G/DL (ref 31.5–35.7)
MCV RBC AUTO: 86.8 FL (ref 79–97)
MONOCYTES # BLD AUTO: 0.55 10*3/MM3 (ref 0.1–0.9)
MONOCYTES NFR BLD AUTO: 11.6 % (ref 5–12)
NEUTROPHILS NFR BLD AUTO: 3.05 10*3/MM3 (ref 1.7–7)
NEUTROPHILS NFR BLD AUTO: 64 % (ref 42.7–76)
NRBC BLD AUTO-RTO: 0 /100 WBC (ref 0–0.2)
PLATELET # BLD AUTO: 252 10*3/MM3 (ref 140–450)
PMV BLD AUTO: 10.8 FL (ref 6–12)
POTASSIUM SERPL-SCNC: 4.4 MMOL/L (ref 3.5–5.2)
PROT SERPL-MCNC: 6.7 G/DL (ref 6–8.5)
RBC # BLD AUTO: 3.55 10*6/MM3 (ref 3.77–5.28)
SODIUM SERPL-SCNC: 138 MMOL/L (ref 136–145)
TRIGL SERPL-MCNC: 64 MG/DL (ref 0–150)
TSH SERPL DL<=0.05 MIU/L-ACNC: 0.99 UIU/ML (ref 0.27–4.2)
VIT B12 BLD-MCNC: 683 PG/ML (ref 211–946)
VLDLC SERPL-MCNC: 13 MG/DL (ref 5–40)
WBC NRBC COR # BLD: 4.76 10*3/MM3 (ref 3.4–10.8)

## 2023-04-05 PROCEDURE — 97110 THERAPEUTIC EXERCISES: CPT

## 2023-04-05 PROCEDURE — 80053 COMPREHEN METABOLIC PANEL: CPT

## 2023-04-05 PROCEDURE — 84443 ASSAY THYROID STIM HORMONE: CPT

## 2023-04-05 PROCEDURE — 82607 VITAMIN B-12: CPT

## 2023-04-05 PROCEDURE — 97140 MANUAL THERAPY 1/> REGIONS: CPT

## 2023-04-05 PROCEDURE — 83036 HEMOGLOBIN GLYCOSYLATED A1C: CPT

## 2023-04-05 PROCEDURE — 80061 LIPID PANEL: CPT

## 2023-04-05 PROCEDURE — 85025 COMPLETE CBC W/AUTO DIFF WBC: CPT

## 2023-04-05 NOTE — THERAPY TREATMENT NOTE
Outpatient Physical Therapy Ortho Treatment Note  [facility]     Patient Name: Sophia Neff  : 1945  MRN: [id number]  Today's Date: 2023     Pt seen for 3 PT sessions  Reported Improvement:  N/A %  MD Visit: 2023  Recheck Date: 2023    Therapy Diagnosis:  S/P L RCR/DCE/ACR/Bicep tedotomoy (DOS2023)        Visit Date: 2023    Visit Dx:    ICD-10-CM ICD-9-CM   1. Nontraumatic incomplete tear of left rotator cuff  M75.112 726.13   2. Rotator cuff syndrome of left shoulder  M75.102 726.10   3. Chronic left shoulder pain  M25.512 719.41    G89.29 338.29   4. 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 yes  -JW    Pre-Treatment Pain Level 3  -JW          User Key  (r) = Recorded By, (t) = Taken By, (c) = Cosigned By     Initials Name Provider Type    Shital Cadet PTA Physical Therapist Assistant                             PT Assessment/Plan     Row Name 04/05/23 1200          PT Assessment    Assessment Comments Pt had f/u with orthopedic surgeon earlier htis date.  Sutures removed.  Instructed to remove abduction pillow and to continue to wear sling at all times with excpetion of sitting at home resting/watching tv etc.  Pt verbalizes understanding.  Improved performance of PROM pendulums this date requiring less verbal or tactile cues for proper technique.  Pt requires mod cues for PROM into flexion with oscillations.  PROM improved from last visit . Ice post tx session for pain relief.  -JW        PT Plan    PT Frequency 2x/week  -JW     PT Plan Comments Next visit initiate table slides.  -           User Key  (r) = Recorded By, (t) = Taken By, (c) = Cosigned By    Initials Name Provider Type    Shital Cadet PTA Physical Therapist Assistant                   OP Exercises     Row Name 04/05/23 1100             Subjective Comments    Subjective Comments Arm is sore, doctor told me I could take the pillow off my sling  -         Subjective Pain    Able to rate subjective pain? yes  -JW      Pre-Treatment Pain Level 3  -JW      Post-Treatment Pain Level 0  -JW         Exercise 1    Exercise Name 1 Pendulums F/R  -JW      Time 1 3 min  -JW         Exercise 2    Exercise Name 2 Elbow Flexion  -JW      Reps 2 20  -JW         Exercise 3    Exercise Name 3 Pendulums - lateral  -JW      Time 3 3 min  -JW         Exercise 4    Exercise Name 4 Wrist AROM flexion/extension  -JW      Reps 4 20  -JW         Exercise 5    Exercise Name 5 Pendulums CW/CCW  -JW      Time 5 3 min ea direction  -JW         Exercise 6    Exercise Name 6 Sup / Pro forearm  -JW      Reps 6 20  -JW         Exercise 7    Exercise Name 7 PROM  -JW      Time 7 8 min  -JW         Exercise 8    Exercise Name 8 Ice with elevation  -            User Key   (r) = Recorded By, (t) = Taken By, (c) = Cosigned By    Initials Name Provider Type    HARVINDER Booker, MANUEL Isaacs Physical Therapist Assistant                         Manual Rx (last 36 hours)     Manual Treatments     Row Name 04/05/23 1300             Manual Rx 1    Manual Rx 1 Location L Shoulder  -      Manual Rx 1 Type PROM with occilations  -      Manual Rx 1 Duration 10 min  -            User Key  (r) = Recorded By, (t) = Taken By, (c) = Cosigned By    Initials Name Provider Type    HARVINDER Booker, MANUEL Isaacs Physical Therapist Assistant                 PT OP Goals     Row Name 04/05/23 1200          PT Short Term Goals    STG 1 I with HEP and have additions/changes by next re-certification.  -     STG 1 Progress Ongoing  -     STG 2 Patient to be more aware of posture and posture correction technique.  -     STG 2 Progress Ongoing  -     STG 3 Patient to be compliant with sling wear per MD protocol.  -     STG 3 Progress Partially Met;Ongoing  -     STG 4 PROM L Shoulder flexion/abduction >=145°.  -     STG 4 Progress Progressing  -     STG 5 PROM L shoulder IR >= 55° at 90° of shoulder abduction.  -     STG 6 L  at the #2 setting within 10# of the contralateral side with average in 3 attempts.  -     STG 7 AAROM L Shoulder flexion/abduction >=125° in standing..  -        Long Term Goals    LTG 1 Patient to have full PROM for the L shoulder in all planes.  -     LTG 2 Patient to have full L shoulder AAROM with 3-way pulley's.  -     LTG 3 AROM L Shoulder flexion/abduction >=155°.  -     LTG 4 AROM L shoulder IR >= 55° at 90° of shoulder abduction.  -     LTG 5 AROM L shoulder ER >= 75° at 90° of shoulder abduction.  -     LTG 6 L UE >= 4+/5.  -     LTG 7 Patient able to perform 3 minutes of OH activities with no increase in pain.  -     LTG 8 I with final HEP.  -        Time Calculation    PT Goal Re-Cert Due Date 04/17/23  -           User Key  (r) = Recorded By,  (t) = Taken By, (c) = Cosigned By    Initials Name Provider Type    Shital Cadet PTA Physical Therapist Assistant                Therapy Education  Education Details: protocol restrictions  Given: HEP, Symptoms/condition management, Pain management  Program: Reinforced  How Provided: Verbal, Demonstration  Provided to: Patient  Level of Understanding: Demonstrated              Time Calculation:   Start Time: 1128  Stop Time: 1214  Time Calculation (min): 46 min  Therapy Charges for Today     Code Description Service Date Service Provider Modifiers Qty    55924198775 HC PT THER PROC EA 15 MIN 4/5/2023 Shital Booker PTA GP, CQ 2    82780433580 HC PT MANUAL THERAPY EA 15 MIN 4/5/2023 Shital Booker PTA GP, CQ 1    90721936832 HC PT THER SUPP EA 15 MIN 4/5/2023 Shital Booker PTA GP, CQ 1                    Shital Booker PTA  4/5/2023

## 2023-04-06 PROCEDURE — 82570 ASSAY OF URINE CREATININE: CPT

## 2023-04-06 PROCEDURE — 82043 UR ALBUMIN QUANTITATIVE: CPT

## 2023-04-07 ENCOUNTER — HOSPITAL ENCOUNTER (OUTPATIENT)
Dept: PHYSICAL THERAPY | Facility: HOSPITAL | Age: 78
Setting detail: THERAPIES SERIES
Discharge: HOME | End: 2023-04-07
Payer: MEDICARE

## 2023-04-07 DIAGNOSIS — M25.512 CHRONIC LEFT SHOULDER PAIN: ICD-10-CM

## 2023-04-07 DIAGNOSIS — M75.112 NONTRAUMATIC INCOMPLETE TEAR OF LEFT ROTATOR CUFF: Primary | ICD-10-CM

## 2023-04-07 DIAGNOSIS — M75.102 ROTATOR CUFF SYNDROME OF LEFT SHOULDER: ICD-10-CM

## 2023-04-07 DIAGNOSIS — M19.012 PRIMARY OSTEOARTHRITIS OF LEFT SHOULDER: ICD-10-CM

## 2023-04-07 DIAGNOSIS — G89.29 CHRONIC LEFT SHOULDER PAIN: ICD-10-CM

## 2023-04-07 PROCEDURE — 97110 THERAPEUTIC EXERCISES: CPT

## 2023-04-07 PROCEDURE — 97140 MANUAL THERAPY 1/> REGIONS: CPT

## 2023-04-07 NOTE — THERAPY TREATMENT NOTE
Outpatient Physical Therapy Ortho Treatment Note  [facility]     Patient Name: Sophia Neff  : 1945  MRN: [id number]  Today's Date: 2023      Visit Date: 2023     Pt seen for 4 PT sessions  Reported Improvement:  N/A %  MD Visit: 2023  Recheck Date: 2023     Therapy Diagnosis:  S/P L RCR/DCE/ACR/Bicep tedotomoy (DOS2023)    Visit Dx:    ICD-10-CM ICD-9-CM   1. Nontraumatic incomplete tear of left rotator cuff  M75.112 726.13   2. Rotator cuff syndrome of left shoulder  M75.102 726.10   3. Chronic left shoulder pain  M25.512 719.41    G89.29 338.29   4. 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 yes  -MH       Left Upper Ext    Lt Shoulder Abduction PROM 129°  -MH    Lt Shoulder Flexion PROM 135°  -MH    Lt  Shoulder External Rotation PROM 65° in scapular plane  -    Lt Shoulder Internal Rotation PROM 45° in scapular plane  -          User Key  (r) = Recorded By, (t) = Taken By, (c) = Cosigned By    Initials Name Provider Type     Paula Ahumada PTA Physical Therapist Assistant                             PT Assessment/Plan     Row Name 04/07/23 1100          PT Assessment    Assessment Comments patient hsa significant improvements in PROM today. continues to be guarded with motions and requiers cueing to decrease guarding. patient needs cueing to decrease active use of UE.  -        PT Plan    PT Frequency 2x/week  -     PT Plan Comments next visit continue per protocol. scap squeezes  -           User Key  (r) = Recorded By, (t) = Taken By, (c) = Cosigned By    Initials Name Provider Type    Paula Luciano PTA Physical Therapist Assistant                 Modalities     Row Name 04/07/23 1100             Ice    Ice Applied Yes  -      Location L shoulder  -      PT Ice Rx Minutes 10  -      Ice S/P Rx Yes  -            User Key  (r) = Recorded By, (t) = Taken By, (c) = Cosigned By    Initials Name Provider Type    Paula Luciano PTA Physical Therapist Assistant               OP Exercises     Row Name 04/07/23 1100             Subjective Pain    Able to rate subjective pain? yes  -      Pre-Treatment Pain Level 0  -         Exercise 1    Exercise Name 1 Pendulums S/S  -MH      Time 1 2 minutes  -         Exercise 2    Exercise Name 2 Elbow Flexion  -MH      Reps 2 20  -MH         Exercise 3    Exercise Name 3 Pendulums F/R  -MH      Time 3 2 minutes  -         Exercise 4    Exercise Name 4 Forearm Pro/Supination  -MH      Reps 4 20  -MH         Exercise 5    Exercise Name 5 Pendulums CW/CCW  -MH      Time 5 2 minutes each direction  -MH         Exercise 6    Exercise Name 6 Table Slides Flex and ABD  -MH      Time 6 3 minutes each  -MH         Exercise 7    Exercise Name 7 see manual  section  -            User Key  (r) = Recorded By, (t) = Taken By, (c) = Cosigned By    Initials Name Provider Type    MARISSA GurpreetabelPaula PTA Physical Therapist Assistant                         Manual Rx (last 36 hours)     Manual Treatments     Row Name 04/07/23 1100             Manual Rx 1    Manual Rx 1 Location L Shoulder  -      Manual Rx 1 Type PROM with occilations at end range with gentle GHJ mobes/gentle distraction  -      Manual Rx 1 Duration 10 minutes  -            User Key  (r) = Recorded By, (t) = Taken By, (c) = Cosigned By    Initials Name Provider Type    MARISSA Ahumada Paula LOU PTA Physical Therapist Assistant                 PT OP Goals     Row Name 04/07/23 1100          PT Short Term Goals    STG 1 I with HEP and have additions/changes by next re-certification.  -     STG 1 Progress Met  -     STG 2 Patient to be more aware of posture and posture correction technique.  -     STG 2 Progress Ongoing  -     STG 3 Patient to be compliant with sling wear per MD protocol.  -     STG 3 Progress Partially Met;Ongoing  -     STG 4 PROM L Shoulder flexion/abduction >=145°.  -     STG 4 Progress Progressing  -     STG 5 PROM L shoulder IR >= 55° at 90° of shoulder abduction.  -     STG 5 Progress Progressing  -     STG 6 L  at the #2 setting within 10# of the contralateral side with average in 3 attempts.  -     STG 7 AAROM L Shoulder flexion/abduction >=125° in standing..  -        Long Term Goals    LTG 1 Patient to have full PROM for the L shoulder in all planes.  -     LTG 2 Patient to have full L shoulder AAROM with 3-way pulley's.  -     LTG 3 AROM L Shoulder flexion/abduction >=155°.  -     LTG 4 AROM L shoulder IR >= 55° at 90° of shoulder abduction.  -     LTG 5 AROM L shoulder ER >= 75° at 90° of shoulder abduction.  -     LTG 6 L UE >= 4+/5.  -     LTG 7 Patient able to perform 3 minutes of OH activities with no increase in pain.  -     LTG 8 I with  final HEP.  -        Time Calculation    PT Goal Re-Cert Due Date 04/17/23  -           User Key  (r) = Recorded By, (t) = Taken By, (c) = Cosigned By    Initials Name Provider Type     Paula Ahumada PTA Physical Therapist Assistant                Therapy Education  Education Details: table slides flexion and abd next visit  Given: HEP, Symptoms/condition management, Pain management  Program: Reinforced  How Provided: Verbal, Demonstration, Written  Provided to: Patient  Level of Understanding: Teach back education performed, Verbalized, Demonstrated              Time Calculation:   Start Time: 1130  Stop Time: 1225  Time Calculation (min): 55 min  PT Non-Billable Time (min): 10 min  Total Timed Code Minutes- PT: 45 minute(s)  Untimed Charges  PT Ice Rx Minutes: 10  Total Minutes  Untimed Charges Total Minutes: 10   Total Minutes: 10  Therapy Charges for Today     Code Description Service Date Service Provider Modifiers Qty    34874501028 HC PT THER SUPP EA 15 MIN 4/7/2023 Paula Ahumada PTA GP, CQ 1    85832453710 HC PT THER PROC EA 15 MIN 4/7/2023 Paula Ahumada PTA GP, CQ 2    28396789540 HC PT MANUAL THERAPY EA 15 MIN 4/7/2023 Paula Ahumada PTA GP, CQ 1                    Paula Ahumada PTA  4/7/2023       This document has been electronically signed by Paula Ahumada PTA on April 7, 2023 12:29 CDT

## 2023-04-08 LAB
ALBUMIN UR-MCNC: 9.6 MG/DL
CREAT UR-MCNC: 30 MG/DL
MICROALBUMIN/CREAT UR: 320 MG/G

## 2023-04-10 ENCOUNTER — TELEPHONE (OUTPATIENT)
Dept: ENDOCRINOLOGY | Facility: CLINIC | Age: 78
End: 2023-04-10
Payer: MEDICARE

## 2023-04-10 ENCOUNTER — APPOINTMENT (OUTPATIENT)
Dept: PHYSICAL THERAPY | Facility: HOSPITAL | Age: 78
End: 2023-04-10
Payer: MEDICARE

## 2023-04-10 DIAGNOSIS — N18.31 TYPE 2 DIABETES MELLITUS WITH STAGE 3A CHRONIC KIDNEY DISEASE, WITHOUT LONG-TERM CURRENT USE OF INSULIN: Primary | ICD-10-CM

## 2023-04-10 DIAGNOSIS — E11.22 TYPE 2 DIABETES MELLITUS WITH STAGE 3A CHRONIC KIDNEY DISEASE, WITHOUT LONG-TERM CURRENT USE OF INSULIN: Primary | ICD-10-CM

## 2023-04-10 RX ORDER — BLOOD-GLUCOSE METER
1 KIT MISCELLANEOUS 4 TIMES DAILY
Qty: 1 EACH | Refills: 0 | Status: SHIPPED | OUTPATIENT
Start: 2023-04-10

## 2023-04-10 NOTE — TELEPHONE ENCOUNTER
Patient called and stated her glucose meter broke and she needs a new one. She has been using the [employer]estyle Accu check meter and strips. Please send to [facility] on [address].    Phone     Thank you

## 2023-04-12 ENCOUNTER — HOSPITAL ENCOUNTER (OUTPATIENT)
Dept: PHYSICAL THERAPY | Facility: HOSPITAL | Age: 78
Setting detail: THERAPIES SERIES
Discharge: HOME | End: 2023-04-12
Payer: MEDICARE

## 2023-04-12 ENCOUNTER — TRANSCRIBE ORDERS (OUTPATIENT)
Dept: PHYSICAL THERAPY | Facility: HOSPITAL | Age: 78
End: 2023-04-12
Payer: MEDICARE

## 2023-04-12 DIAGNOSIS — M54.16 LUMBAR RADICULOPATHY: Primary | ICD-10-CM

## 2023-04-12 DIAGNOSIS — M19.012 PRIMARY OSTEOARTHRITIS OF LEFT SHOULDER: ICD-10-CM

## 2023-04-12 DIAGNOSIS — M75.112 NONTRAUMATIC INCOMPLETE TEAR OF LEFT ROTATOR CUFF: Primary | ICD-10-CM

## 2023-04-12 DIAGNOSIS — M75.102 ROTATOR CUFF SYNDROME OF LEFT SHOULDER: ICD-10-CM

## 2023-04-12 DIAGNOSIS — M25.512 CHRONIC LEFT SHOULDER PAIN: ICD-10-CM

## 2023-04-12 DIAGNOSIS — G89.29 CHRONIC LEFT SHOULDER PAIN: ICD-10-CM

## 2023-04-12 PROCEDURE — 97110 THERAPEUTIC EXERCISES: CPT

## 2023-04-12 PROCEDURE — 97140 MANUAL THERAPY 1/> REGIONS: CPT

## 2023-04-12 NOTE — THERAPY TREATMENT NOTE
Outpatient Physical Therapy Ortho Treatment Note  [facility]     Patient Name: Sophia Neff  : 1945  MRN: [id number]  Today's Date: 2023     Pt seen for 5 PT sessions  Reported Improvement:  N/A %  MD Visit: 2023  Recheck Date: 2023    Therapy Diagnosis:  S/P L RCR/DCE/ACR/Bicep tedotomoy (DOS2023)        Visit Date: 2023    Visit Dx:    ICD-10-CM ICD-9-CM   1. Nontraumatic incomplete tear of left rotator cuff  M75.112 726.13   2. Rotator cuff syndrome of left shoulder  M75.102 726.10   3. Chronic left shoulder pain  M25.512 719.41    G89.29 338.29   4. 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  States she has been hardly able to walk due to pain in LLE.  Saw MD and was informed of nerve issues causing pain from low back.  Was  "referred to PT for LBP however pt informed PT can only treat one unrelated body part at time and the L shoulder takes presedence to so surgical nature.  Pt verbalizes frustration with pain.  Tx modified due to pain in LE's.  Pt has very poor tolerance to minimal treatment this date.  Ice to go.  Reports increased pain in \"arm\" not shoulder and her leg post tx.  Pt appears to fall asleep during table slides and demonstrates facial grimacing and verbal outburst of pain.  -JW        PT Plan    PT Frequency 2x/week  -JW     PT Plan Comments Continue per  MD protocol as tolerated.  -           User Key  (r) = Recorded By, (t) = Taken By, (c) = Cosigned By    Initials Name Provider Type    Shital Cadet PTA Physical Therapist Assistant                   OP Exercises     Row Name 04/12/23 1400             Subjective Comments    Subjective Comments Not sure how much therapy we can do.  My leg is hurting too much.  -         Subjective Pain    Able to rate subjective pain? yes  -JW      Pre-Treatment Pain Level 3  -JW      Post-Treatment Pain Level 3  -JW         Exercise 1    Exercise Name 1 B UT st  -JW      Reps 1 2  -JW      Time 1 30  -JW         Exercise 2    Exercise Name 2 Elbow Flexion  -JW      Reps 2 20  -JW         Exercise 3    Exercise Name 3 Forearm Sup/Pro  -JW      Time 3 20  -JW         Exercise 4    Exercise Name 4 Table Slides Fwd Flex  -JW      Reps 4 15  -JW         Exercise 5    Exercise Name 5 Table Slides - Scaption  -JW      Reps 5 10  -JW         Exercise 6    Exercise Name 6 PROM  -JW      Time 6 10 min  -JW            User Key  (r) = Recorded By, (t) = Taken By, (c) = Cosigned By    Initials Name Provider Type    Shitla Cadet PTA Physical Therapist Assistant                              PT OP Goals     Row Name 04/12/23 1400          PT Short Term Goals    STG 1 I with HEP and have additions/changes by next re-certification.  -JW     STG 1 Progress Met  -     STG 2 Patient to be " more aware of posture and posture correction technique.  -     STG 2 Progress Ongoing  -     STG 3 Patient to be compliant with sling wear per MD protocol.  -     STG 3 Progress Partially Met;Ongoing  -     STG 4 PROM L Shoulder flexion/abduction >=145°.  -     STG 4 Progress Progressing  -     STG 5 PROM L shoulder IR >= 55° at 90° of shoulder abduction.  -     STG 5 Progress Progressing  -     STG 6 L  at the #2 setting within 10# of the contralateral side with average in 3 attempts.  -     STG 7 AAROM L Shoulder flexion/abduction >=125° in standing..  -        Long Term Goals    LTG 1 Patient to have full PROM for the L shoulder in all planes.  -     LTG 2 Patient to have full L shoulder AAROM with 3-way pulley's.  -     LTG 3 AROM L Shoulder flexion/abduction >=155°.  -     LTG 4 AROM L shoulder IR >= 55° at 90° of shoulder abduction.  -     LTG 5 AROM L shoulder ER >= 75° at 90° of shoulder abduction.  -     LTG 6 L UE >= 4+/5.  -     LTG 7 Patient able to perform 3 minutes of OH activities with no increase in pain.  -     LTG 8 I with final HEP.  -        Time Calculation    PT Goal Re-Cert Due Date 04/17/23  -           User Key  (r) = Recorded By, (t) = Taken By, (c) = Cosigned By    Initials Name Provider Type    Shital Cadet PTA Physical Therapist Assistant                               Time Calculation:   Start Time: 1430  Stop Time: 1516  Time Calculation (min): 46 min  Therapy Charges for Today     Code Description Service Date Service Provider Modifiers Qty    61961765748 HC PT THER PROC EA 15 MIN 4/12/2023 Shital Booker PTA GP, CQ 2    50895960970 HC PT MANUAL THERAPY EA 15 MIN 4/12/2023 Shital Booker PTA GP, CQ 1    97472747715 HC PT THER SUPP EA 15 MIN 4/12/2023 Shital Booker PTA GP, CQ 1                    Shital Booker PTA  4/12/2023

## 2023-04-13 ENCOUNTER — TELEMEDICINE (OUTPATIENT)
Dept: ENDOCRINOLOGY | Facility: CLINIC | Age: 78
End: 2023-04-13
Payer: MEDICARE

## 2023-04-13 DIAGNOSIS — E55.9 VITAMIN D DEFICIENCY: ICD-10-CM

## 2023-04-13 DIAGNOSIS — E11.22 TYPE 2 DIABETES MELLITUS WITH STAGE 3A CHRONIC KIDNEY DISEASE, WITHOUT LONG-TERM CURRENT USE OF INSULIN: Primary | ICD-10-CM

## 2023-04-13 DIAGNOSIS — N18.32 CHRONIC KIDNEY DISEASE (CKD) STAGE G3B/A3, MODERATELY DECREASED GLOMERULAR FILTRATION RATE (GFR) BETWEEN 30-44 ML/MIN/1.73 SQUARE METER AND ALBUMINURIA CREATININE RATIO GREATER THAN 300 MG/G (C*: ICD-10-CM

## 2023-04-13 DIAGNOSIS — E11.59 HYPERTENSION ASSOCIATED WITH DIABETES: ICD-10-CM

## 2023-04-13 DIAGNOSIS — E10.69 MIXED DIABETIC HYPERLIPIDEMIA ASSOCIATED WITH TYPE 1 DIABETES MELLITUS: ICD-10-CM

## 2023-04-13 DIAGNOSIS — I15.2 HYPERTENSION ASSOCIATED WITH DIABETES: ICD-10-CM

## 2023-04-13 DIAGNOSIS — E78.2 MIXED DIABETIC HYPERLIPIDEMIA ASSOCIATED WITH TYPE 1 DIABETES MELLITUS: ICD-10-CM

## 2023-04-13 DIAGNOSIS — N18.31 TYPE 2 DIABETES MELLITUS WITH STAGE 3A CHRONIC KIDNEY DISEASE, WITHOUT LONG-TERM CURRENT USE OF INSULIN: Primary | ICD-10-CM

## 2023-04-13 PROCEDURE — 99214 OFFICE O/P EST MOD 30 MIN: CPT | Performed by: INTERNAL MEDICINE

## 2023-04-13 NOTE — PROGRESS NOTES
Sophia Neff is a 77 y.o. female who presents for  evaluation of   Diabetes                                        Mode of Visit: Video  Location of patient: Home  You have chosen to receive care through a telehealth visit.  Does the patient consent to use a video/audio connection for your medical care today? Yes  The visit included audio and video interaction. No technical issues occurred during this visit        HPI     77 yo female with T2DM for more than 10 years complicated by stage 3 CKD  Diabetes well controlled    PE    There were no vitals taken for this visit.  AOx3  No visible goiter  Normal Respiratory Effort   No Edema    Labs    Lab Results   Component Value Date    WBC 4.76 04/05/2023    HGB 9.4 (L) 04/05/2023    HCT 30.8 (L) 04/05/2023    MCV 86.8 04/05/2023     04/05/2023     Lab Results   Component Value Date    GLUCOSE 107 (H) 04/05/2023    BUN 20 04/05/2023    CREATININE 1.00 04/05/2023    EGFRIFNONA 52 (L) 12/10/2021    BCR 20.0 04/05/2023     04/05/2023    K 4.4 04/05/2023    CO2 28.0 04/05/2023    CALCIUM 9.9 04/05/2023    ALBUMIN 3.9 04/05/2023    AST 17 04/05/2023    ALT 13 04/05/2023                   Assessment & Plan       ICD-10-CM ICD-9-CM   1. Type 2 diabetes mellitus with stage 3a chronic kidney disease, without long-term current use of insulin  E11.22 250.40    N18.31 585.3   2. Mixed diabetic hyperlipidemia associated with type 1 diabetes mellitus  E10.69 250.81    E78.2 272.2   3. Hypertension associated with diabetes  E11.59 250.80    I15.2 401.9   4. Chronic kidney disease (CKD) stage G3b/A3, moderately decreased glomerular filtration rate (GFR) between 30-44 mL/min/1.73 square meter and albuminuria creatinine ratio greater than 300 mg/g (C*  N18.32 585.3   5. Vitamin D deficiency  E55.9 268.9           T2DM    Glycemic Management:   Lab Results   Component Value Date    HGBA1C 6.10 (H) 04/05/2023    HGBA1C 5.60 12/06/2022    HGBA1C 5.70 (H) 04/26/2022          trulicity 4.5 and jardiance 10     Lipid Management    Lab Results   Component Value Date    CHOL 141 04/05/2023    TRIG 64 04/05/2023    HDL 55 04/05/2023    LDL 73 04/05/2023         Pravastatin 40 mg qhs - now taking 20 mg         Blood Pressure Management:    There were no vitals filed for this visit.  Lab Results   Component Value Date    GLUCOSE 107 (H) 04/05/2023    CALCIUM 9.9 04/05/2023     04/05/2023    K 4.4 04/05/2023    CO2 28.0 04/05/2023     04/05/2023    BUN 20 04/05/2023    CREATININE 1.00 04/05/2023    EGFRIFNONA 52 (L) 12/10/2021    BCR 20.0 04/05/2023    ANIONGAP 8.0 04/05/2023         Nl bp on hydralazine and losartan 100 mg daily     On chlorthalidone, stopped before by me when I added jardiance        Microvascular Complication Monitoring:      Eye Exam Evaluation  Within 1 year , 2022 , normal   -----------    Last Microalbumin-Proteinuria Assessment    Lab Results   Component Value Date    MALBCRERATIO 320.0 04/06/2023    MALBCRERATIO 381.4 12/06/2022    MALBCRERATIO 174.5 04/26/2022       No results found for: UTPCR     Stage 3 a / A 3 -- now A2    On kerendia 20 mg daily - not paid     On jardiance and losartan     -----------      Neuropathy, none            Weight Related:   Wt Readings from Last 3 Encounters:   11/08/21 74.8 kg (165 lb)   07/15/21 78.5 kg (173 lb)   11/16/20 77.7 kg (171 lb 3.2 oz)     There is no height or weight on file to calculate BMI.        Diet interventions: moderate (500 kCal/d) deficit diet.      Bone Health    Lab Results   Component Value Date    CALCIUM 9.9 04/05/2023    UNDF23QW 56.5 12/06/2022       Thyroid Health    Lab Results   Component Value Date    TSH 0.992 04/05/2023    TSH 1.710 12/06/2022    TSH 1.880 04/26/2022            Other Diabetes Related Aspects       Lab Results   Component Value Date    [id number] 683 04/05/2023            This document has been electronically signed by Héctor Velazquez MD on April 13,  2023 09:49 CDT

## 2023-04-18 ENCOUNTER — HOSPITAL ENCOUNTER (OUTPATIENT)
Dept: PHYSICAL THERAPY | Facility: HOSPITAL | Age: 78
Setting detail: THERAPIES SERIES
Discharge: HOME | End: 2023-04-18
Payer: MEDICARE

## 2023-04-18 DIAGNOSIS — M19.012 PRIMARY OSTEOARTHRITIS OF LEFT SHOULDER: ICD-10-CM

## 2023-04-18 DIAGNOSIS — M25.512 CHRONIC LEFT SHOULDER PAIN: ICD-10-CM

## 2023-04-18 DIAGNOSIS — M75.112 NONTRAUMATIC INCOMPLETE TEAR OF LEFT ROTATOR CUFF: Primary | ICD-10-CM

## 2023-04-18 DIAGNOSIS — G89.29 CHRONIC LEFT SHOULDER PAIN: ICD-10-CM

## 2023-04-18 DIAGNOSIS — M75.102 ROTATOR CUFF SYNDROME OF LEFT SHOULDER: ICD-10-CM

## 2023-04-18 PROCEDURE — 97140 MANUAL THERAPY 1/> REGIONS: CPT

## 2023-04-18 PROCEDURE — 97110 THERAPEUTIC EXERCISES: CPT

## 2023-04-18 NOTE — THERAPY TREATMENT NOTE
Outpatient Physical Therapy Ortho Treatment Note  [facility]     Patient Name: Sophia Neff  : 1945  MRN: [id number]  Today's Date: 2023     Pt seen for 6 PT sessions  Reported Improvement:  N/A %  MD Visit: 2023  Recheck Date: 2023    Therapy Diagnosis:  S/P L RCR/DCE/ACR/Bicep tedotomoy (DOS2023)        Visit Date: 2023    Visit Dx:    ICD-10-CM ICD-9-CM   1. Nontraumatic incomplete tear of left rotator cuff  M75.112 726.13   2. Rotator cuff syndrome of left shoulder  M75.102 726.10   3. Chronic left shoulder pain  M25.512 719.41    G89.29 338.29   4. 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  -JW       Precautions and Contraindications    Precautions Lg tear protocol  -JW       Subjective Pain    Able to rate subjective  pain? yes  -JW    Pre-Treatment Pain Level 0  -JW       Left Upper Ext    Lt Shoulder Flexion PROM 147°  -JW          User Key  (r) = Recorded By, (t) = Taken By, (c) = Cosigned By    Initials Name Provider Type    Shital Cadet PTA Physical Therapist Assistant                             PT Assessment/Plan     Row Name 04/18/23 1500          PT Assessment    Assessment Comments Pt is 3w+5d post op. Pt is wearing sling as instructed but reports she can not don/doff indpeendently and has 7 people living in her house that hlep her. Pt reports increased pain from hand placement during PROM.  Pt requires mod cues for technique with pendulums.  Improved PROM this date.  -JW        PT Plan    PT Frequency 2x/week  -JW     PT Plan Comments Next visit continue per protocol.  -JW           User Key  (r) = Recorded By, (t) = Taken By, (c) = Cosigned By    Initials Name Provider Type    Shital Cadet PTA Physical Therapist Assistant                 Modalities     Row Name 04/18/23 1400             Ice    Ice Applied Yes  -JW      Location L shoulder  -JW            User Key  (r) = Recorded By, (t) = Taken By, (c) = Cosigned By    Initials Name Provider Type    Shital Cadet PTA Physical Therapist Assistant               OP Exercises     Row Name 04/18/23 1400             Subjective Comments    Subjective Comments Arm hurt for 2-3 days after last visit from squeezing on the arm.  -JW         Subjective Pain    Able to rate subjective pain? yes  -JW      Pre-Treatment Pain Level 0  -JW      Post-Treatment Pain Level 0  -JW         Exercise 1    Exercise Name 1 Scap retractions  -JW      Reps 1 20  -JW         Exercise 2    Exercise Name 2 Pedulums S/S  -JW      Time 2 3 min  -JW         Exercise 3    Exercise Name 3 Elbow AROM  -JW      Time 3 20  -JW      Additional Comments flex/ext  -JW         Exercise 4    Exercise Name 4 Pendulums F/R  -JW      Time 4 3 min  -JW         Exercise 5    Exercise Name 5 Foarearm  Sup/Pro  -      Reps 5 20  -         Exercise 6    Exercise Name 6 Pendulums CW/CCW  -      Time 6 2 min ea direction  -JW         Exercise 7    Exercise Name 7 Table Slides Flex/Abd  -      Time 7 3 min  -         Exercise 8    Exercise Name 8 PROM  -JW         Exercise 9    Exercise Name 9 ice with elevation  -            User Key  (r) = Recorded By, (t) = Taken By, (c) = Cosigned By    Initials Name Provider Type    Shital Cadet PTA Physical Therapist Assistant                         Manual Rx (last 36 hours)     Manual Treatments     Row Name 04/18/23 1500             Manual Rx 1    Manual Rx 1 Location L Shoulder  -      Manual Rx 1 Type PROM with occilations at end range with gentle GHJ mobes/gentle distraction  -      Manual Rx 1 Duration 12 min  -            User Key  (r) = Recorded By, (t) = Taken By, (c) = Cosigned By    Initials Name Provider Type    HARVINDER Shital Booker PTA Physical Therapist Assistant                 PT OP Goals     Row Name 04/18/23 1400          PT Short Term Goals    STG 1 I with HEP and have additions/changes by next re-certification.  -     STG 1 Progress Met  -     STG 2 Patient to be more aware of posture and posture correction technique.  -     STG 2 Progress Ongoing  -     STG 3 Patient to be compliant with sling wear per MD protocol.  -     STG 3 Progress Met  -     STG 4 PROM L Shoulder flexion/abduction >=145°.  -     STG 4 Progress Met;Ongoing  -     STG 5 PROM L shoulder IR >= 55° at 90° of shoulder abduction.  -     STG 5 Progress Progressing  -     STG 6 L  at the #2 setting within 10# of the contralateral side with average in 3 attempts.  -     STG 7 AAROM L Shoulder flexion/abduction >=125° in standing..  -        Long Term Goals    LTG 1 Patient to have full PROM for the L shoulder in all planes.  -     LTG 2 Patient to have full L shoulder AAROM with 3-way pulley's.  -     LTG 3 AROM L Shoulder flexion/abduction  >=155°.  -     LTG 4 AROM L shoulder IR >= 55° at 90° of shoulder abduction.  -     LTG 5 AROM L shoulder ER >= 75° at 90° of shoulder abduction.  -     LTG 6 L UE >= 4+/5.  -     LTG 7 Patient able to perform 3 minutes of OH activities with no increase in pain.  -     LTG 8 I with final HEP.  -        Time Calculation    PT Goal Re-Cert Due Date 04/17/23  -           User Key  (r) = Recorded By, (t) = Taken By, (c) = Cosigned By    Initials Name Provider Type    Shital Cadet PTA Physical Therapist Assistant                               Time Calculation:   Start Time: 1410  Stop Time: 1514  Time Calculation (min): 64 min  PT Non-Billable Time (min): 10 min  Total Timed Code Minutes- PT: 54 minute(s)  Untimed Charges  PT Ice Rx Minutes: 10  Total Minutes  Untimed Charges Total Minutes: 10   Total Minutes: 10  Therapy Charges for Today     Code Description Service Date Service Provider Modifiers Qty    03912271365 HC PT MANUAL THERAPY EA 15 MIN 4/18/2023 Shital Booker PTA GP, CQ 1    77518547476 HC PT THER PROC EA 15 MIN 4/18/2023 Shital Booker PTA GP, CQ 3    94540720789 HC PT THER SUPP EA 15 MIN 4/18/2023 Shital Booker PTA GP, CQ 1                    Shital Booker PTA  4/18/2023

## 2023-04-20 ENCOUNTER — HOSPITAL ENCOUNTER (OUTPATIENT)
Dept: PHYSICAL THERAPY | Facility: HOSPITAL | Age: 78
Setting detail: THERAPIES SERIES
Discharge: HOME | End: 2023-04-20
Payer: MEDICARE

## 2023-04-20 DIAGNOSIS — M19.012 PRIMARY OSTEOARTHRITIS OF LEFT SHOULDER: ICD-10-CM

## 2023-04-20 DIAGNOSIS — G89.29 CHRONIC LEFT SHOULDER PAIN: ICD-10-CM

## 2023-04-20 DIAGNOSIS — M75.112 NONTRAUMATIC INCOMPLETE TEAR OF LEFT ROTATOR CUFF: Primary | ICD-10-CM

## 2023-04-20 DIAGNOSIS — M25.512 CHRONIC LEFT SHOULDER PAIN: ICD-10-CM

## 2023-04-20 DIAGNOSIS — M75.102 ROTATOR CUFF SYNDROME OF LEFT SHOULDER: ICD-10-CM

## 2023-04-20 PROCEDURE — 97140 MANUAL THERAPY 1/> REGIONS: CPT

## 2023-04-20 PROCEDURE — 97110 THERAPEUTIC EXERCISES: CPT

## 2023-04-20 NOTE — THERAPY TREATMENT NOTE
Outpatient Physical Therapy Ortho Treatment Note  [facility]     Patient Name: Sophia Neff  : 1945  MRN: [id number]  Today's Date: 2023     Pt seen for 7 PT sessions  Reported Improvement:  N/A %  MD Visit: 2023  Recheck Date: 2023    Therapy Diagnosis:  S/P L RCR/DCE/ACR/Bicep tedotomoy (DOS2023)        Visit Date: 2023    Visit Dx:    ICD-10-CM ICD-9-CM   1. Nontraumatic incomplete tear of left rotator cuff  M75.112 726.13   2. Rotator cuff syndrome of left shoulder  M75.102 726.10   3. Chronic left shoulder pain  M25.512 719.41    G89.29 338.29   4. 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  -JW       Precautions and Contraindications    Precautions Lg tear protocol  -JW       Subjective Pain    Able to rate subjective  pain? yes  -JW    Pre-Treatment Pain Level 0  -JW          User Key  (r) = Recorded By, (t) = Taken By, (c) = Cosigned By    Initials Name Provider Type    Shital Cadet PTA Physical Therapist Assistant                             PT Assessment/Plan     Row Name 04/20/23 1500          PT Assessment    Assessment Comments Pt is 4 weeks post op this date.  Complints of wrist pain (L).  Fair effort with all therex.  Appears to drift off to sleep during table lises requiring cues for attention to task. Struggle with PROm with increased pain and difficulty with guarding UE.  Ice to go post tx session.  No pain to report in shoulder  -JW        PT Plan    PT Frequency 2x/week  -JW     PT Plan Comments Cont per protocol - no changes  -JW           User Key  (r) = Recorded By, (t) = Taken By, (c) = Cosigned By    Initials Name Provider Type    Shital Cadet PTA Physical Therapist Assistant                   OP Exercises     Row Name 04/20/23 1400             Subjective Comments    Subjective Comments My wrist is really sore , mayber the way i hold it in the sling.  -JW         Subjective Pain    Able to rate subjective pain? yes  -JW      Pre-Treatment Pain Level 0  -JW      Post-Treatment Pain Level 0  -JW         Exercise 1    Exercise Name 1 Pendulums F/R  -JW      Time 1 3 min  -JW         Exercise 2    Exercise Name 2 Elbow flexion/extension  -JW      Reps 2 20  -JW         Exercise 3    Exercise Name 3 Pendulums S/S  -JW      Time 3 3 min  -JW         Exercise 4    Exercise Name 4 Forearm Sup/Pro  -JW      Reps 4 20  -JW         Exercise 5    Exercise Name 5 Pendulums CW/CCW  -JW      Time 5 3 min ea direction  -JW         Exercise 6    Exercise Name 6 Scap retraction  -JW      Reps 6 20  -JW         Exercise 7    Exercise Name 7 Table Slides - 3 way  -JW      Time 7 3 min ea direction  -JW         Exercise 8    Exercise Name 8 PROM  -JW         Exercise 9    Exercise Name 9 Ice to go  -JW            User  Key  (r) = Recorded By, (t) = Taken By, (c) = Cosigned By    Initials Name Provider Type    HARVINDER Shital Booker PTA Physical Therapist Assistant                              PT OP Goals     Row Name 04/20/23 1400          PT Short Term Goals    STG 1 I with HEP and have additions/changes by next re-certification.  -     STG 1 Progress Met  -     STG 2 Patient to be more aware of posture and posture correction technique.  -     STG 2 Progress Ongoing  -     STG 3 Patient to be compliant with sling wear per MD protocol.  -     STG 3 Progress Met  -     STG 4 PROM L Shoulder flexion/abduction >=145°.  -     STG 4 Progress Met;Ongoing  -     STG 5 PROM L shoulder IR >= 55° at 90° of shoulder abduction.  -     STG 5 Progress Progressing  -     STG 6 L  at the #2 setting within 10# of the contralateral side with average in 3 attempts.  -     STG 7 AAROM L Shoulder flexion/abduction >=125° in standing..  -        Long Term Goals    LTG 1 Patient to have full PROM for the L shoulder in all planes.  -     LTG 2 Patient to have full L shoulder AAROM with 3-way pulley's.  -     LTG 3 AROM L Shoulder flexion/abduction >=155°.  -     LTG 4 AROM L shoulder IR >= 55° at 90° of shoulder abduction.  -     LTG 5 AROM L shoulder ER >= 75° at 90° of shoulder abduction.  -     LTG 6 L UE >= 4+/5.  -     LTG 7 Patient able to perform 3 minutes of OH activities with no increase in pain.  -     LTG 8 I with final HEP.  -        Time Calculation    PT Goal Re-Cert Due Date 04/17/23  -           User Key  (r) = Recorded By, (t) = Taken By, (c) = Cosigned By    Initials Name Provider Type    HARVINDER Shital Booker PTA Physical Therapist Assistant                               Time Calculation:   Start Time: 1430  Stop Time: 1520  Time Calculation (min): 50 min  Therapy Charges for Today     Code Description Service Date Service Provider Modifiers Qty    97635936596  PT THER PROC EA 15 MIN 4/20/2023  Shital Booker, MANUEL GP, CQ 2    22402109403  PT MANUAL THERAPY EA 15 MIN 4/20/2023 Shital Booker PTA GP, CQ 1    [id number]  PT THER SUPP EA 15 MIN 4/20/2023 Shital Booker PTA GP, CQ 1                    Shital Booker PTA  4/20/2023

## 2023-04-25 ENCOUNTER — HOSPITAL ENCOUNTER (OUTPATIENT)
Dept: PHYSICAL THERAPY | Facility: HOSPITAL | Age: 78
Setting detail: THERAPIES SERIES
Discharge: HOME | End: 2023-04-25
Payer: MEDICARE

## 2023-04-25 DIAGNOSIS — M75.102 ROTATOR CUFF SYNDROME OF LEFT SHOULDER: ICD-10-CM

## 2023-04-25 DIAGNOSIS — G89.29 CHRONIC LEFT SHOULDER PAIN: ICD-10-CM

## 2023-04-25 DIAGNOSIS — M25.512 CHRONIC LEFT SHOULDER PAIN: ICD-10-CM

## 2023-04-25 DIAGNOSIS — M75.112 NONTRAUMATIC INCOMPLETE TEAR OF LEFT ROTATOR CUFF: Primary | ICD-10-CM

## 2023-04-25 DIAGNOSIS — M19.012 PRIMARY OSTEOARTHRITIS OF LEFT SHOULDER: ICD-10-CM

## 2023-04-25 PROCEDURE — 97110 THERAPEUTIC EXERCISES: CPT | Performed by: PHYSICAL THERAPIST

## 2023-04-25 PROCEDURE — 97140 MANUAL THERAPY 1/> REGIONS: CPT | Performed by: PHYSICAL THERAPIST

## 2023-04-25 NOTE — THERAPY PROGRESS REPORT/RE-CERT
"    Outpatient Physical Therapy Ortho Progress Note  [facility]     Patient Name: Sophia Neff  : 1945  MRN: [id number]  Today's Date: 2023      Visit Date: 2023     Patient seen for 8 PT sessions.  Patient reports \"a little\"% of improvement.  Next MD appt: 2023.  Recertification: 2023.    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 Nontraumatic incomplete tear of left rotator cuff  M75.112 726.13   2. Rotator cuff syndrome of left shoulder  M75.102 726.10   3. Chronic left shoulder pain  M25.512 719.41    G89.29 338.29   4. Primary osteoarthritis of left shoulder  M19.012 715.11              PT Ortho     Row Name 23 1400       Subjective Comments    Subjective Comments Patient reports she is wearing a regular bra today so she isn't sure if that is what has irritated the shoulder  -AJ       Precautions and Contraindications    " "Precautions Large Tear Protocol  -       Subjective Pain    Able to rate subjective pain? yes  -AJ    Pre-Treatment Pain Level --  5-6/10, \"not pain pain, but sore\"  -AJ    Post-Treatment Pain Level 2  -       Posture/Observations    Alignment Options Forward head;Thoracic kyphosis;Rounded shoulders  -AJ    Forward Head Moderate;Increased  -AJ    Thoracic Kyphosis Moderate;Increased  -AJ    Rounded Shoulders Bilateral:;Moderate;Increased  -AJ    Observations Incision healing  -AJ    Posture/Observations Comments No distress, wearing post op abduction sling with abduction pillow removed per MD office.  -AJ       Shoulder Girdle Palpation    AC Joint Left:;Tender;Swollen  -AJ       Left Upper Ext    Lt Shoulder Abduction PROM 127°  -AJ    Lt Shoulder Flexion PROM 135°  -AJ    Lt Shoulder External Rotation PROM 28° in scapular plane  -AJ    Lt Shoulder Internal Rotation PROM 39° in scapular plane  -AJ       MMT (Manual Muscle Testing)    General MMT Comments Deferred due to post status.  -        Strength Right    # Reps 1  -    Right Rung 2  -    Right  Test 1 45  -AJ     Strength Average Right 45  -        Strength Left    # Reps 1  -    Left Rung 2  -    Left  Test 1 0  Visable contraction and hold dynamometer I but does not register on dynamometer.  -     Strength Average Left 0  -       Sensation    Sensation WNL? WFL  -    Light Touch No apparent deficits  -       Hand  Strength     Strength Affected Side Bilateral  -          User Key  (r) = Recorded By, (t) = Taken By, (c) = Cosigned By    Initials Name Provider Type    AJ Edna Wells, PT DPT Physical Therapist                                   PT OP Goals     Row Name 04/25/23 1400          PT Short Term Goals    STG 1 I with HEP and have additions/changes by next re-certification.  -AJ     STG 1 Progress Met  -     STG 2 Patient to be more aware of posture and posture correction technique.  " -AJ     STG 2 Progress Ongoing;Progressing  -     STG 3 Patient to be compliant with sling wear per MD protocol.  -AJ     STG 3 Progress Met;Ongoing  -     STG 4 PROM L Shoulder flexion/abduction >=145°.  -AJ     STG 4 Progress Partially Met;Ongoing  -     STG 4 Progress Comments previously met  -     STG 5 PROM L shoulder IR >= 55° at 90° of shoulder abduction.  -     STG 5 Progress Progressing  -     STG 6 L  at the #2 setting within 10# of the contralateral side with average in 3 attempts.  -AJ     STG 6 Progress Ongoing  -     STG 7 AAROM L Shoulder flexion/abduction >=125° in standing..  -        Long Term Goals    LTG 1 Patient to have full PROM for the L shoulder in all planes.  -     LTG 2 Patient to have full L shoulder AAROM with 3-way pulley's.  -     LTG 3 AROM L Shoulder flexion/abduction >=155°.  -     LTG 4 AROM L shoulder IR >= 55° at 90° of shoulder abduction.  -     LTG 5 AROM L shoulder ER >= 75° at 90° of shoulder abduction.  -     LTG 6 L UE >= 4+/5.  -     LTG 7 Patient able to perform 3 minutes of OH activities with no increase in pain.  -     LTG 8 I with final HEP.  -        Time Calculation    PT Goal Re-Cert Due Date 05/16/23  -           User Key  (r) = Recorded By, (t) = Taken By, (c) = Cosigned By    Initials Name Provider Type    Edna Salguero, PT DPT Physical Therapist              Barriers to Rehab: Include significant or possible arthritic/degenerative changes that have occurred within the joint, The chronicity of this issue.     Safety Issues: None noted.      PT Assessment/Plan     Row Name 04/25/23 1400          PT Assessment    Functional Limitations Limitation in home management;Limitations in community activities;Performance in leisure activities;Performance in self-care ADL  -     Impairments Impaired muscle endurance;Impaired muscle power;Impaired flexibility;Joint integrity;Joint mobility;Pain;Posture;Range of  motion;Impaired muscle length;Impaired postural alignment;Muscle strength  -AJ     Assessment Comments 4 weeks 5 days post op. Some decrease in PROM for rotations in scapular plane. patient also has significant active guarding and difficulty relaxing with PROM. Also required cueing several times durng treatment to not move the shoulder actively while moving in her environment.  -AJ     Rehab Potential Good  -AJ     Patient/caregiver participated in establishment of treatment plan and goals Yes  -AJ     Patient would benefit from skilled therapy intervention Yes  -AJ        PT Plan    PT Frequency 2x/week  -AJ     Predicted Duration of Therapy Intervention (PT) 12-16 more visits  -AJ     PT Plan Comments COntinue with large tear protocol, progressing to AAROM at 6 weeks post op.  -AJ           User Key  (r) = Recorded By, (t) = Taken By, (c) = Cosigned By    Initials Name Provider Type    Edna Salguero, PT DPT Physical Therapist            Other therapeutic activities and/or exercises will be prescribed depending on the patient's progress or lack thereof.    Continue skilled therapy plan of care to meet remaining unmet goals. Therapy to focus on ROM,scap stab, strength, posture, all within MD protocol to ensure functional use of the UE for ADL/IADL use.       Modalities     Row Name 04/25/23 1400             Ice    Ice Applied Yes  -AJ      Location L shoulder  -AJ      PT Ice Rx Minutes 10  -AJ      Ice S/P Rx Yes  -AJ            User Key  (r) = Recorded By, (t) = Taken By, (c) = Cosigned By    Initials Name Provider Type    Edna Salguero, PT DPT Physical Therapist               OP Exercises     Row Name 04/25/23 1400             Subjective Comments    Subjective Comments Patient reports she is wearing a regular bra today so she isn't sure if that is what has irritated the shoulder  -AJ         Subjective Pain    Able to rate subjective pain? yes  -AJ      Pre-Treatment Pain Level --  5-6/10,  "\"not pain pain, but sore\"  -AJ      Post-Treatment Pain Level 2  -AJ         Exercise 1    Exercise Name 1 Pendulums F/R  -AJ      Time 1 3 min  -AJ         Exercise 2    Exercise Name 2 Elbow flexion/extension  -AJ      Reps 2 20  -AJ         Exercise 3    Exercise Name 3 Pendulums S/S  -AJ      Time 3 3 min  -AJ         Exercise 4    Exercise Name 4 Forearm Sup/Pro  -AJ      Reps 4 20  -AJ         Exercise 5    Exercise Name 5 Pendulums CW/CCW  -AJ      Time 5 3 min ea direction  -AJ         Exercise 6    Exercise Name 6 Scap retraction  -AJ      Sets 6 1  -AJ      Reps 6 20  -AJ      Time 6 5\" hold  -AJ         Exercise 7    Exercise Name 7 Inclince Table Slides - 3 way  -AJ      Time 7 5\" holds for 3 min each direction  -AJ      Additional Comments table notch 1  -AJ         Exercise 8    Exercise Name 8 Shoulder shrugs  -AJ      Sets 8 1  -AJ      Reps 8 20  -AJ      Time 8 5\" hold  -AJ         Exercise 9    Exercise Name 9 manual  -AJ            User Key  (r) = Recorded By, (t) = Taken By, (c) = Cosigned By    Initials Name Provider Type    Edna Salguero, PT DPT Physical Therapist              Manual Rx (last 36 hours)     Manual Treatments     Row Name 04/25/23 1500             Manual Rx 1    Manual Rx 1 Location L Shoulder  -AJ      Manual Rx 1 Type PROM with occilations at end range with gentle GHJ mobes/gentle distraction  -AJ      Manual Rx 1 Duration 12 min  -AJ            User Key  (r) = Recorded By, (t) = Taken By, (c) = Cosigned By    Initials Name Provider Type    Edna Salguero, PT DPT Physical Therapist                All therapeutic interventions performed today were to address current functional limitations and/or deficits in addressing all physical therapy goals.               Outcome Measure Options: Quick DASH  Quick DASH  Open a tight or new jar.: Mild Difficulty  Do heavy household chores (e.g., wash walls, wash floors): Unable  Carry a shopping bag or briefcase: Mild " Difficulty  Wash your back: Severe Difficulty  Use a knife to cut food: Moderate Difficulty  During the past week, to what extent has your arm, shoulder, or hand problem interfered with your normal social activites with family, friends, neighbors or groups?: Moderately  During the past week, were you limited in your work or other regular daily activities as a result of your arm, shoulder or hand problem?: Moderately Limited  Arm, Shoulder, or hand pain: Extreme  Tingling (pins and needles) in your arm, shoulder, or hand: None  During the past week, how much difficulty have you had sleeping because of the pain in your arm, shoulder or hand?: Mild Difficulty  Number of Questions Answered: 10  Quick DASH Score: 50         Time Calculation:     Start Time: 1422  Stop Time: 1535  Time Calculation (min): 73 min  PT Non-Billable Time (min): 10 min  Total Timed Code Minutes- PT: 63 minute(s)  Untimed Charges  PT Ice Rx Minutes: 10  Total Minutes  Untimed Charges Total Minutes: 10   Total Minutes: 10     Therapy Charges for Today     Code Description Service Date Service Provider Modifiers Qty    07374433106 HC PT THER SUPP EA 15 MIN 4/25/2023 Edna Wells, PT DPT GP 1    45079193320 HC PT THER PROC EA 15 MIN 4/25/2023 Edna Wells, PT DPT GP 3    61061621831 HC PT MANUAL THERAPY EA 15 MIN 4/25/2023 Edna Wells, PT DPT GP 1          PT G-Codes  Outcome Measure Options: Quick DASH  Quick DASH Score: 50       This document has been electronically signed by Edna Wells PT DPT, [facility] on April 25, 2023 16:21 CDT

## 2023-04-27 ENCOUNTER — TELEPHONE (OUTPATIENT)
Dept: PHYSICAL THERAPY | Facility: HOSPITAL | Age: 78
End: 2023-04-27
Payer: MEDICARE

## 2023-04-27 NOTE — TELEPHONE ENCOUNTER
Pt states she forgot about her appointment and was having issues gettinga  ride here.  Confirmed next scheduled appointment time and date.

## 2023-05-02 ENCOUNTER — HOSPITAL ENCOUNTER (OUTPATIENT)
Dept: PHYSICAL THERAPY | Facility: HOSPITAL | Age: 78
Setting detail: THERAPIES SERIES
Discharge: HOME | End: 2023-05-02
Payer: MEDICARE

## 2023-05-02 DIAGNOSIS — M75.102 ROTATOR CUFF SYNDROME OF LEFT SHOULDER: Primary | ICD-10-CM

## 2023-05-02 PROCEDURE — 97110 THERAPEUTIC EXERCISES: CPT | Performed by: PHYSICAL THERAPIST

## 2023-05-02 PROCEDURE — 97140 MANUAL THERAPY 1/> REGIONS: CPT | Performed by: PHYSICAL THERAPIST

## 2023-05-02 NOTE — THERAPY TREATMENT NOTE
"    Outpatient Physical Therapy Ortho Treatment Note  [facility]     Patient Name: Sophia Neff  : 1945  MRN: [id number]  Today's Date: 2023      Visit Date: 2023     Patient seen for 9 PT sessions.  Patient reports \"a little\"% of improvement.  Next MD appt: 2023.  Recertification: 2023.     Therapy Diagnosis: S/P L RCR/DCE/ACR/Bicep tedotomoy (DOS2023)    Visit Dx:    ICD-10-CM ICD-9-CM   1. 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 States sore in her elbow; states it's from wearing the sling and will be glad when she can get rid of it.  -KG       Precautions and Contraindications    Precautions Large Tear Protocol  -KG       Subjective Pain    Post-Treatment Pain Level 0  -KG       Posture/Observations    Posture/Observations Comments Cues for pendulum and " table slide form intermittently  -KG          User Key  (r) = Recorded By, (t) = Taken By, (c) = Cosigned By    Initials Name Provider Type    Roseline Major, PT Physical Therapist                             PT Assessment/Plan     Row Name 05/02/23 1400          PT Assessment    Assessment Comments Pt didn't seem as guarded with PROM this date and seemed to tolearte better. Intermittent performance cues needed with table slides & pendulums. Pt will be 6 weeks post op at next visit; should be able to progress to AA activities at that time.  -KG        PT Plan    PT Frequency 2x/week  -KG     PT Plan Comments Can start AA activities at next visit. Start gentle and progress as tolerated.  -KG           User Key  (r) = Recorded By, (t) = Taken By, (c) = Cosigned By    Initials Name Provider Type    Roseline Major, PT Physical Therapist                 Modalities     Row Name 05/02/23 1400             Ice    Patient reports will apply ice at home to involved area Yes  given ice bag to go  -KG            User Key  (r) = Recorded By, (t) = Taken By, (c) = Cosigned By    Initials Name Provider Type    Roseline Major, PT Physical Therapist               OP Exercises     Row Name 05/02/23 1400             Subjective Comments    Subjective Comments Pt states her shoulder feels fine. States sore in her elbow; states it's from wearing the sling and will be glad when she can get rid of it.  -KG         Subjective Pain    Able to rate subjective pain? yes  -KG      Pre-Treatment Pain Level 0  -KG      Post-Treatment Pain Level 0  -KG         Exercise 1    Exercise Name 1 Pendulums F/R  -KG      Time 1 3 min  -KG         Exercise 2    Exercise Name 2 AROM Elbow flexion/extension  -KG      Sets 2 1  -KG      Reps 2 20  -KG         Exercise 3    Exercise Name 3 Pendulums S/S  -KG      Cueing 3 Verbal  -KG      Time 3 3 min  -KG         Exercise 4    Exercise Name 4 Forearm Sup/Pro  -KG      Sets 4 1  -KG      Reps  "4 20  -KG         Exercise 5    Exercise Name 5 Pendulums CW/CCW  -KG      Time 5 3 min ea direction  -KG         Exercise 6    Exercise Name 6 Inclince Table Slides - 3 way  -KG      Time 6 5\" holds for 3 min each direction  -KG      Additional Comments table notch 1  -KG         Exercise 7    Exercise Name 7 Shoulder shrugs  -KG      Cueing 7 Verbal  -KG      Sets 7 1  -KG      Reps 7 20  -KG         Exercise 8    Exercise Name 8 Seated scap retraction  -KG      Cueing 8 Verbal  -KG      Sets 8 1  -KG      Reps 8 20  -KG      Time 8 5\" hold  -KG         Exercise 9    Exercise Name 9 Manual - see flowsheet  -KG            User Key  (r) = Recorded By, (t) = Taken By, (c) = Cosigned By    Initials Name Provider Type    KG Roseline Hurtado, PT Physical Therapist                         Manual Rx (last 36 hours)     Manual Treatments     Row Name 05/02/23 1400             Manual Rx 1    Manual Rx 1 Location L Shoulder  -KG      Manual Rx 1 Type PROM all planes with occilations at end range with gentle GHJ mobes/gentle distraction  -KG      Manual Rx 1 Duration 10 min  -KG            User Key  (r) = Recorded By, (t) = Taken By, (c) = Cosigned By    Initials Name Provider Type    KG Roseline Hurtado, PT Physical Therapist                 PT OP Goals     Row Name 05/02/23 1400          PT Short Term Goals    STG 1 I with HEP and have additions/changes by next re-certification.  -KG     STG 1 Progress Met  -KG     STG 2 Patient to be more aware of posture and posture correction technique.  -KG     STG 2 Progress Ongoing;Progressing  -KG     STG 3 Patient to be compliant with sling wear per MD protocol.  -KG     STG 3 Progress Met;Ongoing  -KG     STG 4 PROM L Shoulder flexion/abduction >=145°.  -KG     STG 4 Progress Partially Met;Ongoing  -KG     STG 5 PROM L shoulder IR >= 55° at 90° of shoulder abduction.  -KG     STG 5 Progress Progressing  -KG     STG 6 L  at the #2 setting within 10# of the contralateral side " with average in 3 attempts.  -KG     STG 6 Progress Ongoing  -KG     STG 7 AAROM L Shoulder flexion/abduction >=125° in standing..  -KG        Long Term Goals    LTG 1 Patient to have full PROM for the L shoulder in all planes.  -KG     LTG 2 Patient to have full L shoulder AAROM with 3-way pulley's.  -KG     LTG 3 AROM L Shoulder flexion/abduction >=155°.  -KG     LTG 4 AROM L shoulder IR >= 55° at 90° of shoulder abduction.  -KG     LTG 5 AROM L shoulder ER >= 75° at 90° of shoulder abduction.  -KG     LTG 6 L UE >= 4+/5.  -KG     LTG 7 Patient able to perform 3 minutes of OH activities with no increase in pain.  -KG     LTG 8 I with final HEP.  -KG        Time Calculation    PT Goal Re-Cert Due Date 05/16/23  -KG           User Key  (r) = Recorded By, (t) = Taken By, (c) = Cosigned By    Initials Name Provider Type    KG Roseline Hurtado, PT Physical Therapist                Therapy Education  Given: HEP, Symptoms/condition management, Pain management  Program: Reinforced  How Provided: Verbal  Provided to: Patient  Level of Understanding: Verbalized, Demonstrated              Time Calculation:   Start Time: 1434  Stop Time: 1518  Time Calculation (min): 44 min  Therapy Charges for Today     Code Description Service Date Service Provider Modifiers Qty    78857088071 HC PT THER PROC EA 15 MIN 5/2/2023 Roseline Hurtado, PT GP 2    39668536936 HC PT MANUAL THERAPY EA 15 MIN 5/2/2023 Roseline Hurtado, PT GP 1                    Roseline Hurtado PT  5/2/2023

## 2023-05-04 ENCOUNTER — HOSPITAL ENCOUNTER (OUTPATIENT)
Dept: PHYSICAL THERAPY | Facility: HOSPITAL | Age: 78
Setting detail: THERAPIES SERIES
Discharge: HOME | End: 2023-05-04
Payer: MEDICARE

## 2023-05-04 DIAGNOSIS — M75.102 ROTATOR CUFF SYNDROME OF LEFT SHOULDER: Primary | ICD-10-CM

## 2023-05-04 DIAGNOSIS — M75.112 NONTRAUMATIC INCOMPLETE TEAR OF LEFT ROTATOR CUFF: ICD-10-CM

## 2023-05-04 DIAGNOSIS — M25.512 CHRONIC LEFT SHOULDER PAIN: ICD-10-CM

## 2023-05-04 DIAGNOSIS — G89.29 CHRONIC LEFT SHOULDER PAIN: ICD-10-CM

## 2023-05-04 PROCEDURE — 97110 THERAPEUTIC EXERCISES: CPT

## 2023-05-04 PROCEDURE — 97140 MANUAL THERAPY 1/> REGIONS: CPT

## 2023-05-04 NOTE — THERAPY TREATMENT NOTE
"    Outpatient Physical Therapy Ortho Treatment Note  [facility]     Patient Name: Sophia Neff  : 1945  MRN: [id number]  Today's Date: 2023      Visit Date: 2023     Patient seen for 10 PT sessions.  Patient reports \"a little\"% of improvement.  Next MD appt: TBD  Recertification: 2023.     Therapy Diagnosis: S/P L RCR/DCE/ACR/Bicep tedotomoy (DOS2023)    Visit Dx:    ICD-10-CM ICD-9-CM   1. Rotator cuff syndrome of left shoulder  M75.102 726.10   2. Nontraumatic incomplete tear of left rotator cuff  M75.112 726.13   3. 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 states that she saw the MD yesterday and they discharged the use of her sling.  -       Precautions and Contraindications    Precautions Large Tear Protocol  -    Contraindications 6 weeks " "starts today  -       Subjective Pain    Able to rate subjective pain? yes  -    Pre-Treatment Pain Level 0  -    Post-Treatment Pain Level 6  -       Left Upper Ext    Lt Shoulder Abduction PROM 126°  -    Lt Shoulder Flexion PROM 146°  -          User Key  (r) = Recorded By, (t) = Taken By, (c) = Cosigned By    Initials Name Provider Type     Paula Ahumada PTA Physical Therapist Assistant                             PT Assessment/Plan     Row Name 05/04/23 0900          PT Assessment    Assessment Comments patient is 6 weeks post op today. advanced per protocol. she has poor postural awareness and needs consistent cueing in seated position for appropriate postureing. she is guarded with PROM today and initially has a decrease in PROM shoulder ABD. with continued gentle oscillations and joint mobes able to achieve same PROM ABD as last recheck. she attributes her pain to her \"fibro\" in her upper arm stating that she has it in both upper arms.  -        PT Plan    PT Frequency 2x/week  -     PT Plan Comments next visit start tband B Shld Ext  -           User Key  (r) = Recorded By, (t) = Taken By, (c) = Cosigned By    Initials Name Provider Type     Paula Ahumada PTA Physical Therapist Assistant                 Modalities     Row Name 05/04/23 0900             Ice    Patient denies application of Ice Yes  -      Patient reports will apply ice at home to involved area Yes  defers ice to go  -            User Key  (r) = Recorded By, (t) = Taken By, (c) = Cosigned By    Initials Name Provider Type     Paula Ahumada PTA Physical Therapist Assistant               OP Exercises     Row Name 05/04/23 0900             Subjective Comments    Subjective Comments reports no pain in her shoulder, its just sore. states that she saw the MD yesterday and they discharged the use of her sling.  -         Subjective Pain    Able to rate subjective pain? yes  -      Pre-Treatment Pain Level 0  - "      Post-Treatment Pain Level 6  -         Exercise 1    Exercise Name 1 Pro II UE Fwd/Retro  -      Time 1 10 minutes  -      Additional Comments L 1.0  -         Exercise 2    Exercise Name 2 Pulley's 3 way  -      Time 2 3 minutes each  -         Exercise 3    Exercise Name 3 Shoulder Shrugs  -MH      Reps 3 20  -MH      Time 3 5 sec hold  -         Exercise 4    Exercise Name 4 Scap Squeezes  -MH      Reps 4 20  -MH      Time 4 5 sec hold  -         Exercise 5    Exercise Name 5 Semi Reclined Wand Shoulder Flexion  -MH      Reps 5 20  -MH      Time 5 5 sec hold  -         Exercise 6    Exercise Name 6 Seated Wand Shoulder ABD  -MH      Reps 6 20  -MH      Time 6 5 sec hold  -         Exercise 7    Exercise Name 7 see manual  -            User Key  (r) = Recorded By, (t) = Taken By, (c) = Cosigned By    Initials Name Provider Type     Paula Ahumada PTA Physical Therapist Assistant                         Manual Rx (last 36 hours)     Manual Treatments     Row Name 05/04/23 0900             Manual Rx 1    Manual Rx 1 Location L Shoulder  -      Manual Rx 1 Type PROM all planes with occilations at end range with gentle GHJ mobes/gentle distraction  -      Manual Rx 1 Duration 15 minutes  -            User Key  (r) = Recorded By, (t) = Taken By, (c) = Cosigned By    Initials Name Provider Type    Paula Luciano PTA Physical Therapist Assistant                 PT OP Goals     Row Name 05/04/23 0900          PT Short Term Goals    STG 1 I with HEP and have additions/changes by next re-certification.  -     STG 1 Progress Met  -     STG 2 Patient to be more aware of posture and posture correction technique.  -     STG 2 Progress Ongoing;Progressing  -     STG 3 Patient to be compliant with sling wear per MD protocol.  -     STG 3 Progress Met;Ongoing  -     STG 4 PROM L Shoulder flexion/abduction >=145°.  -     STG 4 Progress Partially Met;Ongoing  -     STG 5 PROM L  shoulder IR >= 55° at 90° of shoulder abduction.  -     STG 5 Progress Progressing  -     STG 6 L  at the #2 setting within 10# of the contralateral side with average in 3 attempts.  -     STG 6 Progress Ongoing  -     STG 7 AAROM L Shoulder flexion/abduction >=125° in standing..  -        Long Term Goals    LTG 1 Patient to have full PROM for the L shoulder in all planes.  -     LTG 2 Patient to have full L shoulder AAROM with 3-way pulley's.  -     LTG 3 AROM L Shoulder flexion/abduction >=155°.  -     LTG 4 AROM L shoulder IR >= 55° at 90° of shoulder abduction.  -     LTG 5 AROM L shoulder ER >= 75° at 90° of shoulder abduction.  -     LTG 6 L UE >= 4+/5.  -     LTG 7 Patient able to perform 3 minutes of OH activities with no increase in pain.  -     LTG 8 I with final HEP.  -        Time Calculation    PT Goal Re-Cert Due Date 05/16/23  -           User Key  (r) = Recorded By, (t) = Taken By, (c) = Cosigned By    Initials Name Provider Type     Paula Ahumada PTA Physical Therapist Assistant                Therapy Education  Education Details: Pulley's 3 way to HEP  Given: HEP, Symptoms/condition management, Pain management  Program: Reinforced, New  How Provided: Verbal, Written, Demonstration  Provided to: Patient  Level of Understanding: Teach back education performed, Verbalized, Demonstrated              Time Calculation:   Start Time: 0923  Stop Time: 1031  Time Calculation (min): 68 min  Total Timed Code Minutes- PT: 68 minute(s)  Therapy Charges for Today     Code Description Service Date Service Provider Modifiers Qty    81783897620 HC PT THER SUPP EA 15 MIN 5/4/2023 Paula Ahumada PTA GP, CQ 1    91396699407 HC PT THER PROC EA 15 MIN 5/4/2023 Paula Ahumada PTA GP, CQ 4    84763567557 HC PT MANUAL THERAPY EA 15 MIN 5/4/2023 Paula Ahumada PTA GP, CQ 1                    Paula Ahumada PTA  5/4/2023       This document has been electronically signed by Paula GUSTAFSON  Zita, PTA on May 4, 2023 10:44 CDT

## 2023-05-09 ENCOUNTER — HOSPITAL ENCOUNTER (OUTPATIENT)
Dept: PHYSICAL THERAPY | Facility: HOSPITAL | Age: 78
Setting detail: THERAPIES SERIES
Discharge: HOME | End: 2023-05-09
Payer: MEDICARE

## 2023-05-09 DIAGNOSIS — G89.29 CHRONIC LEFT SHOULDER PAIN: ICD-10-CM

## 2023-05-09 DIAGNOSIS — M75.102 ROTATOR CUFF SYNDROME OF LEFT SHOULDER: Primary | ICD-10-CM

## 2023-05-09 DIAGNOSIS — M25.512 CHRONIC LEFT SHOULDER PAIN: ICD-10-CM

## 2023-05-09 DIAGNOSIS — M19.012 PRIMARY OSTEOARTHRITIS OF LEFT SHOULDER: ICD-10-CM

## 2023-05-09 DIAGNOSIS — M75.112 NONTRAUMATIC INCOMPLETE TEAR OF LEFT ROTATOR CUFF: ICD-10-CM

## 2023-05-09 PROCEDURE — 97110 THERAPEUTIC EXERCISES: CPT | Performed by: PHYSICAL THERAPIST

## 2023-05-09 NOTE — THERAPY TREATMENT NOTE
"    Outpatient Physical Therapy Ortho Treatment Note  [facility]     Patient Name: Sophia Neff  : 1945  MRN: [id number]  Today's Date: 2023      Visit Date: 2023     Patient seen for 11 PT sessions.  Patient reports \"a little bit\"% of improvement.  Next MD appt: TBD.  Recertification: 2023.    Therapy Diagnosis: S/P L RCR/DCE/ACR/Bicep tedotomoy (DOS2023)        Visit Dx:    ICD-10-CM ICD-9-CM   1. Rotator cuff syndrome of left shoulder  M75.102 726.10   2. Nontraumatic incomplete tear of left rotator cuff  M75.112 726.13   3. Chronic left shoulder pain  M25.512 719.41    G89.29 338.29   4. 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    • Plantar fasciitis     Bilateral        Past Surgical History:   Procedure Laterality Date   • APPENDECTOMY  1970   • BREAST SURGERY Bilateral 1992    Reduction   • GALLBLADDER SURGERY  2004   • HYSTERECTOMY     • NECK SURGERY  1989   • PELVIC FLOOR REPAIR  2009   • PELVIC FLOOR REPAIR  2009   • ROTATOR CUFF REPAIR W/ DISTAL CLAVICLE EXCISION Left 2023        PT Ortho     Row Name 23 0800       Subjective Pain    Post-Treatment Pain Level 0  -AJ          User Key  (r) = Recorded By, (t) = Taken By, (c) = Cosigned By    Initials Name Provider Type    Edna Salguero, PT DPT Physical Therapist    "                          PT Assessment/Plan     Row Name 05/09/23 0800          PT Assessment    Assessment Comments Patient is very guarded with PROM today and had to stop due to active guarding. patient doing well with Cytosorbents activities.  -AJ        PT Plan    PT Frequency 2x/week  -AJ     PT Plan Comments Continue per protocol. Attempt PROM again to gain ROM. Try wand standing versus semi-reclinced.  -AJ           User Key  (r) = Recorded By, (t) = Taken By, (c) = Cosigned By    Initials Name Provider Type    Edna Salguero, PT DPT Physical Therapist                 Modalities     Row Name 05/09/23 0800             Ice    Ice Applied Yes  -AJ      Location L shoulder  -AJ      PT Ice Rx Minutes 10  -AJ      Ice S/P Rx Yes  -AJ            User Key  (r) = Recorded By, (t) = Taken By, (c) = Cosigned By    Initials Name Provider Type    Edna Salguero, PT DPT Physical Therapist               OP Exercises     Row Name 05/09/23 0800             Subjective Comments    Subjective Comments Patient reports no pain in the shoulder, but elbow and hand are hurting on that side.  -AJ         Subjective Pain    Able to rate subjective pain? yes  -AJ      Pre-Treatment Pain Level 0  -AJ      Post-Treatment Pain Level 0  -AJ      Subjective Pain Comment Shoulder  -AJ         Exercise 1    Exercise Name 1 Pro II UE Fwd/Retro  -AJ      Time 1 10 minutes  -AJ      Additional Comments L 1.0  -AJ         Exercise 2    Exercise Name 2 Pulley's 3 way  -AJ      Time 2 3 minutes each  -AJ         Exercise 3    Exercise Name 3 Shoulder Shrugs  -AJ      Reps 3 20  -AJ      Time 3 5 sec hold  -AJ         Exercise 4    Exercise Name 4 Scap Squeezes  -AJ      Reps 4 20  -AJ      Time 4 5 sec hold  -AJ         Exercise 5    Exercise Name 5 Tband B shoulder ext  -AJ      Sets 5 2  -AJ      Reps 5 10  -AJ      Additional Comments RTB  -AJ         Exercise 6    Exercise Name 6 Semi Reclined Wand Shoulder Flexion  -AJ       "Sets 6 1  -      Reps 6 20  -      Time 6 5 sec hold  -AJ         Exercise 7    Exercise Name 7 Semireclined wand abduction  -      Sets 7 1  -AJ      Reps 7 20  -AJ      Time 7 5\" hold  -AJ         Exercise 8    Exercise Name 8 Semireclined wand IR/ER  -      Sets 8 1  -AJ      Reps 8 20  -AJ      Time 8 5\" hold  -AJ         Exercise 9    Exercise Name 9 Attempted PROM  -      Additional Comments Too guarded.  -            User Key  (r) = Recorded By, (t) = Taken By, (c) = Cosigned By    Initials Name Provider Type    Edna Salguero, PT DPT Physical Therapist               All therapeutic interventions performed today were to address current functional limitations and/or deficits in addressing all physical therapy goals.                  PT OP Goals     Row Name 05/09/23 0800          PT Short Term Goals    STG 1 I with HEP and have additions/changes by next re-certification.  -     STG 1 Progress Met  -     STG 2 Patient to be more aware of posture and posture correction technique.  -     STG 2 Progress Partially Met;Ongoing;Progressing  -     STG 3 Patient to be compliant with sling wear per MD protocol.  -     STG 3 Progress Met  -     STG 4 PROM L Shoulder flexion/abduction >=145°.  -     STG 4 Progress Partially Met;Ongoing  -     STG 5 PROM L shoulder IR >= 55° at 90° of shoulder abduction.  -     STG 5 Progress Progressing  -     STG 6 L  at the #2 setting within 10# of the contralateral side with average in 3 attempts.  -     STG 6 Progress Ongoing  -     STG 7 AAROM L Shoulder flexion/abduction >=125° in standing..  -        Long Term Goals    LTG 1 Patient to have full PROM for the L shoulder in all planes.  -     LTG 2 Patient to have full L shoulder AAROM with 3-way pulley's.  -     LTG 3 AROM L Shoulder flexion/abduction >=155°.  -     LTG 4 AROM L shoulder IR >= 55° at 90° of shoulder abduction.  -     LTG 5 AROM L shoulder ER >= 75° at 90° " of shoulder abduction.  -     LTG 6 L UE >= 4+/5.  -     LTG 7 Patient able to perform 3 minutes of OH activities with no increase in pain.  -     LTG 8 I with final HEP.  -        Time Calculation    PT Goal Re-Cert Due Date 05/16/23  -           User Key  (r) = Recorded By, (t) = Taken By, (c) = Cosigned By    Initials Name Provider Type    Edna Salguero, PT DPT Physical Therapist                               Time Calculation:   Start Time: 0828  Stop Time: 0928  Time Calculation (min): 60 min  PT Non-Billable Time (min): 10 min  Total Timed Code Minutes- PT: 50 minute(s)  Untimed Charges  PT Ice Rx Minutes: 10  Total Minutes  Untimed Charges Total Minutes: 10   Total Minutes: 10  Therapy Charges for Today     Code Description Service Date Service Provider Modifiers Qty    43312833620 HC PT THER SUPP EA 15 MIN 5/9/2023 Edna Wells, PT DPT GP 1    64103655205 HC PT THER PROC EA 15 MIN 5/9/2023 Edna Wells, PT DPT GP 3                This document has been electronically signed by Edna Wells PT [facility], [facility] on May 9, 2023 09:37 CDT

## 2023-05-11 ENCOUNTER — HOSPITAL ENCOUNTER (OUTPATIENT)
Dept: PHYSICAL THERAPY | Facility: HOSPITAL | Age: 78
Setting detail: THERAPIES SERIES
Discharge: HOME | End: 2023-05-11
Payer: MEDICARE

## 2023-05-11 DIAGNOSIS — G89.29 CHRONIC LEFT SHOULDER PAIN: ICD-10-CM

## 2023-05-11 DIAGNOSIS — M75.112 NONTRAUMATIC INCOMPLETE TEAR OF LEFT ROTATOR CUFF: ICD-10-CM

## 2023-05-11 DIAGNOSIS — M19.012 PRIMARY OSTEOARTHRITIS OF LEFT SHOULDER: ICD-10-CM

## 2023-05-11 DIAGNOSIS — M25.512 CHRONIC LEFT SHOULDER PAIN: ICD-10-CM

## 2023-05-11 DIAGNOSIS — M75.102 ROTATOR CUFF SYNDROME OF LEFT SHOULDER: Primary | ICD-10-CM

## 2023-05-11 PROCEDURE — 97110 THERAPEUTIC EXERCISES: CPT | Performed by: PHYSICAL THERAPIST

## 2023-05-11 NOTE — THERAPY TREATMENT NOTE
"    Outpatient Physical Therapy Ortho Treatment Note  [facility]     Patient Name: Sophia Neff  : 1945  MRN: [id number]  Today's Date: 2023      Visit Date: 2023     Patient seen for 12 PT sessions.  Patient reports \"a little bit\"% of improvement.  Next MD appt: TBDIPAK.  Recertification: 2023.     Therapy Diagnosis: S/P L RCR/DCE/ACR/Bicep tedotomoy (DOS2023)    Visit Dx:    ICD-10-CM ICD-9-CM   1. Rotator cuff syndrome of left shoulder  M75.102 726.10   2. Nontraumatic incomplete tear of left rotator cuff  M75.112 726.13   3. Chronic left shoulder pain  M25.512 719.41    G89.29 338.29   4. 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    • Plantar fasciitis     Bilateral        Past Surgical History:   Procedure Laterality Date   • APPENDECTOMY  1970   • BREAST SURGERY Bilateral 1992    Reduction   • GALLBLADDER SURGERY  2004   • HYSTERECTOMY     • NECK SURGERY  1989   • PELVIC FLOOR REPAIR  2009   • PELVIC FLOOR REPAIR  2009   • ROTATOR CUFF REPAIR W/ DISTAL CLAVICLE EXCISION Left 2023        PT Ortho     Row Name 23 1400       Precautions and Contraindications    Precautions Large Tear Protocol  -AJ       Subjective Pain    Post-Treatment Pain Level 0  -AJ          User Key  (r) = Recorded By, (t) = Taken By, (c) = Cosigned By    Initials " "Name Provider Type    Edna Salguero, PT DPT Physical Therapist                             PT Assessment/Plan     Row Name 05/11/23 1400          PT Assessment    Assessment Comments 7 weeks post op today. Did well with atanding wand ther ex. Had to encourge patient to go into slightly uncomfortable range and to not just stop as soon as she feels it. No complaints of shoulder pain throught treatment.  -AJ        PT Plan    PT Frequency 2x/week  -AJ     PT Plan Comments Add wall slides next session.  -AJ           User Key  (r) = Recorded By, (t) = Taken By, (c) = Cosigned By    Initials Name Provider Type    Edna Salguero, PT DPT Physical Therapist                 Modalities     Row Name 05/11/23 1400             Ice    Ice Applied Yes  -AJ      Location L shoulder to go.  -AJ            User Key  (r) = Recorded By, (t) = Taken By, (c) = Cosigned By    Initials Name Provider Type    Edna Salguero, PT DPT Physical Therapist               OP Exercises     Row Name 05/11/23 1400             Subjective Comments    Subjective Comments Patient reports that her hand and arm are painful, but the shoulder is just sore.  -AJ         Subjective Pain    Able to rate subjective pain? yes  -AJ      Pre-Treatment Pain Level 0  -AJ      Post-Treatment Pain Level 0  -AJ         Exercise 1    Exercise Name 1 Pro II UE Fwd/Retro  -AJ      Time 1 10 minutes  -AJ      Additional Comments L 1.0  -AJ         Exercise 2    Exercise Name 2 Pulley's 3 way  -AJ      Time 2 3 minutes each  -AJ         Exercise 3    Exercise Name 3 St. wand flex/abd  -AJ      Sets 3 2  -AJ      Reps 3 10  -AJ      Additional Comments altternating  -AJ         Exercise 4    Exercise Name 4 St. wand ER @ 0° abduction  -AJ      Sets 4 2  -AJ      Reps 4 10  -AJ      Time 4 5\" hold  -AJ         Exercise 5    Exercise Name 5 St. wand Ext  -AJ      Sets 5 2  -AJ      Reps 5 10  -AJ         Exercise 6    Exercise Name 6 Scap squeezes  " "-      Sets 6 1  -      Reps 6 20  -      Time 6 5\" hold  -         Exercise 7    Exercise Name 7 L UT S  -      Reps 7 2  -      Time 7 30 seconds  -            User Key  (r) = Recorded By, (t) = Taken By, (c) = Cosigned By    Initials Name Provider Type    Edna Salguero, PT DPT Physical Therapist               All therapeutic interventions performed today were to address current functional limitations and/or deficits in addressing all physical therapy goals.                  PT OP Goals     Row Name 05/11/23 1400          PT Short Term Goals    STG 1 I with HEP and have additions/changes by next re-certification.  -     STG 1 Progress Met  -     STG 2 Patient to be more aware of posture and posture correction technique.  -     STG 2 Progress Partially Met;Ongoing;Progressing  -     STG 3 Patient to be compliant with sling wear per MD protocol.  -     STG 3 Progress Met  -     STG 4 PROM L Shoulder flexion/abduction >=145°.  -     STG 4 Progress Partially Met;Ongoing  -     STG 5 PROM L shoulder IR >= 55° at 90° of shoulder abduction.  -     STG 5 Progress Progressing  -     STG 6 L  at the #2 setting within 10# of the contralateral side with average in 3 attempts.  -     STG 6 Progress Ongoing  -     STG 7 AAROM L Shoulder flexion/abduction >=125° in standing..  -        Long Term Goals    LTG 1 Patient to have full PROM for the L shoulder in all planes.  -     LTG 2 Patient to have full L shoulder AAROM with 3-way pulley's.  -     LTG 3 AROM L Shoulder flexion/abduction >=155°.  -     LTG 4 AROM L shoulder IR >= 55° at 90° of shoulder abduction.  -     LTG 5 AROM L shoulder ER >= 75° at 90° of shoulder abduction.  -     LTG 6 L UE >= 4+/5.  -     LTG 7 Patient able to perform 3 minutes of OH activities with no increase in pain.  -     LTG 8 I with final HEP.  -        Time Calculation    PT Goal Re-Cert Due Date 05/16/23  -           User " Key  (r) = Recorded By, (t) = Taken By, (c) = Cosigned By    Initials Name Provider Type    Edna Salguero, PT DPT Physical Therapist                               Time Calculation:   Start Time: 1429  Stop Time: 1515  Time Calculation (min): 46 min  Total Timed Code Minutes- PT: 46 minute(s)  Therapy Charges for Today     Code Description Service Date Service Provider Modifiers Qty    29045519265  PT THER SUPP EA 15 MIN 5/11/2023 Edna Wells, PT DPT GP 1    [id number]  PT THER PROC EA 15 MIN 5/11/2023 Edna Welsl, PT DPT GP 3                  This document has been electronically signed by Edna Wells PT DPT, [facility] on May 11, 2023 15:55 CDT

## 2023-05-16 ENCOUNTER — HOSPITAL ENCOUNTER (OUTPATIENT)
Dept: PHYSICAL THERAPY | Facility: HOSPITAL | Age: 78
Setting detail: THERAPIES SERIES
Discharge: HOME | End: 2023-05-16
Payer: MEDICARE

## 2023-05-16 DIAGNOSIS — M19.012 PRIMARY OSTEOARTHRITIS OF LEFT SHOULDER: ICD-10-CM

## 2023-05-16 DIAGNOSIS — G89.29 CHRONIC LEFT SHOULDER PAIN: ICD-10-CM

## 2023-05-16 DIAGNOSIS — M25.512 CHRONIC LEFT SHOULDER PAIN: ICD-10-CM

## 2023-05-16 DIAGNOSIS — M75.112 NONTRAUMATIC INCOMPLETE TEAR OF LEFT ROTATOR CUFF: ICD-10-CM

## 2023-05-16 DIAGNOSIS — M75.102 ROTATOR CUFF SYNDROME OF LEFT SHOULDER: Primary | ICD-10-CM

## 2023-05-16 PROCEDURE — 97140 MANUAL THERAPY 1/> REGIONS: CPT | Performed by: PHYSICAL THERAPIST

## 2023-05-16 PROCEDURE — 97110 THERAPEUTIC EXERCISES: CPT | Performed by: PHYSICAL THERAPIST

## 2023-05-16 NOTE — THERAPY PROGRESS REPORT/RE-CERT
"    Outpatient Physical Therapy Ortho Progress Note  [facility]     Patient Name: Sophia Neff  : 1945  MRN: [id number]  Today's Date: 2023      Visit Date: 2023     Patient seen for 13 PT sessions.  Patient reports 50-60\"% of improvement.  Next MD appt: KANE.  Recertification: 2023.     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 Rotator cuff syndrome of left shoulder  M75.102 726.10   2. Nontraumatic incomplete tear of left rotator cuff  M75.112 726.13   3. Chronic left shoulder pain  M25.512 719.41    G89.29 338.29   4. Primary osteoarthritis of left shoulder  M19.012 715.11              PT Ortho     Row Name 23 1500       Subjective Comments    Subjective Comments Patient reports her shoulder feels okay, but she still gets pain on/off in upper arm.  -AJ       Precautions and Contraindications    Precautions Large Tear Protocol  -AJ    "    Posture/Observations    Alignment Options Forward head;Thoracic kyphosis;Rounded shoulders  -AJ    Forward Head Moderate;Increased  -AJ    Thoracic Kyphosis Moderate;Increased  -AJ    Rounded Shoulders Bilateral:;Moderate;Increased  -AJ    Observations Incision healing  -AJ    Posture/Observations Comments No distress, no sling present.  -AJ       Shoulder Girdle Palpation    AC Joint Left:;Tender  -AJ       Left Upper Ext    Lt Shoulder Abduction PROM 120°  -AJ    Lt Shoulder Flexion PROM 151°  -AJ    Lt Shoulder External Rotation PROM 64°  @ 90° of shoulder abduction  -AJ    Lt Shoulder Internal Rotation PROM 49°  @ 90° of shoulder abduction  -AJ    Lt Upper Extremity Comments  AAROM in standing with wand for L shoulder: flexion 97°, abduction 94°  -AJ       MMT (Manual Muscle Testing)    General MMT Comments Deferred due to post status.  -AJ        Strength Right    # Reps 1  -AJ    Right Rung 2  -AJ    Right  Test 1 43  -AJ     Strength Average Right 43  -AJ        Strength Left    # Reps 1  -AJ    Left Rung 2  -AJ    Left  Test 1 18  -AJ     Strength Average Left 18  -AJ       Sensation    Sensation WNL? WFL  -AJ    Light Touch No apparent deficits  -AJ       Transfers    Comment, (Transfers) I with all transfers.  -AJ       Gait/Stairs (Locomotion)    Comment, (Gait/Stairs) FWB, non-antalgic gait, no assistive device, no significant deviations noted, normal arm swing with gait.  -AJ       Hand  Strength     Strength Affected Side Bilateral  -AJ          User Key  (r) = Recorded By, (t) = Taken By, (c) = Cosigned By    Initials Name Provider Type    Edna Salguero, PT DPT Physical Therapist                            Therapy Education  Education Details: HEP: add wall slides  Given: HEP, Symptoms/condition management, Pain management, Posture/body mechanics, Other (comment) (POC)  Program: New, Reinforced, Progressed  How Provided: Verbal, Demonstration  Provided to:  Patient  Level of Understanding: Teach back education performed, Verbalized, Demonstrated      PT OP Goals     Row Name 05/16/23 1500          PT Short Term Goals    STG 1 I with HEP and have additions/changes by next re-certification.  -AJ     STG 1 Progress Met  -AJ     STG 2 Patient to be more aware of posture and posture correction technique.  -AJ     STG 2 Progress Partially Met;Ongoing;Progressing  -AJ     STG 3 Patient to be compliant with sling wear per MD protocol.  -AJ     STG 3 Progress Met  -AJ     STG 4 PROM L Shoulder flexion/abduction >=145°.  -AJ     STG 4 Progress Partially Met;Ongoing  -AJ     STG 5 PROM L shoulder IR >= 55° at 90° of shoulder abduction.  -AJ     STG 5 Progress Ongoing;Progressing  -AJ     STG 6 L  at the #2 setting within 10# of the contralateral side with average in 3 attempts.  -AJ     STG 6 Progress Ongoing;Progressing  -AJ     STG 7 AAROM L Shoulder flexion/abduction >=125° in standing..  -AJ     STG 7 Progress Ongoing;Progressing  -AJ        Long Term Goals    LTG 1 Patient to have full PROM for the L shoulder in all planes.  -AJ     LTG 1 Progress Ongoing;Progressing  -AJ     LTG 2 Patient to have full L shoulder AAROM with 3-way pulley's.  -AJ     LTG 2 Progress Partially Met;Ongoing;Progressing  -AJ     LTG 3 AROM L Shoulder flexion/abduction >=155°.  -AJ     LTG 4 AROM L shoulder IR >= 55° at 90° of shoulder abduction.  -AJ     LTG 5 AROM L shoulder ER >= 75° at 90° of shoulder abduction.  -AJ     LTG 6 L UE >= 4+/5.  -AJ     LTG 7 Patient able to perform 3 minutes of OH activities with no increase in pain.  -AJ     LTG 8 I with final HEP.  -RIAZ        Time Calculation    PT Goal Re-Cert Due Date 06/06/23  -RIAZ           User Key  (r) = Recorded By, (t) = Taken By, (c) = Cosigned By    Initials Name Provider Type    Edna Salguero, PT DPT Physical Therapist              Barriers to Rehab: Include significant or possible arthritic/degenerative changes that have  occurred within the joint, The chronicity of this issue.     Safety Issues: None noted.      PT Assessment/Plan     Row Name 05/16/23 1500          PT Assessment    Functional Limitations Limitation in home management;Limitations in community activities;Performance in leisure activities;Performance in self-care ADL  -AJ     Impairments Impaired muscle endurance;Impaired muscle power;Impaired flexibility;Joint integrity;Joint mobility;Pain;Posture;Range of motion;Impaired muscle length;Impaired postural alignment;Muscle strength  -     Assessment Comments Patient was better able to tolerate manual today with therapist applied stretches. Firm end feel with abduction stretching/PROM. Very slowly making progress.  -AJ     Rehab Potential Fair  -AJ     Patient/caregiver participated in establishment of treatment plan and goals Yes  -AJ     Patient would benefit from skilled therapy intervention Yes  -AJ        PT Plan    PT Frequency 2x/week  -AJ     Predicted Duration of Therapy Intervention (PT) 10-12 more visits  -     PT Plan Comments Start AROM per ptotocol starting next week as patient will be in next phase. Continue with manual stretching.  -           User Key  (r) = Recorded By, (t) = Taken By, (c) = Cosigned By    Initials Name Provider Type    Edna Salguero, PT DPT Physical Therapist            Other therapeutic activities and/or exercises will be prescribed depending on the patient's progress or lack thereof.     Continue skilled therapy plan of care to meet remaining unmet goals. Therapy to focus on ROM,scap stab, strength, posture, all within MD protocol to ensure functional use of the UE for ADL/IADL use.        Modalities     Row Name 05/16/23 1500             Ice    Ice Applied Yes  -AJ      Location L shoulder  -AJ      PT Ice Rx Minutes 10  -AJ      Ice S/P Rx Yes  -AJ            User Key  (r) = Recorded By, (t) = Taken By, (c) = Cosigned By    Initials Name Provider Type    RIAZ  "Edna Wells, PT DPT Physical Therapist               OP Exercises     Row Name 05/16/23 1500             Subjective Comments    Subjective Comments Patient reports her shoulder feels okay, but she still gets pain on/off in upper arm.  -AJ         Subjective Pain    Able to rate subjective pain? yes  -AJ      Pre-Treatment Pain Level 0  -AJ         Exercise 1    Exercise Name 1 Pro II UE Fwd/Retro  -AJ      Time 1 10 min  -AJ      Additional Comments L 1.0  -AJ         Exercise 2    Exercise Name 2 Pulley's 3 way  -AJ      Time 2 3 minutes each  -AJ         Exercise 3    Exercise Name 3 Wall slides  -AJ      Sets 3 2  -AJ      Reps 3 10  -AJ      Time 3 5\" hold  -AJ      Additional Comments alternating  -AJ         Exercise 4    Exercise Name 4 St. wand ER @ 0° abduction  -AJ      Sets 4 2  -AJ      Reps 4 10  -AJ      Time 4 5\" hold  -AJ         Exercise 5    Exercise Name 5 St. wand Ext  -AJ      Sets 5 2  -AJ      Reps 5 10  -AJ      Time 5 5\" hold  -AJ         Exercise 6    Exercise Name 6 St.  wand IR  -AJ      Sets 6 2  -AJ      Reps 6 10  -AJ      Time 6 5\" hold  -AJ         Exercise 7    Exercise Name 7 St. wand ER  -AJ      Sets 7 2  -AJ      Reps 7 10  -AJ      Time 7 5\" hold  -AJ            User Key  (r) = Recorded By, (t) = Taken By, (c) = Cosigned By    Initials Name Provider Type    AJ Edna Wells, PT DPT Physical Therapist              Manual Rx (last 36 hours)     Manual Treatments     Row Name 05/16/23 1500             Manual Rx 1    Manual Rx 1 Location L Shoulder  -AJ      Manual Rx 1 Type PROM in all planes with end of range therapist applied 30\" stretches (x2-3 each plane)  -AJ      Manual Rx 1 Duration 15 min  -AJ            User Key  (r) = Recorded By, (t) = Taken By, (c) = Cosigned By    Initials Name Provider Type    AJ Edna Wells, PT DPT Physical Therapist                All therapeutic interventions performed today were to address current functional " limitations and/or deficits in addressing all physical therapy goals.               Outcome Measure Options: Quick DASH  Quick DASH  Open a tight or new jar.: Mild Difficulty  Do heavy household chores (e.g., wash walls, wash floors): Moderate Difficulty  Carry a shopping bag or briefcase: Mild Difficulty  Wash your back: Moderate Difficulty  Use a knife to cut food: Mild Difficulty  Recreational activities in which you take some force or impact through your arm, should or hand (e.g. golf, hammering, tennis, etc.): Moderate Difficulty  During the past week, to what extent has your arm, shoulder, or hand problem interfered with your normal social activites with family, friends, neighbors or groups?: Slightly  During the past week, were you limited in your work or other regular daily activities as a result of your arm, shoulder or hand problem?: Slightly Limited  Arm, Shoulder, or hand pain: Severe  Tingling (pins and needles) in your arm, shoulder, or hand: None  During the past week, how much difficulty have you had sleeping because of the pain in your arm, shoulder or hand?: Mild Difficulty  Number of Questions Answered: 11  Quick DASH Score: 34.09         Time Calculation:     Start Time: 1505  Untimed Charges  PT Ice Rx Minutes: 10  Total Minutes  Untimed Charges Total Minutes: 10   Total Minutes: 10     Therapy Charges for Today     Code Description Service Date Service Provider Modifiers Qty    81208493164 HC PT THER SUPP EA 15 MIN 5/16/2023 Edna Wells, PT DPT GP 1    44261950529 HC PT MANUAL THERAPY EA 15 MIN 5/16/2023 Edna Wells, PT DPT GP 1    75474739707 HC PT THER PROC EA 15 MIN 5/16/2023 Edna Wells, PT DPT GP 3          PT G-Codes  Outcome Measure Options: Quick DASH  Quick DASH Score: 34.09         This document has been electronically signed by Edna Wells PT DPT, [facility] on May 16, 2023 16:15 CDT

## 2023-05-18 ENCOUNTER — APPOINTMENT (OUTPATIENT)
Dept: PHYSICAL THERAPY | Facility: HOSPITAL | Age: 78
End: 2023-05-18
Payer: MEDICARE

## 2023-05-23 ENCOUNTER — HOSPITAL ENCOUNTER (OUTPATIENT)
Dept: PHYSICAL THERAPY | Facility: HOSPITAL | Age: 78
Setting detail: THERAPIES SERIES
Discharge: HOME | End: 2023-05-23
Payer: MEDICARE

## 2023-05-23 DIAGNOSIS — M75.102 ROTATOR CUFF SYNDROME OF LEFT SHOULDER: Primary | ICD-10-CM

## 2023-05-23 PROCEDURE — 97140 MANUAL THERAPY 1/> REGIONS: CPT | Performed by: PHYSICAL THERAPIST

## 2023-05-23 PROCEDURE — 97110 THERAPEUTIC EXERCISES: CPT | Performed by: PHYSICAL THERAPIST

## 2023-05-23 NOTE — THERAPY TREATMENT NOTE
"    Outpatient Physical Therapy Ortho Treatment Note  [facility]     Patient Name: Sophia Neff  : 1945  MRN: [id number]  Today's Date: 2023      Visit Date: 2023     Patient seen for 14 PT sessions.  Patient reports 50-60\"% of improvement.  Next MD appt: KANE.  Recertification: 2023.     Therapy Diagnosis: S/P L RCR/DCE/ACR/Bicep tedotomoy (DOS2023)    Visit Dx:    ICD-10-CM ICD-9-CM   1. 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    • Plantar fasciitis     Bilateral        Past Surgical History:   Procedure Laterality Date   • APPENDECTOMY  1970   • BREAST SURGERY Bilateral 1992    Reduction   • GALLBLADDER SURGERY  2004   • HYSTERECTOMY     • NECK SURGERY  1989   • PELVIC FLOOR REPAIR  2009   • PELVIC FLOOR REPAIR  2009   • ROTATOR CUFF REPAIR W/ DISTAL CLAVICLE EXCISION Left 2023        PT Ortho     Row Name 23 1300       Precautions and Contraindications    Precautions Large Tear Protocol  -KG       Subjective Pain    Post-Treatment Pain Level 0  -KG       Posture/Observations    Posture/Observations Comments Mnor cues with posture/form with standing therex.  -KG          User Key  (r) = Recorded By, (t) = Taken By, (c) = Cosigned By    Initials Name Provider Type    Roseline Major, PT Physical Therapist          " "                   PT Assessment/Plan     Row Name 05/23/23 1300          PT Assessment    Assessment Comments Pt with minimal guarding with passive stretching, tolerated well. No issues progressing to supine active activities. Cues to keep elbow/wrist neutral with supine circles/pendulums but otherwise did well. Performed bilaterally at same time, which helped form.  -KG        PT Plan    PT Frequency 2x/week  -KG     PT Plan Comments Continue progressing AROM activities. Work towards shoulder isometrics.  -KG           User Key  (r) = Recorded By, (t) = Taken By, (c) = Cosigned By    Initials Name Provider Type    KG Roseline Hurtado, PT Physical Therapist                   OP Exercises     Row Name 05/23/23 1300             Subjective Comments    Subjective Comments States shoulder is doing ok today. Has been sore over the past couple of days. states \"i was probably moving and using more than I should've been\"  -KG         Subjective Pain    Able to rate subjective pain? yes  -KG      Pre-Treatment Pain Level 0  -KG      Post-Treatment Pain Level 0  -KG         Exercise 1    Exercise Name 1 Pro II UE Fwd/Retro  -KG      Time 1 10 min  -KG      Additional Comments L 1.0  -KG         Exercise 2    Exercise Name 2 Pulley's 3 way  -KG      Time 2 3 minutes each  -KG         Exercise 3    Exercise Name 3 Wall slides flex/scation  -KG      Sets 3 2  -KG      Reps 3 10  -KG      Time 3 5\" hold  -KG      Additional Comments alternating  -KG         Exercise 4    Exercise Name 4 Standing tband bernardo shoulder ext/scap retraction  -KG      Cueing 4 Verbal  -KG      Sets 4 2  -KG      Reps 4 10  -KG      Additional Comments red  -KG         Exercise 5    Exercise Name 5 standing tband mid rows  -KG      Cueing 5 Verbal  -KG      Sets 5 2  -KG      Reps 5 10  -KG      Additional Comments red  -KG         Exercise 6    Exercise Name 6 Standing AA wand scaption  -KG      Cueing 6 Verbal  -KG      Sets 6 1  -KG      Reps 6 15  " -KG         Exercise 7    Exercise Name 7 Standing L bicep curls palm up  -KG      Cueing 7 Verbal  -KG      Sets 7 2  -KG      Reps 7 10  -KG      Additional Comments 1#  -KG         Exercise 8    Exercise Name 8 Semi-reclined bernardo shoulder circles CW/CCW at ~90 deg flex  -KG      Cueing 8 Verbal  -KG      Sets 8 1  -KG      Reps 8 20 ea direction  -KG         Exercise 9    Exercise Name 9 Semi-reclined bernardo shoulder flex AROM to ~100 deg  -KG      Cueing 9 Verbal  -KG      Sets 9 2  -KG      Reps 9 10  -KG            User Key  (r) = Recorded By, (t) = Taken By, (c) = Cosigned By    Initials Name Provider Type    KG Roseline Hurtado, PT Physical Therapist                         Manual Rx (last 36 hours)     Manual Treatments     Row Name 05/23/23 1300             Manual Rx 1    Manual Rx 1 Location L Shoulder  -KG      Manual Rx 1 Type PROM all planes with end range stretching  -KG      Manual Rx 1 Duration 10 min  -KG            User Key  (r) = Recorded By, (t) = Taken By, (c) = Cosigned By    Initials Name Provider Type    KG Roseline Hurtado, PT Physical Therapist                 PT OP Goals     Row Name 05/23/23 1300          PT Short Term Goals    STG 1 I with HEP and have additions/changes by next re-certification.  -KG     STG 1 Progress Met  -KG     STG 2 Patient to be more aware of posture and posture correction technique.  -KG     STG 2 Progress Partially Met;Ongoing;Progressing  -KG     STG 3 Patient to be compliant with sling wear per MD protocol.  -KG     STG 3 Progress Met  -KG     STG 4 PROM L Shoulder flexion/abduction >=145°.  -KG     STG 4 Progress Partially Met;Ongoing  -KG     STG 5 PROM L shoulder IR >= 55° at 90° of shoulder abduction.  -KG     STG 5 Progress Ongoing;Progressing  -KG     STG 6 L  at the #2 setting within 10# of the contralateral side with average in 3 attempts.  -KG     STG 6 Progress Ongoing;Progressing  -KG     STG 7 AAROM L Shoulder flexion/abduction >=125° in  standing..  -KG     STG 7 Progress Ongoing;Progressing  -KG        Long Term Goals    LTG 1 Patient to have full PROM for the L shoulder in all planes.  -KG     LTG 1 Progress Ongoing;Progressing  -KG     LTG 2 Patient to have full L shoulder AAROM with 3-way pulley's.  -KG     LTG 2 Progress Partially Met;Ongoing;Progressing  -KG     LTG 3 AROM L Shoulder flexion/abduction >=155°.  -KG     LTG 4 AROM L shoulder IR >= 55° at 90° of shoulder abduction.  -KG     LTG 5 AROM L shoulder ER >= 75° at 90° of shoulder abduction.  -KG     LTG 6 L UE >= 4+/5.  -KG     LTG 7 Patient able to perform 3 minutes of OH activities with no increase in pain.  -KG     LTG 8 I with final HEP.  -KG        Time Calculation    PT Goal Re-Cert Due Date 06/06/23  -KG           User Key  (r) = Recorded By, (t) = Taken By, (c) = Cosigned By    Initials Name Provider Type    KG Roseline Hurtado, PT Physical Therapist                Therapy Education  Given: HEP, Symptoms/condition management, Pain management, Posture/body mechanics  Program: Reinforced  How Provided: Verbal, Demonstration  Provided to: Patient  Level of Understanding: Verbalized, Demonstrated              Time Calculation:   Start Time: 1302  Stop Time: 1351  Time Calculation (min): 49 min  Therapy Charges for Today     Code Description Service Date Service Provider Modifiers Qty    23372412684  PT THER PROC EA 15 MIN 5/23/2023 Roseline Hurtado, PT GP 2    27978736030  PT MANUAL THERAPY EA 15 MIN 5/23/2023 Roseline Hurtado, PT GP 1                    Roseline Hurtado, PT  5/23/2023

## 2023-05-25 ENCOUNTER — HOSPITAL ENCOUNTER (OUTPATIENT)
Dept: PHYSICAL THERAPY | Facility: HOSPITAL | Age: 78
Setting detail: THERAPIES SERIES
Discharge: HOME | End: 2023-05-25
Payer: MEDICARE

## 2023-05-25 DIAGNOSIS — G89.29 CHRONIC LEFT SHOULDER PAIN: ICD-10-CM

## 2023-05-25 DIAGNOSIS — M25.512 CHRONIC LEFT SHOULDER PAIN: ICD-10-CM

## 2023-05-25 DIAGNOSIS — M19.012 PRIMARY OSTEOARTHRITIS OF LEFT SHOULDER: ICD-10-CM

## 2023-05-25 DIAGNOSIS — M75.102 ROTATOR CUFF SYNDROME OF LEFT SHOULDER: Primary | ICD-10-CM

## 2023-05-25 DIAGNOSIS — M75.112 NONTRAUMATIC INCOMPLETE TEAR OF LEFT ROTATOR CUFF: ICD-10-CM

## 2023-05-25 PROCEDURE — 97140 MANUAL THERAPY 1/> REGIONS: CPT

## 2023-05-25 PROCEDURE — 97110 THERAPEUTIC EXERCISES: CPT

## 2023-05-25 NOTE — THERAPY TREATMENT NOTE
Outpatient Physical Therapy Ortho Treatment Note  [facility]     Patient Name: Sophia Neff  : 1945  MRN: [id number]  Today's Date: 2023     Pt seen for 15 PT sessions  Reported Improvement:  50-60 %  MD Visit: TBD  Recheck Date: 2023    Therapy Diagnosis:  S/P L RCR/DCE/ACR/Bicep tedotomoy (DOS2023)        Visit Date: 2023    Visit Dx:    ICD-10-CM ICD-9-CM   1. Rotator cuff syndrome of left shoulder  M75.102 726.10   2. Nontraumatic incomplete tear of left rotator cuff  M75.112 726.13   3. Chronic left shoulder pain  M25.512 719.41    G89.29 338.29   4. 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  Added light resistance to Pro II UE bike this date. Good effort with AROM activity in semi reclined position.  Instructed to add low rows and  shoulder extensions to HEP.  -JW        PT Plan    PT Frequency 2x/week  -JW     PT Plan Comments Next visit add SL supine circles @ 90°  -JW           User Key  (r) = Recorded By, (t) = Taken By, (c) = Cosigned By    Initials Name Provider Type    Shital Cadet PTA Physical Therapist Assistant                   OP Exercises     Row Name 05/25/23 1400             Subjective Comments    Subjective Comments Shoulder is feeling good, not sore at all.  Feeling better  -JW         Subjective Pain    Able to rate subjective pain? yes  -JW      Pre-Treatment Pain Level 0  -JW      Post-Treatment Pain Level 0  -JW         Exercise 1    Exercise Name 1 Pro II UE Fwd/Retro  -JW      Time 1 10 min  -JW      Additional Comments L 3.0  -JW         Exercise 2    Exercise Name 2 Pulley's 3 way  -JW      Time 2 3 minutes each direction  -JW      Additional Comments 1# cuff wt  -JW         Exercise 3    Exercise Name 3 Standing Shoulder shrugs  -JW      Reps 3 20  -JW      Time 3 1# wt bilat  -JW         Exercise 4    Exercise Name 4 Tband B shoulder extension  -JW      Sets 4 2  -JW      Reps 4 10  -JW      Additional Comments RTB  -JW         Exercise 5    Exercise Name 5 Tband Rows; Low  -JW      Reps 5 20  -JW      Additional Comments RTB  -JW         Exercise 6    Exercise Name 6 Semi reclined AROM flexion  -JW      Sets 6 2  -JW      Reps 6 10  -JW      Additional Comments 1st table notch  -JW         Exercise 7    Exercise Name 7 SL AROM abduction  -JW      Sets 7 2  -JW      Reps 7 10  -JW         Exercise 8    Exercise Name 8 SL AROM ER  -JW      Sets 8 2  -JW      Reps 8 10  -JW      Additional Comments towel under elbow  -JW         Exercise 9    Exercise Name 9 PROM - manual  -JW            User Key  (r) = Recorded By, (t) = Taken By, (c) = Cosigned By    Initials Name Provider Type    Shital Cadet PTA Physical Therapist Assistant                         Manual Rx (last 36 hours)     Manual Treatments     Row  Name 05/25/23 1500             Manual Rx 1    Manual Rx 1 Location L Shoulder  -      Manual Rx 1 Type PROM all planes with end range stretching  -      Manual Rx 1 Duration 10 min  -            User Key  (r) = Recorded By, (t) = Taken By, (c) = Cosigned By    Initials Name Provider Type    HARVINDER Shital Booker PTA Physical Therapist Assistant                 PT OP Goals     Row Name 05/25/23 1500 05/25/23 1400       PT Short Term Goals    STG 1 -- I with HEP and have additions/changes by next re-certification.  -    STG 1 Progress -- Met  -    STG 2 -- Patient to be more aware of posture and posture correction technique.  -    STG 2 Progress -- Partially Met;Ongoing;Progressing  -    STG 3 -- Patient to be compliant with sling wear per MD protocol.  -    STG 3 Progress -- Met  -    STG 4 -- PROM L Shoulder flexion/abduction >=145°.  -    STG 4 Progress -- Partially Met;Ongoing  -    STG 5 -- PROM L shoulder IR >= 55° at 90° of shoulder abduction.  -    STG 5 Progress -- Ongoing;Progressing  -    STG 6 -- L  at the #2 setting within 10# of the contralateral side with average in 3 attempts.  -    STG 6 Progress -- Ongoing;Progressing  -    STG 7 -- AAROM L Shoulder flexion/abduction >=125° in standing..  -    STG 7 Progress -- Ongoing;Progressing  -       Long Term Goals    LTG 1 -- Patient to have full PROM for the L shoulder in all planes.  -    LTG 1 Progress -- Ongoing;Progressing  -    LTG 2 -- Patient to have full L shoulder AAROM with 3-way pulley's.  -    LTG 2 Progress -- Partially Met;Ongoing;Progressing  -    LTG 3 -- AROM L Shoulder flexion/abduction >=155°.  -    LTG 4 -- AROM L shoulder IR >= 55° at 90° of shoulder abduction.  -    LTG 5 -- AROM L shoulder ER >= 75° at 90° of shoulder abduction.  -    LTG 6 -- L UE >= 4+/5.  -    LTG 7 -- Patient able to perform 3 minutes of OH activities with no increase in pain.  -    LTG 8 -- I with final HEP.   -HARVINDER       Time Calculation    PT Goal Re-Cert Due Date 06/06/23  -HARVINDER --          User Key  (r) = Recorded By, (t) = Taken By, (c) = Cosigned By    Initials Name Provider Type    Shital Cadet PTA Physical Therapist Assistant                Therapy Education  Education Details: low rows, shoulder ext. red tband  Given: HEP, Posture/body mechanics, Symptoms/condition management  Program: New, Reinforced  How Provided: Verbal, Demonstration, Written  Provided to: Patient  Level of Understanding: Verbalized, Demonstrated              Time Calculation:   Start Time: 1417  Stop Time: 1515  Time Calculation (min): 58 min  Therapy Charges for Today     Code Description Service Date Service Provider Modifiers Qty    73130794746 HC PT MANUAL THERAPY EA 15 MIN 5/25/2023 Shital Booker PTA GP, CQ 1    69981056830 HC PT THER SUPP EA 15 MIN 5/25/2023 Shital Booker PTA GP, CQ 1    03072684206 HC PT THER PROC EA 15 MIN 5/25/2023 Shital Booker PTA GP, CQ 3                    Shital Booker PTA  5/25/2023

## 2023-06-01 ENCOUNTER — HOSPITAL ENCOUNTER (OUTPATIENT)
Dept: PHYSICAL THERAPY | Facility: HOSPITAL | Age: 78
Setting detail: THERAPIES SERIES
Discharge: HOME | End: 2023-06-01
Payer: MEDICARE

## 2023-06-01 DIAGNOSIS — G89.29 CHRONIC LEFT SHOULDER PAIN: ICD-10-CM

## 2023-06-01 DIAGNOSIS — M75.112 NONTRAUMATIC INCOMPLETE TEAR OF LEFT ROTATOR CUFF: ICD-10-CM

## 2023-06-01 DIAGNOSIS — M19.012 PRIMARY OSTEOARTHRITIS OF LEFT SHOULDER: ICD-10-CM

## 2023-06-01 DIAGNOSIS — M25.512 CHRONIC LEFT SHOULDER PAIN: ICD-10-CM

## 2023-06-01 DIAGNOSIS — M75.102 ROTATOR CUFF SYNDROME OF LEFT SHOULDER: Primary | ICD-10-CM

## 2023-06-01 PROCEDURE — 97110 THERAPEUTIC EXERCISES: CPT

## 2023-06-01 PROCEDURE — 97140 MANUAL THERAPY 1/> REGIONS: CPT

## 2023-06-01 NOTE — THERAPY TREATMENT NOTE
Outpatient Physical Therapy Ortho Treatment Note  [facility]     Patient Name: Sophia Neff  : 1945  MRN: [id number]  Today's Date: 2023     Pt seen for 16 PT sessions  Reported Improvement:  70-80 %  MD Visit: 2023  Recheck Date: 2023    Therapy Diagnosis:   S/P L RCR/DCE/ACR/Bicep tedotomoy (DOS2023)        Visit Date: 2023    Visit Dx:    ICD-10-CM ICD-9-CM   1. Rotator cuff syndrome of left shoulder  M75.102 726.10   2. Nontraumatic incomplete tear of left rotator cuff  M75.112 726.13   3. Chronic left shoulder pain  M25.512 719.41    G89.29 338.29   4. 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  Does not reports any new c/o or issues.  Reports compliance with HEP.  Has tendency to fall asleep during use of Pulley's .  Less guarding  with PROM.  Ice to go post tx session.  Pt reports no pain just a little tender. Pt very sleepy during tx session, deferrred AROM modifed to manual for better quality of movments.  -        PT Plan    PT Frequency 2x/week  -     PT Plan Comments Next visit add supine and SL circles.  -           User Key  (r) = Recorded By, (t) = Taken By, (c) = Cosigned By    Initials Name Provider Type    Shital Cadet PTA Physical Therapist Assistant                 Modalities     Row Name 06/01/23 1400             Subjective Pain    Post-Treatment Pain Level 0  -            User Key  (r) = Recorded By, (t) = Taken By, (c) = Cosigned By    Initials Name Provider Type    Shital Cadet PTA Physical Therapist Assistant               OP Exercises     Row Name 06/01/23 1400             Subjective Comments    Subjective Comments Woke up with my arm up over my head, no pain or soreness, just a little tender  -         Subjective Pain    Able to rate subjective pain? yes  -      Pre-Treatment Pain Level 0  -      Post-Treatment Pain Level 0  -         Exercise 1    Exercise Name 1 Pro II UE Fwd/Retro  -      Time 1 10 min  -      Additional Comments L 4.0  -         Exercise 2    Exercise Name 2 Pulley's 3 way  -      Time 2 3 minutes each direction  -      Additional Comments 2# cuff  -         Exercise 3    Exercise Name 3 Standing Shoulder shrugs  -            User Key  (r) = Recorded By, (t) = Taken By, (c) = Cosigned By    Initials Name Provider Type    Shital Cadet PTA Physical Therapist Assistant                              PT OP Goals     Row Name 06/01/23 1400 06/01/23 1300       PT Short Term Goals    STG 1 -- I with HEP and have additions/changes by next re-certification.  -    STG 1 Progress -- Met  -    STG 2 -- Patient to be more aware of posture and posture correction technique.  -    STG 2 Progress -- Met;Ongoing;Progressing  -    STG 3 -- Patient to be compliant with  sling wear per MD protocol.  -    STG 3 Progress -- Met  -    STG 4 -- PROM L Shoulder flexion/abduction >=145°.  -    STG 4 Progress -- Partially Met;Ongoing  -    STG 5 -- PROM L shoulder IR >= 55° at 90° of shoulder abduction.  -    STG 5 Progress -- Ongoing;Progressing  -    STG 6 -- L  at the #2 setting within 10# of the contralateral side with average in 3 attempts.  -    STG 6 Progress -- Ongoing;Progressing  -    STG 7 -- AAROM L Shoulder flexion/abduction >=125° in standing..  -    STG 7 Progress -- Ongoing;Progressing  -       Long Term Goals    LTG 1 -- Patient to have full PROM for the L shoulder in all planes.  -    LTG 1 Progress -- Ongoing;Progressing  -    LTG 2 -- Patient to have full L shoulder AAROM with 3-way pulley's.  -    LTG 2 Progress -- Partially Met;Ongoing;Progressing  -    LTG 3 -- AROM L Shoulder flexion/abduction >=155°.  -    LTG 4 -- AROM L shoulder IR >= 55° at 90° of shoulder abduction.  -    LTG 5 -- AROM L shoulder ER >= 75° at 90° of shoulder abduction.  -    LTG 6 -- L UE >= 4+/5.  -    LTG 7 -- Patient able to perform 3 minutes of OH activities with no increase in pain.  -    LTG 8 -- I with final HEP.  -       Time Calculation    PT Goal Re-Cert Due Date 06/06/23  - --          User Key  (r) = Recorded By, (t) = Taken By, (c) = Cosigned By    Initials Name Provider Type    Shital Cadet PTA Physical Therapist Assistant                               Time Calculation:   Start Time: 1354  Stop Time: 1439  Time Calculation (min): 45 min  Therapy Charges for Today     Code Description Service Date Service Provider Modifiers Qty    58486900630 HC PT THER PROC EA 15 MIN 6/1/2023 Shital Booker PTA GP, CQ 2    52263244778 HC PT MANUAL THERAPY EA 15 MIN 6/1/2023 Shital Booker PTA GP, CQ 1    6194492 HC PT THER SUPP EA 15 MIN 6/1/2023 Shital Booker PTA GP, CQ 1                    Shital Booker PTA  6/1/2023

## 2023-06-06 ENCOUNTER — HOSPITAL ENCOUNTER (OUTPATIENT)
Dept: PHYSICAL THERAPY | Facility: HOSPITAL | Age: 78
Setting detail: THERAPIES SERIES
Discharge: HOME | End: 2023-06-06
Payer: MEDICARE

## 2023-06-06 DIAGNOSIS — M75.102 ROTATOR CUFF SYNDROME OF LEFT SHOULDER: Primary | ICD-10-CM

## 2023-06-06 DIAGNOSIS — M25.512 CHRONIC LEFT SHOULDER PAIN: ICD-10-CM

## 2023-06-06 DIAGNOSIS — M19.012 PRIMARY OSTEOARTHRITIS OF LEFT SHOULDER: ICD-10-CM

## 2023-06-06 DIAGNOSIS — M75.112 NONTRAUMATIC INCOMPLETE TEAR OF LEFT ROTATOR CUFF: ICD-10-CM

## 2023-06-06 DIAGNOSIS — G89.29 CHRONIC LEFT SHOULDER PAIN: ICD-10-CM

## 2023-06-06 PROCEDURE — 97140 MANUAL THERAPY 1/> REGIONS: CPT

## 2023-06-06 PROCEDURE — 97110 THERAPEUTIC EXERCISES: CPT

## 2023-06-06 NOTE — THERAPY TREATMENT NOTE
Outpatient Physical Therapy Ortho Treatment Note  [facility]     Patient Name: Sophia Neff  : 1945  MRN: [id number]  Today's Date: 2023    Pt seen for 17 PT sessions  Reported Improvement:  70-80 %  MD Visit: 2023  Recheck Date: 2023    Therapy Diagnosis:  S/P L RCR/DCE/ACR/Bicep tedotomoy (DOS2023)         Visit Date: 2023    Visit Dx:    ICD-10-CM ICD-9-CM   1. Rotator cuff syndrome of left shoulder  M75.102 726.10   2. Nontraumatic incomplete tear of left rotator cuff  M75.112 726.13   3. Chronic left shoulder pain  M25.512 719.41    G89.29 338.29   4. 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     Plantar fasciitis     Bilateral        Past Surgical History:   Procedure Laterality Date    APPENDECTOMY  1970    BREAST SURGERY Bilateral 1992    Reduction    GALLBLADDER SURGERY  2004    HYSTERECTOMY  1990    NECK SURGERY  1989    PELVIC FLOOR REPAIR  2009    PELVIC FLOOR REPAIR  2009    ROTATOR CUFF REPAIR W/ DISTAL CLAVICLE EXCISION Left 2023        PT Ortho       Row Name 23 1300        Strength Left    # Reps 3  -JW    Left Rung 2  -JW    Left  Test 1 18  -JW    Left  Test 2 20  -JW    Left  Test 3 20  -JW     Strength Average Left 19.33  -JW              User Key  (r) = Recorded By, (t) = Taken By, (c) = Cosigned By       "Initials Name Provider Type    Shital Cadet PTA Physical Therapist Assistant                                 PT Assessment/Plan       Row Name 06/06/23 1400          PT Assessment    Assessment Comments Pt with improved PROM this date.  Has met most STG at this time.  States she ia ble to use the arm more but it does get sore.  Pt tends to fall asleep during repetitive motions and activity is supine/SL.   strength assesed with little to no impovement . pt states \"that is as good as it will get\"  Using her ball more thn putty.  Education provided to increase her strength to use the putyy.  No pain post tx  -JW        PT Plan    PT Frequency 2x/week  -JW     PT Plan Comments Next visit attempt standing AROM  -JW               User Key  (r) = Recorded By, (t) = Taken By, (c) = Cosigned By      Initials Name Provider Type    Shital Cadet PTA Physical Therapist Assistant                     Modalities       Row Name 06/06/23 1300             Subjective Pain    Post-Treatment Pain Level 0  -                User Key  (r) = Recorded By, (t) = Taken By, (c) = Cosigned By      Initials Name Provider Type    Shital Cadet PTA Physical Therapist Assistant                   OP Exercises       Row Name 06/06/23 1300             Subjective Comments    Subjective Comments Shoulder is a little more sore today, using it more and that makes it sore  -         Subjective Pain    Able to rate subjective pain? yes  -JW      Pre-Treatment Pain Level 2  -JW      Post-Treatment Pain Level 0  -JW         Exercise 1    Exercise Name 1 Pro II UE Fwd/Retro  -JW      Time 1 10 min  -JW      Additional Comments L 5.0  -JW         Exercise 2    Exercise Name 2 Pulley's 3 way  -JW      Time 2 2 min ea direction  -JW      Additional Comments 2# cuff wt  -JW         Exercise 3    Exercise Name 3  assessment  -JW         Exercise 4    Exercise Name 4 PROM  -JW         Exercise 5    Exercise Name 5 Semi reclined fwd flexion "  -JW      Reps 5 20  -JW         Exercise 6    Exercise Name 6 Supine circles CW/CCW  -JW      Reps 6 20 ea direction  -JW      Time 6 sm/lg  -JW         Exercise 7    Exercise Name 7 SL Abduction  -JW      Reps 7 20  -JW         Exercise 8    Exercise Name 8 SL sircles @90° abduction CW/CCW  -JW      Reps 8 20 ea direction ea size  -JW      Time 8 sm/lg  -JW         Exercise 9    Exercise Name 9 ice to go  -JW                User Key  (r) = Recorded By, (t) = Taken By, (c) = Cosigned By      Initials Name Provider Type    Shital Cadet PTA Physical Therapist Assistant                             Manual Rx (last 36 hours)       Manual Treatments       Row Name 06/06/23 1300             Manual Rx 1    Manual Rx 1 Location L Shoulder  -JW      Manual Rx 1 Type PROM all planes with end range stretching  -JW      Manual Rx 1 Grade with measurements  -JW      Manual Rx 1 Duration 8 min  -JW                User Key  (r) = Recorded By, (t) = Taken By, (c) = Cosigned By      Initials Name Provider Type    Shital Cadet PTA Physical Therapist Assistant                     PT OP Goals       Row Name 06/06/23 1300          PT Short Term Goals    STG 1 I with HEP and have additions/changes by next re-certification.  -     STG 1 Progress Met  -     STG 2 Patient to be more aware of posture and posture correction technique.  -     STG 2 Progress Met;Ongoing;Progressing  -     STG 3 Patient to be compliant with sling wear per MD protocol.  -     STG 3 Progress Met  -     STG 4 PROM L Shoulder flexion/abduction >=145°.  -     STG 4 Progress Met  -     STG 5 PROM L shoulder IR >= 55° at 90° of shoulder abduction.  -     STG 5 Progress Met  -     STG 6 L  at the #2 setting within 10# of the contralateral side with average in 3 attempts.  -     STG 6 Progress Not Met  -     STG 7 AAROM L Shoulder flexion/abduction >=125° in standing..  -     STG 7 Progress Ongoing;Progressing  -JW        Long  Term Goals    LTG 1 Patient to have full PROM for the L shoulder in all planes.  -     LTG 1 Progress Met  -     LTG 2 Patient to have full L shoulder AAROM with 3-way pulley's.  -     LTG 2 Progress [facility]/abduction >=155°.  -     LTG 4 AROM L shoulder IR >= 55° at 90° of shoulder abduction.  -     LTG 5 AROM L shoulder ER >= 75° at 90° of shoulder abduction.  -     LTG 6 L UE >= 4+/5.  -     LTG 7 Patient able to perform 3 minutes of OH activities with no increase in pain.  [facility].  -        Time Calculation    PT Goal Re-Cert Due Date 06/06/23  -               User Key  (r) = Recorded By, (t) = Taken By, (c) = Cosigned By      Initials Name Provider Type    Shital Cadet PTA Physical Therapist Assistant                                   Time Calculation:   Start Time: 1300  Stop Time: 1350  Time Calculation (min): 50 min  Therapy Charges for Today       Code Description Service Date Service Provider Modifiers Qty    09782687393 HC PT THER PROC EA 15 MIN 6/6/2023 Shital Booker PTA GP, CQ 2    94267074755 HC PT MANUAL THERAPY EA 15 MIN 6/6/2023 Shital Booker PTA GP, CQ 1    65723687424 HC PT THER SUPP EA 15 MIN 6/6/2023 Shital Booker PTA GP, CQ 1                      Shital Booker PTA  6/6/2023

## 2023-06-08 ENCOUNTER — HOSPITAL ENCOUNTER (OUTPATIENT)
Dept: PHYSICAL THERAPY | Facility: HOSPITAL | Age: 78
Setting detail: THERAPIES SERIES
Discharge: HOME | End: 2023-06-08
Payer: MEDICARE

## 2023-06-08 DIAGNOSIS — M25.512 CHRONIC LEFT SHOULDER PAIN: ICD-10-CM

## 2023-06-08 DIAGNOSIS — M75.112 NONTRAUMATIC INCOMPLETE TEAR OF LEFT ROTATOR CUFF: ICD-10-CM

## 2023-06-08 DIAGNOSIS — M75.102 ROTATOR CUFF SYNDROME OF LEFT SHOULDER: Primary | ICD-10-CM

## 2023-06-08 DIAGNOSIS — G89.29 CHRONIC LEFT SHOULDER PAIN: ICD-10-CM

## 2023-06-08 DIAGNOSIS — M19.012 PRIMARY OSTEOARTHRITIS OF LEFT SHOULDER: ICD-10-CM

## 2023-06-08 PROCEDURE — 97530 THERAPEUTIC ACTIVITIES: CPT | Performed by: PHYSICAL THERAPIST

## 2023-06-08 PROCEDURE — 97110 THERAPEUTIC EXERCISES: CPT | Performed by: PHYSICAL THERAPIST

## 2023-06-08 NOTE — THERAPY PROGRESS REPORT/RE-CERT
Outpatient Physical Therapy Ortho Progress Note  [facility]     Patient Name: Sophia Neff  : 1945  MRN: [id number]  Today's Date: 2023      Visit Date: 2023    Patient seen for 18 PT sessions.  Patient reports 80-90% of improvement.  Next MD appt: 2023.  Recertification: 2023.    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 Rotator cuff syndrome of left shoulder  M75.102 726.10   2. Nontraumatic incomplete tear of left rotator cuff  M75.112 726.13   3. Chronic left shoulder pain  M25.512 719.41    G89.29 338.29   4. Primary osteoarthritis of left shoulder  M19.012 715.11              PT Ortho       Row Name 23 1500       Precautions and Contraindications    Precautions Large Tear Protocol  -AJ       Subjective Pain    Post-Treatment Pain Level 0  -AJ       Posture/Observations    Alignment Options Forward head;Thoracic kyphosis;Rounded shoulders  -AJ     Forward Head Moderate;Increased  -AJ    Thoracic Kyphosis Moderate;Increased  -AJ    Rounded Shoulders Bilateral:;Moderate;Increased  -AJ    Posture/Observations Comments No distress, cues for posture on/off throughout treatment.  -AJ       Left Upper Ext    Lt Shoulder Abduction AROM 130°  -AJ    Lt Shoulder Abduction PROM 142°  -AJ    Lt Shoulder Flexion AROM 120°  -AJ    Lt Shoulder Flexion PROM 155°  -AJ    Lt Shoulder External Rotation AROM 70° @ 90° of shoulder abduction  -AJ    Lt Shoulder Internal Rotation AROM 50° @ 90° of shoulder abduction  -AJ    Lt Upper Extremity Comments  AAROM in standing with wand for L shoulder: flexion 130°, abduction 129°  -AJ       MMT (Manual Muscle Testing)    General MMT Comments Deferred due to post status.  -AJ       Sensation    Sensation WNL? WFL  -AJ    Light Touch No apparent deficits  -AJ              User Key  (r) = Recorded By, (t) = Taken By, (c) = Cosigned By      Initials Name Provider Type    Edna Salguero, PT DPT Physical Therapist                                Therapy Education  Education Details: HEP: issed red putty to help improve L .  Given: HEP, Posture/body mechanics, Symptoms/condition management  Program: Reinforced, Progressed  How Provided: Verbal, Demonstration  Provided to: Patient  Level of Understanding: Verbalized, Demonstrated      PT OP Goals       Row Name 06/08/23 1500          PT Short Term Goals    STG 1 I with HEP and have additions/changes by next re-certification.  -     STG 1 Progress Met  -     STG 2 Patient to be more aware of posture and posture correction technique.  -     STG 2 Progress Partially Met  -     STG 2 Progress Comments Requires a lot of cueing  -     STG 3 Patient to be compliant with sling wear per MD protocol.  -     STG 3 Progress Met  -     STG 4 PROM L Shoulder flexion/abduction >=145°.  -     STG 4 Progress Met  -     STG 5 PROM L shoulder IR >= 55° at 90° of shoulder  abduction.  -     STG 5 Progress Met  -     STG 6 L  at the #2 setting within 10# of the contralateral side with average in 3 attempts.  -     STG 6 Progress Not Met  -     STG 7 AAROM L Shoulder flexion/abduction >=125° in standing..  -     STG 7 Progress Met  -        Long Term Goals    LTG 1 Patient to have full PROM for the L shoulder in all planes.  -     LTG 1 Progress Met  -     LTG 2 Patient to have full L shoulder AAROM with 3-way pulley's.  -     LTG 2 Progress Met  -     LTG 3 AROM L Shoulder flexion/abduction >=155°.  -AJ     LTG 3 Progress Ongoing;Progressing  -     LTG 4 AROM L shoulder IR >= 55° at 90° of shoulder abduction.  -AJ     LTG 4 Progress Ongoing;Progressing  -     LTG 5 AROM L shoulder ER >= 75° at 90° of shoulder abduction.  -     LTG 5 Progress Met  -     LTG 6 L UE >= 4+/5.  -     LTG 6 Progress Ongoing  -     LTG 7 Patient able to perform 3 minutes of OH activities with no increase in pain.  -     LTG 8 I with final HEP.  -        Time Calculation    PT Goal Re-Cert Due Date 06/29/23  -               User Key  (r) = Recorded By, (t) = Taken By, (c) = Cosigned By      Initials Name Provider Type    Edna Salguero, PT DPT Physical Therapist                  Barriers to Rehab: Include significant or possible arthritic/degenerative changes that have occurred within the joint, The chronicity of this issue.     Safety Issues: None noted.      PT Assessment/Plan       Row Name 06/08/23 1500          PT Assessment    Functional Limitations Limitation in home management;Limitations in community activities;Performance in leisure activities;Performance in self-care ADL  -     Impairments Impaired muscle endurance;Impaired muscle power;Impaired flexibility;Joint integrity;Joint mobility;Pain;Posture;Range of motion;Impaired muscle length;Impaired postural alignment;Muscle strength  -     Assessment Comments 11 weeks post op to date. Progressed  with AROM today and did well. patient still requires a lot of cueing for posture and upright posture. Does have agr related spinal changes, but could improve posture overall. Met most STGs and even a few LTS. Still has deficits in AROM and strength as strength training is deferred  still per protocol. This limits the patient's ability to perform ADLs/IADLS such as cooking, lifting, and other household chores. Skilled PT will contonue to address these issues.  -AJ     Rehab Potential Fair  -AJ     Patient/caregiver participated in establishment of treatment plan and goals Yes  -AJ     Patient would benefit from skilled therapy intervention Yes  -AJ        PT Plan    PT Frequency 2x/week  -AJ     Predicted Duration of Therapy Intervention (PT) 10-12 more visits  cannot begin strength until 16 weeks post op  -AJ     PT Plan Comments Progress standing AROM and begin swall push ups at 12 weeks per protocol.  -AJ               User Key  (r) = Recorded By, (t) = Taken By, (c) = Cosigned By      Initials Name Provider Type    Edna Salguero, PT DPT Physical Therapist                Other therapeutic activities and/or exercises will be prescribed depending on the patient's progress or lack thereof.       Modalities       Row Name 06/08/23 1500             Ice    Ice Applied Yes  -AJ      Location L shoulder  -AJ      PT Ice Rx Minutes 10  -AJ      Ice S/P Rx Yes  -AJ                User Key  (r) = Recorded By, (t) = Taken By, (c) = Cosigned By      Initials Name Provider Type    Edna Salguero, PT DPT Physical Therapist                   OP Exercises       Row Name 06/08/23 1500             Subjective Comments    Subjective Comments Patient rpeorts she is just really sore, no pain.  -AJ         Subjective Pain    Able to rate subjective pain? yes  -AJ      Pre-Treatment Pain Level 0  -AJ      Post-Treatment Pain Level 0  -AJ         Exercise 1    Exercise Name 1 Pro II UE Fwd/Retro  -AJ      Time 1 10 min   -AJ      Additional Comments L 5.0  -AJ         Exercise 2    Exercise Name 2 Pulley's 3 way  -AJ      Time 2 2 min ea direction  -AJ      Additional Comments 2# cuff weight  -AJ         Exercise 3    Exercise Name 3 Putty:   -AJ      Time 3 3 min  -AJ      Additional Comments red  -AJ         Exercise 4    Exercise Name 4 AROM: flex/scap/abd  -AJ      Sets 4 1  -AJ      Reps 4 10 each  -AJ         Exercise 5    Exercise Name 5 AROm standing ER  -AJ      Sets 5 1  -AJ      Reps 5 10  -AJ      Additional Comments 90° abduction  -AJ         Exercise 6    Exercise Name 6 Arm circles @90° abduction  -AJ      Sets 6 2  -AJ      Reps 6 10 each  -AJ      Additional Comments dinner plate size  -AJ         Exercise 7    Exercise Name 7 Arm circles @90° flexion  -AJ      Sets 7 2  -AJ      Reps 7 10  -AJ      Additional Comments dinner plate size  -AJ         Exercise 8    Exercise Name 8 Tband Rows: Low, Mid  -AJ      Sets 8 2  -AJ      Reps 8 10 each  -AJ      Additional Comments GTB  -AJ         Exercise 9    Exercise Name 9 measurements  -AJ         Exercise 10    Exercise Name 10 Discussion of protocol and POC.  -AJ                User Key  (r) = Recorded By, (t) = Taken By, (c) = Cosigned By      Initials Name Provider Type    Edna Salguero, PT DPT Physical Therapist                   All therapeutic interventions performed today were to address current functional limitations and/or deficits in addressing all physical therapy goals.                 Outcome Measure Options: Quick DASH  Quick DASH  Open a tight or new jar.: No Difficulty  Do heavy household chores (e.g., wash walls, wash floors): No Difficulty  Carry a shopping bag or briefcase: Mild Difficulty  Wash your back: Mild Difficulty  Use a knife to cut food: No Difficulty  Recreational activities in which you take some force or impact through your arm, should or hand (e.g. golf, hammering, tennis, etc.): No Difficulty  During the past week, to  what extent has your arm, shoulder, or hand problem interfered with your normal social activites with family, friends, neighbors or groups?: Not at all  During the past week, were you limited in your work or other regular daily activities as a result of your arm, shoulder or hand problem?: Not limited at all  Arm, Shoulder, or hand pain: Mild  Tingling (pins and needles) in your arm, shoulder, or hand: None  During the past week, how much difficulty have you had sleeping because of the pain in your arm, shoulder or hand?: No difficulty  Number of Questions Answered: 11  Quick DASH Score: 6.82         Time Calculation:     Start Time: 1515  Stop Time: 1619  Time Calculation (min): 64 min  PT Non-Billable Time (min): 10 min  Total Timed Code Minutes- PT: 54 minute(s)  Untimed Charges  PT Ice Rx Minutes: 10  Total Minutes  Untimed Charges Total Minutes: 10   Total Minutes: 10     Therapy Charges for Today       Code Description Service Date Service Provider Modifiers Qty    15114827002 HC PT THER SUPP EA 15 MIN 6/8/2023 Edna Wells, PT DPT GP 1    74533197598  PT THERAPEUTIC ACT EA 15 MIN 6/8/2023 Edna Wells, PT DPT GP 1    [id number]  PT THER PROC EA 15 MIN 6/8/2023 Edna Wells, PT DPT GP 3            PT G-Codes  Outcome Measure Options: Quick DASH  Quick DASH Score: 6.82         This document has been electronically signed by Edna Wells PT DPT, [facility] on June 8, 2023 17:05 CDT

## 2023-06-13 ENCOUNTER — HOSPITAL ENCOUNTER (OUTPATIENT)
Dept: PHYSICAL THERAPY | Facility: HOSPITAL | Age: 78
Setting detail: THERAPIES SERIES
Discharge: HOME | End: 2023-06-13
Payer: MEDICARE

## 2023-06-13 DIAGNOSIS — M75.112 NONTRAUMATIC INCOMPLETE TEAR OF LEFT ROTATOR CUFF: ICD-10-CM

## 2023-06-13 DIAGNOSIS — G89.29 CHRONIC LEFT SHOULDER PAIN: ICD-10-CM

## 2023-06-13 DIAGNOSIS — M25.512 CHRONIC LEFT SHOULDER PAIN: ICD-10-CM

## 2023-06-13 DIAGNOSIS — M75.102 ROTATOR CUFF SYNDROME OF LEFT SHOULDER: Primary | ICD-10-CM

## 2023-06-13 DIAGNOSIS — M19.012 PRIMARY OSTEOARTHRITIS OF LEFT SHOULDER: ICD-10-CM

## 2023-06-13 PROCEDURE — 97110 THERAPEUTIC EXERCISES: CPT | Performed by: PHYSICAL THERAPIST

## 2023-06-13 NOTE — THERAPY TREATMENT NOTE
Outpatient Physical Therapy Ortho Treatment Note  [facility]     Patient Name: Sophia Neff  : 1945  MRN: [id number]  Today's Date: 2023      Visit Date: 2023    Patient seen for 19 PT sessions.  Patient reports 80-90% of improvement.  Next MD appt: 2023.  Recertification: 2023.     Therapy Diagnosis: S/P L RCR/DCE/ACR/Bicep tedotomoy (DOS2023)      Visit Dx:    ICD-10-CM ICD-9-CM   1. Rotator cuff syndrome of left shoulder  M75.102 726.10   2. Nontraumatic incomplete tear of left rotator cuff  M75.112 726.13   3. Chronic left shoulder pain  M25.512 719.41    G89.29 338.29   4. 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     Plantar fasciitis     Bilateral        Past Surgical History:   Procedure Laterality Date    APPENDECTOMY  1970    BREAST SURGERY Bilateral 1992    Reduction    GALLBLADDER SURGERY  2004    HYSTERECTOMY  1990    NECK SURGERY  1989    PELVIC FLOOR REPAIR  2009    PELVIC FLOOR REPAIR  2009    ROTATOR CUFF REPAIR W/ DISTAL CLAVICLE EXCISION Left 2023                        PT Assessment/Plan       Row Name 23 1400          PT Assessment    Assessment Comments Good effort throughout treatment today. Fatigued with abductin chicken foot and with arm circles. Improved postural awareness with scap stab activities.  -AJ        PT Plan     "PT Frequency 2x/week  -AJ     PT Plan Comments Add wall push ups next session.  -AJ               User Key  (r) = Recorded By, (t) = Taken By, (c) = Cosigned By      Initials Name Provider Type    Edna Salguero, PT DPT Physical Therapist                     Modalities       Row Name 06/13/23 1400             Ice    Ice Applied Yes  -AJ      Location L shoulder to go.  -AJ      Patient reports will apply ice at home to involved area Yes  -AJ                User Key  (r) = Recorded By, (t) = Taken By, (c) = Cosigned By      Initials Name Provider Type    Edna Salguero, PT DPT Physical Therapist                   OP Exercises       Row Name 06/13/23 1400             Subjective Comments    Subjective Comments Patientrpeorts no pain. She reports she isn't even sore today.  -AJ         Subjective Pain    Able to rate subjective pain? yes  -AJ      Pre-Treatment Pain Level 0  -AJ         Exercise 1    Exercise Name 1 Pro II UE Fwd/Retro  -AJ      Time 1 10 min  -AJ      Additional Comments L 5.5  -AJ         Exercise 2    Exercise Name 2 Pulley's 3 way  -AJ      Time 2 2 min ea direction  -AJ      Additional Comments 2# cuff weight  -AJ         Exercise 3    Exercise Name 3 AROM: flex/scap/abd  -AJ      Sets 3 1  -AJ      Reps 3 10 each  -AJ         Exercise 4    Exercise Name 4 Wall slides chicken foot: flex/abd  -AJ      Sets 4 1  -AJ      Reps 4 10 each  -AJ         Exercise 5    Exercise Name 5 SL AROM ER  -AJ      Sets 5 2  -AJ      Reps 5 10  -AJ      Time 5 5\" hold  -AJ         Exercise 6    Exercise Name 6 Arm circles @90° abduction: cw/ccw  -AJ      Sets 6 2  -AJ      Reps 6 10 each  -AJ      Additional Comments dinner plate size  -AJ         Exercise 7    Exercise Name 7 Arm circles @90° flexion: cw/ccw  -AJ      Sets 7 2  -AJ      Reps 7 10  -AJ      Additional Comments dinner plate size  -AJ         Exercise 8    Exercise Name 8 Tband Rows: Low, Mid  -AJ      Sets 8 2  -AJ      Reps 8 10 " "each  -AJ      Additional Comments GTB  -         Exercise 9    Exercise Name 9 Tband B shoulder ext  -      Sets 9 2  -      Reps 9 10  -AJ      Additional Comments GTB  -         Exercise 10    Exercise Name 10 No $  -      Sets 10 2  -      Reps 10 10  -AJ      Time 10 5\" hold  -         Exercise 11    Exercise Name 11 Supine ER S  -      Time 11 5 min  -                User Key  (r) = Recorded By, (t) = Taken By, (c) = Cosigned By      Initials Name Provider Type    Edna Salguero, PT DPT Physical Therapist                    All therapeutic interventions performed today were to address current functional limitations and/or deficits in addressing all physical therapy goals.                  PT OP Goals       Row Name 06/13/23 1400          PT Short Term Goals    STG 1 I with HEP and have additions/changes by next re-certification.  -     STG 1 Progress Met  -     STG 2 Patient to be more aware of posture and posture correction technique.  -     STG 2 Progress Partially Met  -     STG 3 Patient to be compliant with sling wear per MD protocol.  -     STG 3 Progress Met  -     STG 4 PROM L Shoulder flexion/abduction >=145°.  -     STG 4 Progress Met  -     STG 5 PROM L shoulder IR >= 55° at 90° of shoulder abduction.  -     STG 5 Progress Met  -     STG 6 L  at the #2 setting within 10# of the contralateral side with average in 3 attempts.  -     STG 6 Progress Not Met  -     STG 7 AAROM L Shoulder flexion/abduction >=125° in standing..  -     STG 7 Progress Met  -        Long Term Goals    LTG 1 Patient to have full PROM for the L shoulder in all planes.  -AJ     LTG 1 Progress Met  -     LTG 2 Patient to have full L shoulder AAROM with 3-way pulley's.  -     LTG 2 Progress Met  -     LTG 3 AROM L Shoulder flexion/abduction >=155°.  -     LTG 3 Progress Ongoing;Progressing  -     LTG 4 AROM L shoulder IR >= 55° at 90° of shoulder abduction.  -AJ "     LTG 4 Progress Ongoing;Progressing  -     LTG 5 AROM L shoulder ER >= 75° at 90° of shoulder abduction.  -     LTG 5 Progress Met  -     LTG 6 L UE >= 4+/5.  -     LTG 6 Progress Ongoing  -     LTG 7 Patient able to perform 3 minutes of OH activities with no increase in pain.  -     LTG 8 I with final HEP.  -        Time Calculation    PT Goal Re-Cert Due Date 06/29/23  -               User Key  (r) = Recorded By, (t) = Taken By, (c) = Cosigned By      Initials Name Provider Type    Edna Salguero, PT DPT Physical Therapist                                   Time Calculation:   Start Time: 1430  Stop Time: 1525  Time Calculation (min): 55 min  Total Timed Code Minutes- PT: 55 minute(s)  Therapy Charges for Today       Code Description Service Date Service Provider Modifiers Qty    56605423935  PT THER SUPP EA 15 MIN 6/13/2023 Edna Wells, PT DPT GP 1    97994726414 HC PT THER PROC EA 15 MIN 6/13/2023 Edna Wells, PT DPT GP 4                  This document has been electronically signed by Edna Wells PT [facility], [facility] on June 13, 2023 15:31 CDT

## 2023-06-15 ENCOUNTER — HOSPITAL ENCOUNTER (OUTPATIENT)
Dept: PHYSICAL THERAPY | Facility: HOSPITAL | Age: 78
Setting detail: THERAPIES SERIES
Discharge: HOME | End: 2023-06-15
Payer: MEDICARE

## 2023-06-15 DIAGNOSIS — M25.512 CHRONIC LEFT SHOULDER PAIN: ICD-10-CM

## 2023-06-15 DIAGNOSIS — G89.29 CHRONIC LEFT SHOULDER PAIN: ICD-10-CM

## 2023-06-15 DIAGNOSIS — M19.012 PRIMARY OSTEOARTHRITIS OF LEFT SHOULDER: ICD-10-CM

## 2023-06-15 DIAGNOSIS — M75.102 ROTATOR CUFF SYNDROME OF LEFT SHOULDER: Primary | ICD-10-CM

## 2023-06-15 DIAGNOSIS — M75.112 NONTRAUMATIC INCOMPLETE TEAR OF LEFT ROTATOR CUFF: ICD-10-CM

## 2023-06-15 PROCEDURE — 97110 THERAPEUTIC EXERCISES: CPT

## 2023-06-15 NOTE — THERAPY TREATMENT NOTE
Outpatient Physical Therapy Ortho Treatment Note  [facility]     Patient Name: Sophia Neff  : 1945  MRN: [id number]  Today's Date: 6/15/2023    Pt seen for 20 PT sessions  Reported Improvement:  90 %  MD Visit: PRN  Recheck Date: 2023    Therapy Diagnosis:  S/P L RCR/DCE/ACR/Bicep tedotomoy (DOS2023)           Visit Date: 06/15/2023    Visit Dx:    ICD-10-CM ICD-9-CM   1. Rotator cuff syndrome of left shoulder  M75.102 726.10   2. Nontraumatic incomplete tear of left rotator cuff  M75.112 726.13   3. Chronic left shoulder pain  M25.512 719.41    G89.29 338.29   4. 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     Plantar fasciitis     Bilateral        Past Surgical History:   Procedure Laterality Date    APPENDECTOMY  1970    BREAST SURGERY Bilateral 1992    Reduction    GALLBLADDER SURGERY  2004    HYSTERECTOMY  1990    NECK SURGERY  1989    PELVIC FLOOR REPAIR  2009    PELVIC FLOOR REPAIR  2009    ROTATOR CUFF REPAIR W/ DISTAL CLAVICLE EXCISION Left 2023        PT Ortho       Row Name 06/15/23 1400       Subjective Comments    Subjective Comments Neck is hurting me today.  Shoulder is fine.  -JW       Precautions and Contraindications    Precautions Large Tear Protocol  -       Subjective Pain    Able to rate subjective pain? yes  -    Pre-Treatment Pain Level 1  -               User Key  (r) = Recorded By, (t) = Taken By, (c) = Cosigned By      Initials Name Provider Type    Shital Cadet PTA Physical Therapist Assistant                                 PT Assessment/Plan       Row Name 06/15/23 1500          PT Assessment    Assessment Comments Pt reports being very tired today.  No reports of pain with increased reps of therex or new therex.  Pt had f/u with ortho yesterday and is pleased with progress.  Does not have to return unless needed.  Lacks end range of active motion however improving.  Ice to go post tx session .  -JW        PT Plan    PT Frequency 2x/week  -JW     PT Plan Comments Next visit add IR/ER walkouts with tband.  -               User Key  (r) = Recorded By, (t) = Taken By, (c) = Cosigned By      Initials Name Provider Type    Shital Cadet PTA Physical Therapist Assistant                       OP Exercises       Row Name 06/15/23 1400             Subjective Comments    Subjective Comments Neck is hurting me today.  Shoulder is fine.  -JW         Subjective Pain    Able to rate subjective pain? yes  -JW      Pre-Treatment Pain Level 1  -JW      Post-Treatment Pain Level 0  -JW         Exercise 1    Exercise Name 1 Pro II UE Fwd/Retro  -JW      Time 1 10 min  -JW      Additional Comments L 6.0  -JW         Exercise 2    Exercise Name 2 Pulley's 3 way  -JW      Time 2 2 min ea direction  -JW      Additional Comments 2# cuff weight  -JW         Exercise 3    Exercise Name 3 AROM; fwd flexion  -JW      Sets 3 2  -JW      Reps 3 10  -JW         Exercise 4    Exercise Name 4 AROM ; abduction  -JW      Sets 4 2  -JW      Reps 4 10  -JW         Exercise 5    Exercise Name 5 Tband Rows; low  -JW      Reps 5 20  -JW      Additional Comments GTB  -JW         Exercise 6    Exercise Name 6 Tband B shoulder extensions  -JW      Reps 6 20  -JW      Additional Comments GTB  -JW         Exercise 7    Exercise Name 7 Wall push ups  -JW      Sets 7 2  -JW       Reps 7 10  -                User Key  (r) = Recorded By, (t) = Taken By, (c) = Cosigned By      Initials Name Provider Type    Shital Cadet PTA Physical Therapist Assistant                                  PT OP Goals       Row Name 06/15/23 1400          PT Short Term Goals    STG 1 I with HEP and have additions/changes by next re-certification.  -     STG 1 Progress Met  -     STG 2 Patient to be more aware of posture and posture correction technique.  -     STG 2 Progress Partially Met  -     STG 3 Patient to be compliant with sling wear per MD protocol.  -     STG 3 Progress Met  -     STG 4 PROM L Shoulder flexion/abduction >=145°.  -     STG 4 Progress Met  -     STG 5 PROM L shoulder IR >= 55° at 90° of shoulder abduction.  -     STG 5 Progress Met  -     STG 6 L  at the #2 setting within 10# of the contralateral side with average in 3 attempts.  -     STG 6 Progress Not Met  -     STG 7 AAROM L Shoulder flexion/abduction >=125° in standing..  -     STG 7 Progress Met  -        Long Term Goals    LTG 1 Patient to have full PROM for the L shoulder in all planes.  -     LTG 1 Progress Met  -     LTG 2 Patient to have full L shoulder AAROM with 3-way pulley's.  -     LTG 2 Progress Met  -     LTG 3 AROM L Shoulder flexion/abduction >=155°.  -     LTG 3 Progress Ongoing;Progressing  -     LTG 4 AROM L shoulder IR >= 55° at 90° of shoulder abduction.  -     LTG 4 Progress Ongoing;Progressing  -     LTG 5 AROM L shoulder ER >= 75° at 90° of shoulder abduction.  -     LTG 5 Progress Met  -     LTG 6 L UE >= 4+/5.  -     LTG 6 Progress Ongoing  -     LTG 7 Patient able to perform 3 minutes of OH activities with no increase in pain.  -     LTG 8 I with final HEP.  -        Time Calculation    PT Goal Re-Cert Due Date 06/29/23  -               User Key  (r) = Recorded By, (t) = Taken By, (c) = Cosigned By      Initials Name Provider Type    HARVINDER Booker  MANUEL Isaacs Physical Therapist Assistant                                   Time Calculation:   Start Time: 1430  Stop Time: 1514  Time Calculation (min): 44 min  Therapy Charges for Today       Code Description Service Date Service Provider Modifiers Qty    44623600167  PT THER PROC EA 15 MIN 6/15/2023 Shital Booker PTA GP, CQ 3    [id number]  PT THER SUPP EA 15 MIN 6/15/2023 Shital Booker PTA GP, CQ 1                      Shital Booker PTA  6/15/2023

## 2023-07-12 ENCOUNTER — APPOINTMENT (OUTPATIENT)
Dept: PHYSICAL THERAPY | Facility: HOSPITAL | Age: 78
End: 2023-07-12
Payer: MEDICARE

## 2023-08-18 ENCOUNTER — TELEMEDICINE (OUTPATIENT)
Dept: ENDOCRINOLOGY | Facility: CLINIC | Age: 78
End: 2023-08-18
Payer: MEDICARE

## 2023-08-18 DIAGNOSIS — E78.2 MIXED DIABETIC HYPERLIPIDEMIA ASSOCIATED WITH TYPE 1 DIABETES MELLITUS: ICD-10-CM

## 2023-08-18 DIAGNOSIS — E11.22 TYPE 2 DIABETES MELLITUS WITH STAGE 3A CHRONIC KIDNEY DISEASE, WITHOUT LONG-TERM CURRENT USE OF INSULIN: Primary | ICD-10-CM

## 2023-08-18 DIAGNOSIS — I15.2 HYPERTENSION ASSOCIATED WITH DIABETES: ICD-10-CM

## 2023-08-18 DIAGNOSIS — E10.69 MIXED DIABETIC HYPERLIPIDEMIA ASSOCIATED WITH TYPE 1 DIABETES MELLITUS: ICD-10-CM

## 2023-08-18 DIAGNOSIS — N18.31 TYPE 2 DIABETES MELLITUS WITH STAGE 3A CHRONIC KIDNEY DISEASE, WITHOUT LONG-TERM CURRENT USE OF INSULIN: Primary | ICD-10-CM

## 2023-08-18 DIAGNOSIS — E11.59 HYPERTENSION ASSOCIATED WITH DIABETES: ICD-10-CM

## 2023-08-18 NOTE — PROGRESS NOTES
Sophia Neff is a 78 y.o. female who presents for  evaluation of   Diabetes                                                                          This was a Telehealth Encounter. Benefits and Disadvantages of a Telehealth Visit were discussed and accepted by patient.  Mode of Visit: Video  Location of patient: Home  You have chosen to receive care through a telehealth visit.  Does the patient consent to use a video/audio connection for your medical care today? Yes  The visit included audio and video interaction. No technical issues occurred during this visit          HPI     77 yo female with T2DM for more than 10 years complicated by stage 3 CKD  Diabetes well controlled    PE    There were no vitals taken for this visit.  AOx3  No visible goiter  Normal Respiratory Effort   No Edema    Labs    Lab Results   Component Value Date    WBC 4.76 04/05/2023    HGB 9.4 (L) 04/05/2023    HCT 30.8 (L) 04/05/2023    MCV 86.8 04/05/2023     04/05/2023     Lab Results   Component Value Date    GLUCOSE 107 (H) 04/05/2023    BUN 20 04/05/2023    CREATININE 1.00 04/05/2023    EGFRIFNONA 52 (L) 12/10/2021    BCR 20.0 04/05/2023     04/05/2023    K 4.4 04/05/2023    CO2 28.0 04/05/2023    CALCIUM 9.9 04/05/2023    ALBUMIN 3.9 04/05/2023    AST 17 04/05/2023    ALT 13 04/05/2023                   Assessment & Plan       ICD-10-CM ICD-9-CM   1. Type 2 diabetes mellitus with stage 3a chronic kidney disease, without long-term current use of insulin  E11.22 250.40    N18.31 585.3   2. Mixed diabetic hyperlipidemia associated with type 1 diabetes mellitus  E10.69 250.81    E78.2 272.2   3. Hypertension associated with diabetes  E11.59 250.80    I15.2 401.9             T2DM    Glycemic Management:   Lab Results   Component Value Date    HGBA1C 6.10 (H) 04/05/2023    HGBA1C 5.60 12/06/2022    HGBA1C 5.70 (H) 04/26/2022         trulicity 4.5 and jardiance 10     Lipid Management    Lab Results   Component Value  Date    CHOL 141 04/05/2023    TRIG 64 04/05/2023    HDL 55 04/05/2023    LDL 73 04/05/2023         Pravastatin 40 mg qhs - now taking 20 mg         Blood Pressure Management:    There were no vitals filed for this visit.  Lab Results   Component Value Date    GLUCOSE 107 (H) 04/05/2023    CALCIUM 9.9 04/05/2023     04/05/2023    K 4.4 04/05/2023    CO2 28.0 04/05/2023     04/05/2023    BUN 20 04/05/2023    CREATININE 1.00 04/05/2023    EGFRIFNONA 52 (L) 12/10/2021    BCR 20.0 04/05/2023    ANIONGAP 8.0 04/05/2023         Nl bp on hydralazine and losartan 100 mg daily     On chlorthalidone, stopped before by me when I added jardiance        Microvascular Complication Monitoring:      Eye Exam Evaluation  Within 1 year , 2022 , normal   -----------    Last Microalbumin-Proteinuria Assessment    Lab Results   Component Value Date    MALBCRERATIO 320.0 04/06/2023    MALBCRERATIO 381.4 12/06/2022    MALBCRERATIO 174.5 04/26/2022       No results found for: UTPCR     Stage 3 a / A 3 -- now A2    On kerendia 20 mg daily - not paid     On jardiance and losartan     -----------      Neuropathy, none            Weight Related:   Wt Readings from Last 3 Encounters:   11/08/21 74.8 kg (165 lb)   07/15/21 78.5 kg (173 lb)   11/16/20 77.7 kg (171 lb 3.2 oz)     There is no height or weight on file to calculate BMI.        Diet interventions: moderate (500 kCal/d) deficit diet.      Bone Health    Lab Results   Component Value Date    CALCIUM 9.9 04/05/2023    EYWG89VF 56.5 12/06/2022       Thyroid Health    Lab Results   Component Value Date    TSH 0.992 04/05/2023    TSH 1.710 12/06/2022    TSH 1.880 04/26/2022            Other Diabetes Related Aspects       Lab Results   Component Value Date    LQIIOEDO18 683 04/05/2023            This document has been electronically signed by Héctor Velazquez MD on August 18, 2023 13:20 CDT